# Patient Record
Sex: FEMALE | Race: AMERICAN INDIAN OR ALASKA NATIVE | HISPANIC OR LATINO
[De-identification: names, ages, dates, MRNs, and addresses within clinical notes are randomized per-mention and may not be internally consistent; named-entity substitution may affect disease eponyms.]

---

## 2017-01-26 ENCOUNTER — TRANSCRIPTION ENCOUNTER (OUTPATIENT)
Age: 37
End: 2017-01-26

## 2021-02-10 ENCOUNTER — EMERGENCY (EMERGENCY)
Facility: HOSPITAL | Age: 41
LOS: 1 days | Discharge: LEFT BEFORE DISCHARGE | End: 2021-02-10
Attending: EMERGENCY MEDICINE
Payer: MEDICAID

## 2021-02-10 VITALS
TEMPERATURE: 98 F | HEIGHT: 61 IN | DIASTOLIC BLOOD PRESSURE: 65 MMHG | RESPIRATION RATE: 20 BRPM | WEIGHT: 145.06 LBS | HEART RATE: 76 BPM | SYSTOLIC BLOOD PRESSURE: 111 MMHG

## 2021-02-10 PROCEDURE — 99285 EMERGENCY DEPT VISIT HI MDM: CPT

## 2021-02-10 PROCEDURE — 93010 ELECTROCARDIOGRAM REPORT: CPT

## 2021-02-10 RX ORDER — FAMOTIDINE 10 MG/ML
20 INJECTION INTRAVENOUS ONCE
Refills: 0 | Status: COMPLETED | OUTPATIENT
Start: 2021-02-10 | End: 2021-02-10

## 2021-02-10 RX ADMIN — FAMOTIDINE 20 MILLIGRAM(S): 10 INJECTION INTRAVENOUS at 23:14

## 2021-02-10 RX ADMIN — Medication 30 MILLILITER(S): at 23:13

## 2021-02-10 NOTE — ED PROVIDER NOTE - ATTENDING CONTRIBUTION TO CARE
Private Physician None  41y female PMH fx hip/pelvis [employer]/service connected. Ex-smoker dc 2m ago. No dm, Hypertension,hld, cancer,cva,cad,covid,meds. Pt comes to ed c/o sudden onset of sharp cp. Approx 2h ago, lasted 2min. took asa, No rad,sob,nvdc,palp,diaphoresis.abd pain. FH mother 70 HTN, CAD from age 30's Father 70 CKD/HD/Renal transplant/cirrhosis,dm, CAD from age 40. PE WDWN female NCAT neck supple chest clear anterior & posterior abd soft +bs no mass guarding cv no rubs, gallops or murmurs neuro no focal defects.  Truman Hallman MD, Facep

## 2021-02-10 NOTE — ED PROVIDER NOTE - PHYSICAL EXAMINATION
GENERAL: no acute distress, non-toxic appearing  HEENT: normal conjunctiva, oral mucosa moist  CARDIAC: regular rate and regular rhythm, normal S1 and S2, no appreciable murmurs  PULM: clear to ascultation bilaterally, no appreciable crackles, rales, rhonchi, or wheezing, sats 100% on RA, no increased work of breathing  GI: abdomen nondistended, soft, nontender  : no CVA tenderness, no suprapubic tenderness  NEURO: alert and oriented x 3, normal speech, moving all extremities without lateralization  MSK: no visible deformities, no peripheral edema, calf tenderness/redness/swelling  SKIN: no visible rashes  PSYCH: appropriate mood and affect Opt out

## 2021-02-10 NOTE — ED PROVIDER NOTE - PATIENT PORTAL LINK FT
You can access the FollowMyHealth Patient Portal offered by [facility] by registering at the following website: [email or web address]. By joining [employer]’s FollowMyHealth portal, you will also be able to view your health information using other applications (apps) compatible with our system.

## 2021-02-10 NOTE — ED PROVIDER NOTE - CLINICAL SUMMARY MEDICAL DECISION MAKING FREE TEXT BOX
Likely GERD, low suspicion for ACS, low suspicion for PE pt percs out. Will do labs, cxr, ekg, trop, pepcid/maalox. Reassess.

## 2021-02-10 NOTE — ED PROVIDER NOTE - NSFOLLOWUPCLINICS_GEN_ALL_ED_FT
[facility]  Cardiology  [address] [place] [address]  Phone: [phone]  Fax:   Follow Up Time: 1-3 Days

## 2021-02-10 NOTE — ED PROVIDER NOTE - NSFOLLOWUPINSTRUCTIONS_ED_ALL_ED_FT
- Please follow up with your Primary Care Doctor within 48 hours. Bring your results from today.    - Please follow up with a Cardiologist within 48 hours. A referral was made to Cardiology, you should get a call within 24 hours with appointment information.     - Be sure to return to the ED if you develop new or worsening symptoms. Specific signs and symptoms to be vigilant of: chest pain, lightheadedness, palpitations, shortness of breath, or any other distressing symptoms.

## 2021-02-10 NOTE — ED PROVIDER NOTE - OBJECTIVE STATEMENT
41F no PMH, presents after she felt sharp substernal chest pain for 3 minute duration 2 hours prior to arrival in the ED. Took 1 baby aspirin and then 2 tums and says the pain went away just has some discomfort and thinks it's likely gas but was concerned so she came in. Denies any radiation of the pain, denies any association with diaphoresis, nausea, sob, palpitations, lightheadedness. Denies any fevers, chills, uri sxs, cough, abd pain, v/d, urinary sxs, leg swelling. No hx blood clots, no recent surgeries/trauma, not on OCP's. Former smoker of 10 years, quit December 2020.

## 2021-02-10 NOTE — ED PROVIDER NOTE - PROGRESS NOTE DETAILS
Frantz, PGY2: pt with family hx of early CAD mom in 30s, we recommended that if everything is negative she stay in the CDU, states she's unable to due to her 1 year old daughter at home she needs to be around for. Will set pt up for Cards but she'd leave AMA. She understands.

## 2021-02-10 NOTE — ED PROVIDER NOTE - REFUSAL OF SERVICE, MDM
Patient understands that given her family history of early CAD we are concerned about her chest pain and would like her to stay for further testing. She understands that she is at risk of having a major cardiac event. She understands that she has to follow up with Cardiologist.

## 2021-02-11 VITALS
RESPIRATION RATE: 16 BRPM | DIASTOLIC BLOOD PRESSURE: 90 MMHG | TEMPERATURE: 98 F | HEART RATE: 56 BPM | SYSTOLIC BLOOD PRESSURE: 95 MMHG | OXYGEN SATURATION: 100 %

## 2021-02-11 LAB
ALBUMIN SERPL ELPH-MCNC: 4 G/DL — SIGNIFICANT CHANGE UP (ref 3.3–5)
ALP SERPL-CCNC: 64 U/L — SIGNIFICANT CHANGE UP (ref 40–120)
ALT FLD-CCNC: 12 U/L — SIGNIFICANT CHANGE UP (ref 10–45)
ANION GAP SERPL CALC-SCNC: 12 MMOL/L — SIGNIFICANT CHANGE UP (ref 5–17)
AST SERPL-CCNC: 18 U/L — SIGNIFICANT CHANGE UP (ref 10–40)
BASOPHILS # BLD AUTO: 0.04 K/UL — SIGNIFICANT CHANGE UP (ref 0–0.2)
BASOPHILS NFR BLD AUTO: 0.5 % — SIGNIFICANT CHANGE UP (ref 0–2)
BILIRUB SERPL-MCNC: 0.1 MG/DL — LOW (ref 0.2–1.2)
BUN SERPL-MCNC: 16 MG/DL — SIGNIFICANT CHANGE UP (ref 7–23)
CALCIUM SERPL-MCNC: 10 MG/DL — SIGNIFICANT CHANGE UP (ref 8.4–10.5)
CHLORIDE SERPL-SCNC: 104 MMOL/L — SIGNIFICANT CHANGE UP (ref 96–108)
CO2 SERPL-SCNC: 21 MMOL/L — LOW (ref 22–31)
CREAT SERPL-MCNC: 0.72 MG/DL — SIGNIFICANT CHANGE UP (ref 0.5–1.3)
EOSINOPHIL # BLD AUTO: 0.13 K/UL — SIGNIFICANT CHANGE UP (ref 0–0.5)
EOSINOPHIL NFR BLD AUTO: 1.5 % — SIGNIFICANT CHANGE UP (ref 0–6)
GLUCOSE SERPL-MCNC: 92 MG/DL — SIGNIFICANT CHANGE UP (ref 70–99)
HCG SERPL-ACNC: <2 MIU/ML — SIGNIFICANT CHANGE UP
HCT VFR BLD CALC: 37.1 % — SIGNIFICANT CHANGE UP (ref 34.5–45)
HGB BLD-MCNC: 11.9 G/DL — SIGNIFICANT CHANGE UP (ref 11.5–15.5)
IMM GRANULOCYTES NFR BLD AUTO: 0.3 % — SIGNIFICANT CHANGE UP (ref 0–1.5)
LYMPHOCYTES # BLD AUTO: 3.29 K/UL — SIGNIFICANT CHANGE UP (ref 1–3.3)
LYMPHOCYTES # BLD AUTO: 38 % — SIGNIFICANT CHANGE UP (ref 13–44)
MCHC RBC-ENTMCNC: 30.2 PG — SIGNIFICANT CHANGE UP (ref 27–34)
MCHC RBC-ENTMCNC: 32.1 GM/DL — SIGNIFICANT CHANGE UP (ref 32–36)
MCV RBC AUTO: 94.2 FL — SIGNIFICANT CHANGE UP (ref 80–100)
MONOCYTES # BLD AUTO: 0.66 K/UL — SIGNIFICANT CHANGE UP (ref 0–0.9)
MONOCYTES NFR BLD AUTO: 7.6 % — SIGNIFICANT CHANGE UP (ref 2–14)
NEUTROPHILS # BLD AUTO: 4.5 K/UL — SIGNIFICANT CHANGE UP (ref 1.8–7.4)
NEUTROPHILS NFR BLD AUTO: 52.1 % — SIGNIFICANT CHANGE UP (ref 43–77)
NRBC # BLD: 0 /100 WBCS — SIGNIFICANT CHANGE UP (ref 0–0)
PLATELET # BLD AUTO: 277 K/UL — SIGNIFICANT CHANGE UP (ref 150–400)
POTASSIUM SERPL-MCNC: 3.9 MMOL/L — SIGNIFICANT CHANGE UP (ref 3.5–5.3)
POTASSIUM SERPL-SCNC: 3.9 MMOL/L — SIGNIFICANT CHANGE UP (ref 3.5–5.3)
PROT SERPL-MCNC: 6.7 G/DL — SIGNIFICANT CHANGE UP (ref 6–8.3)
RBC # BLD: 3.94 M/UL — SIGNIFICANT CHANGE UP (ref 3.8–5.2)
RBC # FLD: 11.9 % — SIGNIFICANT CHANGE UP (ref 10.3–14.5)
SODIUM SERPL-SCNC: 137 MMOL/L — SIGNIFICANT CHANGE UP (ref 135–145)
TROPONIN T, HIGH SENSITIVITY RESULT: <6 NG/L — SIGNIFICANT CHANGE UP (ref 0–51)
TROPONIN T, HIGH SENSITIVITY RESULT: <6 NG/L — SIGNIFICANT CHANGE UP (ref 0–51)
WBC # BLD: 8.65 K/UL — SIGNIFICANT CHANGE UP (ref 3.8–10.5)
WBC # FLD AUTO: 8.65 K/UL — SIGNIFICANT CHANGE UP (ref 3.8–10.5)

## 2021-02-11 PROCEDURE — 99284 EMERGENCY DEPT VISIT MOD MDM: CPT | Mod: 25

## 2021-02-11 PROCEDURE — 71045 X-RAY EXAM CHEST 1 VIEW: CPT

## 2021-02-11 PROCEDURE — 71045 X-RAY EXAM CHEST 1 VIEW: CPT | Mod: 26

## 2021-02-11 PROCEDURE — 80053 COMPREHEN METABOLIC PANEL: CPT

## 2021-02-11 PROCEDURE — 84702 CHORIONIC GONADOTROPIN TEST: CPT

## 2021-02-11 PROCEDURE — 93005 ELECTROCARDIOGRAM TRACING: CPT

## 2021-02-11 PROCEDURE — 85025 COMPLETE CBC W/AUTO DIFF WBC: CPT

## 2021-02-11 PROCEDURE — 96374 THER/PROPH/DIAG INJ IV PUSH: CPT

## 2021-02-11 PROCEDURE — 84484 ASSAY OF TROPONIN QUANT: CPT

## 2021-02-11 NOTE — ED ADULT NURSE NOTE - OBJECTIVE STATEMENT
Pt is a 42 y/o female c/o episode of chest pain around 2100 2/10/21. States she had a 5 minute episode of midsternal squeezing chest pain, states she has never felt anything like this before. After episode states she had slight chest discomfort. Denies SOB, N/V/D, numbness, tingling, cough, fever, chills, dizziness, weakness, headache. A&Ox3. Breathing unlabored and spontaneous. Abdomen soft, nontender, nondistended. Full ROM and strength of extremities. Safety and comfort measures provided, call bell provided to pt and bed in lowest position.  Pt AMA r/t needing to care for her 2 y/o child, states she will follow up with PCP and cardiology. Denies any complaints at time of d/c, KARLOS paper signed and in chart.

## 2021-02-11 NOTE — ED ADULT NURSE REASSESSMENT NOTE - NS ED NURSE REASSESS COMMENT FT1
Pt A&Ox4, returned from xray. Pt on tele cardiac monitor. Tele tech over to team with print out of pt have episode of bradycardia. MD made aware. pt denies CP, SOB, dizziness, LOC. repeat VSS.

## 2021-08-08 ENCOUNTER — EMERGENCY (EMERGENCY)
Facility: HOSPITAL | Age: 41
LOS: 1 days | Discharge: HOME | End: 2021-08-08
Attending: EMERGENCY MEDICINE
Payer: MEDICAID

## 2021-08-08 VITALS
HEIGHT: 61 IN | WEIGHT: 147.05 LBS | RESPIRATION RATE: 16 BRPM | OXYGEN SATURATION: 96 % | TEMPERATURE: 98 F | HEART RATE: 86 BPM | SYSTOLIC BLOOD PRESSURE: 127 MMHG | DIASTOLIC BLOOD PRESSURE: 87 MMHG

## 2021-08-08 PROCEDURE — 99282 EMERGENCY DEPT VISIT SF MDM: CPT

## 2021-08-08 PROCEDURE — 99283 EMERGENCY DEPT VISIT LOW MDM: CPT

## 2021-08-08 NOTE — ED PROVIDER NOTE - NSFOLLOWUPINSTRUCTIONS_ED_ALL_ED_FT
- stay hydrated.   - take tylenol 975mg and ibuprofen 600mg every 6 hours as needed for pain-take with meals.  -use warm packs/warm soaks to eye lid 3-4 times a day  - follow up with your pcp in 1-2 days.  - return if symptoms worsen, fever, changes in vision, eye pain, blurred vision and all other concerns. - stay hydrated.   - take tylenol 975mg and ibuprofen 600mg every 6 hours as needed for pain-take with meals.  -use warm packs/warm soaks to eye lid 3-4 times a day  - follow up with your pcp in 1-2 days.  -follow up with ophthalmology this week, call number attached to make an appointment.   - return if symptoms worsen, fever, changes in vision, eye pain, blurred vision, redness of the eyelid/skin, and all other concerns.

## 2021-08-08 NOTE — ED PROVIDER NOTE - CLINICAL SUMMARY MEDICAL DECISION MAKING FREE TEXT BOX
41F no PMH here for evaluation of L upper lid stye, which has resolved w warm compresses and cleansing of area, now with chalazion to L upper lid, painless, no surrounding erythema, no drainage, no vision changes. Advised to continue current mgmt and FU with optho.

## 2021-08-08 NOTE — ED PROVIDER NOTE - OBJECTIVE STATEMENT
42 yo female with no pmh here for concern of recurrent styes to left eye. PT states last week she had a particularly large one which improved today however did feel a residual "lump" on her eyelid and wished to have it evaluated. Denies using hot packs or taking medications for the symptoms, does not wear makeup and washed her face daily. No fevers, chills, changes in vision. eye drainage, wears glasses occasionally, no contact lens use, no blurred vision or eye pain.

## 2021-08-08 NOTE — ED PROVIDER NOTE - PATIENT PORTAL LINK FT
You can access the FollowMyHealth Patient Portal offered by [facility] by registering at the following website: [email or web address]. By joining [employer]’s FollowMyHealth portal, you will also be able to view your health information using other applications (apps) compatible with our system.

## 2021-08-08 NOTE — ED PROVIDER NOTE - CARE PLAN
Principal Discharge DX:	Hordeolum externum of left upper eyelid   Principal Discharge DX:	Hordeolum externum of left upper eyelid  Secondary Diagnosis:	Chalazion left upper eyelid

## 2021-08-08 NOTE — ED PROVIDER NOTE - NSFOLLOWUPCLINICS_GEN_ALL_ED_FT
[facility] - Ophthalmology  Ophthalmology  [address], [address]  [place] [address]  Phone: [phone]  Fax:   Follow Up Time: 1-3 Days    [facility]  Ophthalmology  [address]  [place] [address]  Phone: [phone]  Fax:   Follow Up Time: 1-3 Days

## 2021-08-08 NOTE — ED PROVIDER NOTE - PHYSICAL EXAMINATION
A&Ox3, NAD. NCAT. PERRL, EOMI. No conjunctival injection, no tearing or eye discharge. + small nodule to left upper eyelid, nontender, no discharge or erythema. Neck supple, no LAD. Gait steady.

## 2021-08-08 NOTE — ED PROVIDER NOTE - NS ED ROS FT
Constitutional: No fever or chills  Eyes: see hpi  Skin: No rash  Neuro: No headache. No numbness or tingling. No weakness.

## 2021-08-09 NOTE — ED ADULT NURSE NOTE - OBJECTIVE STATEMENT
41 y female presents from home with "lump," on left eye. Reports to chronic "stye," resolved last week- reporting to residual lump. Denies pain, vision change, HA. A&xo3. skin warm and dry. breathing comfortably in bed- no distress. seen and discharged by MD chang.

## 2023-06-05 PROBLEM — Z00.00 ENCOUNTER FOR PREVENTIVE HEALTH EXAMINATION: Status: ACTIVE | Noted: 2023-06-05

## 2023-06-13 ENCOUNTER — NON-APPOINTMENT (OUTPATIENT)
Age: 43
End: 2023-06-13

## 2023-06-13 ENCOUNTER — APPOINTMENT (OUTPATIENT)
Dept: NEUROSURGERY | Facility: CLINIC | Age: 43
End: 2023-06-13
Payer: MEDICAID

## 2023-06-13 VITALS
SYSTOLIC BLOOD PRESSURE: 102 MMHG | BODY MASS INDEX: 29.27 KG/M2 | WEIGHT: 155 LBS | DIASTOLIC BLOOD PRESSURE: 68 MMHG | HEIGHT: 61 IN | HEART RATE: 80 BPM | RESPIRATION RATE: 18 BRPM | OXYGEN SATURATION: 98 %

## 2023-06-13 DIAGNOSIS — G93.0 CEREBRAL CYSTS: ICD-10-CM

## 2023-06-13 PROCEDURE — 99204 OFFICE O/P NEW MOD 45 MIN: CPT

## 2023-06-13 RX ORDER — LIDOCAINE 5% 700 MG/1
PATCH TOPICAL
Refills: 0 | Status: ACTIVE | COMMUNITY

## 2023-06-13 RX ORDER — ACETAMINOPHEN 325 MG/1
TABLET, FILM COATED ORAL
Refills: 0 | Status: ACTIVE | COMMUNITY

## 2023-06-13 NOTE — HISTORY OF PRESENT ILLNESS
[de-identified] : 44 y/o right- handed female, former smoker, with no significant PMHx who presents today for evaluation of brain lesion on MRI dx during workup for progressive right sided numbness. \par \par Pt endorses she started experiencing right fingertip numbness that would come and go a few times a month. \par She sought workup at [facility] and had MRI brain done 1/20/23 which demonstrated small area of signal abnormality in the left posterior frontal subcortical and seep white matter and 3.7 x 1.8 cm arachnoid cyst.\par Since then, numbness episodes have progressed to also include her whole right arm and down her right leg. Described as waves of numbness. Now occur a few times daily. \par She can feel episodes coming on with left ear aura sensation. Notes some triggers of hot and cold sensations. \par \par She repeated MRI brain 5/30/23 which demonstrated some progression for which she was referred for evaluation. \par \par Denies weakness of arms/ legs, difficulty with speech. \par \par She had CT C/A/P which was negative. \par

## 2023-06-13 NOTE — REVIEW OF SYSTEMS
[As Noted in HPI] : as noted in HPI [Fever] : no fever [Chills] : no chills [Confused or Disoriented] : no confusion [Facial Weakness] : no facial weakness [Arm Weakness] : no arm weakness [Hand Weakness] : no hand weakness [Leg Weakness] : no leg weakness [Dizziness] : no dizziness [Eyesight Problems] : no eyesight problems [Chest Pain] : no chest pain [Palpitations] : no palpitations [Shortness Of Breath] : no shortness of breath

## 2023-06-13 NOTE — ASSESSMENT
[FreeTextEntry1] : 43F with no significant pmhx who presents with 6 months of progressive right sided numbness found to have left frontal motor strip cystic lesion with surrounding enhancement and FLAIR signal and second non-enhancing lesion in the posterior limb of the left internal capsule who presents for evaluation. Numbness comes and goes with some triggers such as cold water and temperature changes. Also gets an aura with feeling in right ear prior. We discussed that the differential diagnosis remains broad and I have recommended an MRI brain with spectroscopy and connectomics to help better understand the lesion. We will also discuss at our upcoming multidisciplinary conference for concensus on best way to proceed. We discussed the potential need for a biopsy. I have also referred her to see Dr. Doty to rule out seizures as a potential etiology. \par \par Dr. D' Amico independently reviewed all available images with patient. \par \par PLAN: \par - Present pt at [facility] brain and spine tumor board \par - Repeat MRI brain w/wo with quicktome and spectroscopy \par - Referral to Dr. Doty neurologist/ epilepsy specialist for workup\par - RTC after MRI to review \par \par Patient verbalizes understanding of today’s discussion and next steps in treatment plan. \par \par  \par A total of 45 minutes was spent reviewing the labs, imaging and physical examination of the patient. We discussed the diagnosis, and the plan. The patient's questions were answered. The patient demonstrated an excellent understanding of the plan.

## 2023-06-13 NOTE — PHYSICAL EXAM
[Oriented To Time, Place, And Person] : oriented to person, place, and time [Impaired Insight] : insight and judgment were intact [Affect] : the affect was normal [Memory Recent] : recent memory was not impaired [Motor Tone] : muscle tone was normal in all four extremities [Motor Strength] : muscle strength was normal in all four extremities [Motor Handedness Right-Handed] : the patient is right hand dominant [Sclera] : the sclera and conjunctiva were normal [PERRL With Normal Accommodation] : pupils were equal in size, round, reactive to light, with normal accommodation [Extraocular Movements] : extraocular movements were intact [Hearing Threshold Finger Rub Not [place]] : hearing was normal [Neck Appearance] : the appearance of the neck was normal [] : no respiratory distress [Respiration, Rhythm And Depth] : normal respiratory rhythm and effort [Abnormal Walk] : normal gait [Skin Color & Pigmentation] : normal skin color and pigmentation [FreeTextEntry5] : CN II- XII grossly intact

## 2023-06-14 ENCOUNTER — LABORATORY RESULT (OUTPATIENT)
Age: 43
End: 2023-06-14

## 2023-06-14 ENCOUNTER — APPOINTMENT (OUTPATIENT)
Dept: NEUROLOGY | Facility: CLINIC | Age: 43
End: 2023-06-14
Payer: MEDICAID

## 2023-06-14 VITALS
HEART RATE: 87 BPM | WEIGHT: 156 LBS | BODY MASS INDEX: 29.45 KG/M2 | HEIGHT: 61 IN | DIASTOLIC BLOOD PRESSURE: 74 MMHG | TEMPERATURE: 98 F | SYSTOLIC BLOOD PRESSURE: 109 MMHG | OXYGEN SATURATION: 97 %

## 2023-06-14 DIAGNOSIS — Z78.9 OTHER SPECIFIED HEALTH STATUS: ICD-10-CM

## 2023-06-14 DIAGNOSIS — Z82.3 FAMILY HISTORY OF STROKE: ICD-10-CM

## 2023-06-14 DIAGNOSIS — Z80.0 FAMILY HISTORY OF MALIGNANT NEOPLASM OF DIGESTIVE ORGANS: ICD-10-CM

## 2023-06-14 LAB
ANION GAP SERPL CALC-SCNC: 10 MMOL/L
BUN SERPL-MCNC: 9 MG/DL
C3 SERPL-MCNC: 136 MG/DL
C4 SERPL-MCNC: 26 MG/DL
CALCIUM SERPL-MCNC: 9.7 MG/DL
CHLORIDE SERPL-SCNC: 103 MMOL/L
CO2 SERPL-SCNC: 25 MMOL/L
CREAT SERPL-MCNC: 0.7 MG/DL
EGFR: 109 ML/MIN/1.73M2
GLUCOSE SERPL-MCNC: 92 MG/DL
POTASSIUM SERPL-SCNC: 4.2 MMOL/L
SODIUM SERPL-SCNC: 138 MMOL/L

## 2023-06-14 PROCEDURE — 99204 OFFICE O/P NEW MOD 45 MIN: CPT

## 2023-06-14 NOTE — ASSESSMENT
[FreeTextEntry1] : 43-year-old woman with episodic right-sided numbness and multifocal FLAIR lesions (left posterior frontal with enhancement, left internal capsule without enhancement).  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  I do not think EEG would be likely to show correlate so will instead start Keppra 250 mg BID to prevent episodes and confirm the diagnosis.  Differential for the etiology of these lesions is broad and includes neoplastic, infectious, inflammatory, and vascular (ie stroke) disorders.  Will check [place], dsDNA, [place], ANCA, complement, MOG, AQP4, antiphospholipid antibodies, HIV, RPR, toxo.  Will likely need LP and/or biopsy for definitive diagnosis.

## 2023-06-14 NOTE — PHYSICAL EXAM
[FreeTextEntry1] : Physical Exam\par Constitutional: no apparent distress\par Psychiatric: normal affect, euthymic, alert and oriented x 3\par \par Neurologic Exam:\par Mental Status: awake and alert, speech fluent and prosodic with no paraphasic errors\par Cranial Nerves: I: deferred; II: pupils equal round and reactive, visual fields full to confrontation, III, IV, VI: extraocular movements full with no nystagmus; V: facial sensation intact and symmetric; VII: facial power symmetric; VIII: hearing intact to finger rub; IX/X: palate elevates symmetrically, no dysarthria; XI: shoulder shrug symmetric; XII: tongue protrudes midline\par Motor: normal bulk and tone, no orbiting or pronator drift, power 5/5 to confrontation throughout including shoulder abduction, elbow flexion and extension, wrist flexion and extension, hip flexion, knee flexion and extension, no abnormal movements\par Sensation: decreased to light touch in right face, arm, and leg\par Coordination: finger-nose-finger intact bilaterally\par Reflexes: 2+ biceps, triceps, brachioradialis, patella\par Gait: narrow base, normal stance and stride, normal arm swing, normal tandem, negative Romberg\par

## 2023-06-14 NOTE — HISTORY OF PRESENT ILLNESS
[FreeTextEntry1] : 43-year-old woman with brain lesions presenting for evaluation of possible focal seizures.\par \par She reports that in August 2022 she was playing with her 3-year-old daughter when she suddenly felt numbness and tingling in her right arm and leg which lasted for 15-20 seconds.  She called EMS, was fine when they arrived and did not end up going to the hospital.  Over the next 2 months she continued to have these episodes about once a week and they started to involve the face and ear and along with the arm and leg.  Sometimes feels heavy and weak, but no stiffness or involuntary movements.  No loss of consciousness.  Right hand feels weaker and more clumsy overall.  \par \par MRI brain at [facility] in January and then in May 2023 showed left posterior frontal cortical cystic lesion with surrounding T2/FLAIR hyperintensity as well as separate circular T2/FLAIR lesion in left internal capsule.  \par \par Cousin has MS.  No other personal or family history of autoimmune disorders.  Up to date with mammogram and pap smear.

## 2023-06-15 LAB
CARDIOLIPIN AB SER IA-ACNC: NEGATIVE
ENA SS-A AB SER IA-ACNC: <0.2 AL
ENA SS-B AB SER IA-ACNC: <0.2 AL

## 2023-06-16 LAB
B2 GLYCOPROT1 IGA SERPL IA-ACNC: <5 SAU
B2 GLYCOPROT1 IGG SER-ACNC: <5 SGU
B2 GLYCOPROT1 IGM SER-ACNC: <5 SMU
CARDIOLIPIN IGM SER-MCNC: 7 GPL
CARDIOLIPIN IGM SER-MCNC: <5 MPL
DSDNA AB SER-ACNC: <12 IU/ML

## 2023-06-26 ENCOUNTER — NON-APPOINTMENT (OUTPATIENT)
Age: 43
End: 2023-06-26

## 2023-06-28 ENCOUNTER — TRANSCRIPTION ENCOUNTER (OUTPATIENT)
Age: 43
End: 2023-06-28

## 2023-06-29 ENCOUNTER — OUTPATIENT (OUTPATIENT)
Dept: OUTPATIENT SERVICES | Facility: HOSPITAL | Age: 43
LOS: 1 days | End: 2023-06-29
Payer: COMMERCIAL

## 2023-06-29 ENCOUNTER — APPOINTMENT (OUTPATIENT)
Dept: MRI IMAGING | Facility: HOSPITAL | Age: 43
End: 2023-06-29

## 2023-06-29 DIAGNOSIS — G93.9 DISORDER OF BRAIN, UNSPECIFIED: ICD-10-CM

## 2023-06-29 PROCEDURE — A9585: CPT

## 2023-06-29 PROCEDURE — 70553 MRI BRAIN STEM W/O & W/DYE: CPT

## 2023-06-29 PROCEDURE — 70553 MRI BRAIN STEM W/O & W/DYE: CPT | Mod: 26

## 2023-07-03 NOTE — REASON FOR VISIT
[Home] : at home, [unfilled] , at the time of the visit. [Medical Office: ([facility])___] : at the medical office located in  [Patient] : the patient [Follow-Up: _____] : a [unfilled] follow-up visit

## 2023-07-05 ENCOUNTER — APPOINTMENT (OUTPATIENT)
Dept: NEUROSURGERY | Facility: CLINIC | Age: 43
End: 2023-07-05
Payer: MEDICAID

## 2023-07-05 PROCEDURE — 99212 OFFICE O/P EST SF 10 MIN: CPT | Mod: 95

## 2023-07-05 NOTE — ASSESSMENT
[FreeTextEntry1] : 43F with no significant pmhx who presents with 6 months of progressive right sided numbness found to have left frontal motor strip cystic lesion with surrounding enhancement and FLAIR signal and second non-enhancing lesion in the posterior limb of the left internal capsule who presents for evaluation. Numbness comes and goes with some triggers such as cold water and temperature changes. Also gets an aura with feeling in right ear prior. We discussed that the differential diagnosis remains broad. MRI brain without significant growth. Spect pending. Awaiting LP per neurology. We discussed the potential need for a biopsy. I have also referred her to see Dr. Doty to rule out seizures as a potential etiology. \par \par Dr. D' Amico independently reviewed all available images with patient. \par \par PLAN: \par - LP with Dr. Doty as planned\par - Repeat MRI brain w/wo 3 months\par - RTC after MRI to review, notify if new/worsening symptoms for sooner f/u\par \par Patient verbalizes understanding of today’s discussion and next steps in treatment plan. \par \par  \par A total of 15 minutes was spent reviewing the labs, imaging and physical examination of the patient. We discussed the diagnosis, and the plan. The patient's questions were answered. The patient demonstrated an excellent understanding of the plan.

## 2023-07-05 NOTE — PHYSICAL EXAM
[General Appearance - Alert] : alert [General Appearance - In No Acute Distress] : in no acute distress [General Appearance - Well-Appearing] : healthy appearing [Oriented To Time, Place, And Person] : oriented to person, place, and time [Impaired Insight] : insight and judgment were intact [Affect] : the affect was normal [Memory Recent] : recent memory was not impaired [Sclera] : the sclera and conjunctiva were normal [Neck Appearance] : the appearance of the neck was normal [] : no respiratory distress [Respiration, Rhythm And Depth] : normal respiratory rhythm and effort [Skin Color & Pigmentation] : normal skin color and pigmentation

## 2023-07-05 NOTE — HISTORY OF PRESENT ILLNESS
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 \par  \par \par - Pt started experiencing right fingertip numbness that would come and go a few times a month. \par She sought workup at [facility] and had MRI brain done 1/20/23 which demonstrated small area of signal abnormality in the left posterior frontal subcortical and seep white matter and 3.7 x 1.8 cm arachnoid cyst.\par Since then, numbness episodes progressed to also include her whole right arm and down her right leg. Described as waves of numbness and now occur a few times daily, sometimes triggered by hot and cold sensations \par She can feel episodes coming on with left ear aura sensation. \par \par - She repeated MRI brain 5/30/23 which demonstrated some progression for which she was referred for evaluation. She had CT C/A/P which was negative. \par \par - 6/13/23 pt presented for eval. \par Plan made to repeat MRI with spectroscopy and quicktome; referred to Dr. Perea to r/o seizures. \par \par Interim: pt was evaluated by Dr. Perea- started on Keppra 250 mg BID, undergoing infection/ inflammatory workup.\par Planning to have LP. \par \par She completed MRI 6/29.  \par \par TODAY pt returns for follow- up and imaging review. \par She is still having right sided symptoms. \par \par  Neurologist: Dr. Sobia Perea\par

## 2023-07-05 NOTE — REVIEW OF SYSTEMS
[As Noted in HPI] : as noted in HPI [Numbness] : numbness [Fever] : no fever [Chills] : no chills [Chest Pain] : no chest pain [Palpitations] : no palpitations [Shortness Of Breath] : no shortness of breath

## 2023-07-12 LAB
ANION GAP SERPL CALC-SCNC: 13 MMOL/L
BASOPHILS # BLD AUTO: 0.05 K/UL
BASOPHILS NFR BLD AUTO: 0.7 %
BUN SERPL-MCNC: 9 MG/DL
CALCIUM SERPL-MCNC: 9.4 MG/DL
CHLORIDE SERPL-SCNC: 104 MMOL/L
CO2 SERPL-SCNC: 23 MMOL/L
CREAT SERPL-MCNC: 0.71 MG/DL
EGFR: 107 ML/MIN/1.73M2
EOSINOPHIL # BLD AUTO: 0.06 K/UL
EOSINOPHIL NFR BLD AUTO: 0.9 %
GLUCOSE SERPL-MCNC: 88 MG/DL
HCT VFR BLD CALC: 38 %
HGB BLD-MCNC: 12.1 G/DL
IMM GRANULOCYTES NFR BLD AUTO: 0.4 %
INR PPP: 1 RATIO
LYMPHOCYTES # BLD AUTO: 2.44 K/UL
LYMPHOCYTES NFR BLD AUTO: 34.9 %
MAN DIFF?: NORMAL
MCHC RBC-ENTMCNC: 30.2 PG
MCHC RBC-ENTMCNC: 31.8 GM/DL
MCV RBC AUTO: 94.8 FL
MONOCYTES # BLD AUTO: 0.49 K/UL
MONOCYTES NFR BLD AUTO: 7 %
NEUTROPHILS # BLD AUTO: 3.92 K/UL
NEUTROPHILS NFR BLD AUTO: 56.1 %
PLATELET # BLD AUTO: 248 K/UL
POTASSIUM SERPL-SCNC: 4.3 MMOL/L
PT BLD: 11.6 SEC
RBC # BLD: 4.01 M/UL
RBC # FLD: 12 %
SODIUM SERPL-SCNC: 140 MMOL/L
WBC # FLD AUTO: 6.99 K/UL

## 2023-07-26 ENCOUNTER — RESULT REVIEW (OUTPATIENT)
Age: 43
End: 2023-07-26

## 2023-07-26 ENCOUNTER — APPOINTMENT (OUTPATIENT)
Dept: INTERVENTIONAL RADIOLOGY/VASCULAR | Facility: HOSPITAL | Age: 43
End: 2023-07-26
Payer: MEDICAID

## 2023-07-26 ENCOUNTER — OUTPATIENT (OUTPATIENT)
Dept: OUTPATIENT SERVICES | Facility: HOSPITAL | Age: 43
LOS: 1 days | End: 2023-07-26
Payer: COMMERCIAL

## 2023-07-26 DIAGNOSIS — G93.9 DISORDER OF BRAIN, UNSPECIFIED: ICD-10-CM

## 2023-07-26 LAB
APPEARANCE CSF: CLEAR — SIGNIFICANT CHANGE UP
APPEARANCE SPUN FLD: COLORLESS — SIGNIFICANT CHANGE UP
COLOR CSF: SIGNIFICANT CHANGE UP
CSF COMMENTS: SIGNIFICANT CHANGE UP
GLUCOSE CSF-MCNC: 66 MG/DL — SIGNIFICANT CHANGE UP (ref 40–70)
NEUTROPHILS # CSF: 0 % — SIGNIFICANT CHANGE UP (ref 0–6)
NRBC NFR CSF: 0 /UL — SIGNIFICANT CHANGE UP (ref 0–5)
PROT CSF-MCNC: 18 MG/DL — SIGNIFICANT CHANGE UP (ref 15–45)
RBC # CSF: 0 /UL — SIGNIFICANT CHANGE UP (ref 0–0)
TUBE TYPE: SIGNIFICANT CHANGE UP

## 2023-07-26 PROCEDURE — 88305 TISSUE EXAM BY PATHOLOGIST: CPT | Mod: 26

## 2023-07-26 PROCEDURE — 62328 DX LMBR SPI PNXR W/FLUOR/CT: CPT

## 2023-07-26 PROCEDURE — 82784 ASSAY IGA/IGD/IGG/IGM EACH: CPT

## 2023-07-26 PROCEDURE — 88108 CYTOPATH CONCENTRATE TECH: CPT | Mod: 26

## 2023-07-26 PROCEDURE — 82164 ANGIOTENSIN I ENZYME TEST: CPT

## 2023-07-26 PROCEDURE — 84157 ASSAY OF PROTEIN OTHER: CPT

## 2023-07-26 PROCEDURE — 82042 OTHER SOURCE ALBUMIN QUAN EA: CPT

## 2023-07-26 PROCEDURE — 82945 GLUCOSE OTHER FLUID: CPT

## 2023-07-26 PROCEDURE — 82040 ASSAY OF SERUM ALBUMIN: CPT

## 2023-07-26 PROCEDURE — 89051 BODY FLUID CELL COUNT: CPT

## 2023-07-26 PROCEDURE — 83916 OLIGOCLONAL BANDS: CPT

## 2023-07-26 PROCEDURE — 88305 TISSUE EXAM BY PATHOLOGIST: CPT

## 2023-07-26 PROCEDURE — 88108 CYTOPATH CONCENTRATE TECH: CPT

## 2023-07-28 LAB
ALBUMIN CSF-MCNC: 11.3 MG/DL — LOW (ref 14–25)
ALBUMIN SERPL ELPH-MCNC: 3628 MG/DL — SIGNIFICANT CHANGE UP (ref 3500–5200)
IGG CSF-MCNC: 1.9 MG/DL — SIGNIFICANT CHANGE UP
IGG FLD-MCNC: 1023 MG/DL — SIGNIFICANT CHANGE UP (ref 610–1660)
IGG SYNTH RATE SER+CSF CALC-MRATE: -1.6 MG/DAY — SIGNIFICANT CHANGE UP
IGG/ALB CLEAR SER+CSF-RTO: 0.6 — SIGNIFICANT CHANGE UP
IGG/ALB CSF: 0.17 RATIO — SIGNIFICANT CHANGE UP
IGG/ALB SER: 0.28 RATIO — SIGNIFICANT CHANGE UP

## 2023-07-31 LAB
INNER EAR 68KD AB FLD QL: <1.5 U/L — SIGNIFICANT CHANGE UP (ref 0–2.5)
NON-GYNECOLOGICAL CYTOLOGY STUDY: SIGNIFICANT CHANGE UP

## 2023-08-02 ENCOUNTER — NON-APPOINTMENT (OUTPATIENT)
Age: 43
End: 2023-08-02

## 2023-08-08 LAB — OLIGOCLONAL BANDS CSF ELPH-IMP: SIGNIFICANT CHANGE UP

## 2023-08-10 ENCOUNTER — NON-APPOINTMENT (OUTPATIENT)
Age: 43
End: 2023-08-10

## 2023-09-06 ENCOUNTER — APPOINTMENT (OUTPATIENT)
Dept: MRI IMAGING | Facility: HOSPITAL | Age: 43
End: 2023-09-06
Payer: MEDICAID

## 2023-09-06 ENCOUNTER — OUTPATIENT (OUTPATIENT)
Dept: OUTPATIENT SERVICES | Facility: HOSPITAL | Age: 43
LOS: 1 days | End: 2023-09-06
Payer: COMMERCIAL

## 2023-09-06 PROCEDURE — 70553 MRI BRAIN STEM W/O & W/DYE: CPT

## 2023-09-06 PROCEDURE — 70553 MRI BRAIN STEM W/O & W/DYE: CPT | Mod: 26

## 2023-09-06 PROCEDURE — A9585: CPT

## 2023-09-07 NOTE — HISTORY OF PRESENT ILLNESS
[FreeTextEntry1] : 42 y/o right- handed female, former smoker, with no significant PMHx, found to have left frontal motor strip cystic lesion on MRI done during workup for progressive right sided numbness x 6 months.  - Pt started experiencing right fingertip numbness that would come and go a few times a month. - She sought workup at [facility] and had MRI brain done 1/20/23 which demonstrated small area of signal abnormality in the left posterior frontal subcortical and seep white matter and 3.7 x 1.8 cm arachnoid cyst. - Since then, numbness episodes progressed to also include her whole right arm and down her right leg. Described as waves of numbness and now occur a few times daily, sometimes triggered by hot and cold sensations - She can feel episodes coming on with left ear aura sensation.  - She repeated MRI brain 5/30/23 which demonstrated some progression for which she was referred for evaluation.  She had CT C/A/P which was negative.  - 6/13/23 pt presented for eval. Plan made to repeat MRI with spectroscopy and quicktome; referred to Dr. Perea to r/o seizures who started her on keppra 250 mg BID.   - She completed MRI 6/29/23 which was stable.   - LP completed per Dr. Perea, MS workup negative. Recommended to repeat MRI @ 6 weeks, consider biopsy.   TODAY pt returns for f/u and recent MRI done 9/6/23 review.     Neurologist: Dr. Sobia Perea

## 2023-09-07 NOTE — REASON FOR VISIT
[Home] : at home, [unfilled] , at the time of the visit. [Medical Office: ([facility])___] : at the medical office located in  [Patient] : the patient [Follow-Up: _____] : a [unfilled] follow-up visit

## 2023-09-13 ENCOUNTER — APPOINTMENT (OUTPATIENT)
Dept: NEUROSURGERY | Facility: CLINIC | Age: 43
End: 2023-09-13
Payer: MEDICAID

## 2023-09-13 DIAGNOSIS — Z87.891 PERSONAL HISTORY OF NICOTINE DEPENDENCE: ICD-10-CM

## 2023-09-13 PROCEDURE — 99212 OFFICE O/P EST SF 10 MIN: CPT | Mod: 95

## 2023-12-20 ENCOUNTER — APPOINTMENT (OUTPATIENT)
Dept: MRI IMAGING | Facility: CLINIC | Age: 43
End: 2023-12-20
Payer: MEDICAID

## 2023-12-20 PROCEDURE — 70553 MRI BRAIN STEM W/O & W/DYE: CPT | Mod: 26

## 2023-12-27 ENCOUNTER — APPOINTMENT (OUTPATIENT)
Dept: NEUROSURGERY | Facility: CLINIC | Age: 43
End: 2023-12-27
Payer: MEDICAID

## 2023-12-27 PROCEDURE — 99215 OFFICE O/P EST HI 40 MIN: CPT | Mod: 95

## 2023-12-27 NOTE — REASON FOR VISIT
[Home] : at home, [unfilled] , at the time of the visit. [Medical Office: ([facility])___] : at the medical office located in  [Patient] : the patient [Follow-Up: _____] : a [unfilled] follow-up visit [FreeTextEntry1] : eft frontal motor strip cystic lesion, MRI review.

## 2023-12-27 NOTE — DATA REVIEWED
[de-identified] : EXAM: [id number] - MR BRAIN WAW IC  - ORDERED BY: DARREN THEODORE   PROCEDURE DATE:  12/20/2023    INTERPRETATION:  PROCEDURE: MRI Brain with and without contrast  INDICATION: Left frontal lesion follow-up  TECHNIQUE: Multiplanar multi sequential MR images the brain were obtained before and following the administration of 7 mL IV Gadavist  COMPARISON: MRI brain 9/6/2023 6/29/2023  FINDINGS:  Since prior exam, again demonstrated is a heterogeneous lesion in the left frontal operculum. Previously noted cystic component along the inferior aspect of the lesion currently demonstrates intrinsic T1 hyperintense signal with susceptibility related signal loss. There is central T2 hypointense signal and peripheral hyperintense signal (series 17 image 18). There is questionable peripheral surrounding enhancement although difficult to assess due to intrinsic T1 hyperintense signal. Overall the lesion is stable in size measuring 2.2 cm AP by 1.6 cm transverse by 1.6 cm craniocaudal (series 6 image 19 and series 12 image 19).  There is a stable right middle cranial fossa arachnoid cyst.  There is no hydrocephalus. There is no acute intracranial hemorrhage or midline shift. There is no evidence of recent infarction diffusion-weighted imaging.  The paranasal sinuses and mastoid air cells are well-aerated. The vascular flow voids are preserved.  IMPRESSION:  Since 9/6/2023, again demonstrated is left frontal opercular heterogeneous lesion which is stable in size. However, the previously noted cystic component along the inferior aspect of the lesion, currently demonstrates T1 hyperintense signal and susceptibility related signal loss which may represent subacute blood products. There is questionable peripheral surrounding enhancement although difficult to assess due to intrinsic T1 hyperintense signal. Consider short-term follow-up MRI brain.  --- End of Report ---

## 2023-12-27 NOTE — HISTORY OF PRESENT ILLNESS
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  - Pt started experiencing right fingertip numbness that would come and go a few times a month. - She sought workup at [facility] and had MRI brain done 1/20/23 which demonstrated small area of signal abnormality in the left posterior frontal subcortical and seep white matter and 3.7 x 1.8 cm arachnoid cyst. - Since then, numbness episodes progressed to also include her whole right arm and down her right leg. Described as waves of numbness and now occur a few times daily, sometimes triggered by hot and cold sensations - She can feel episodes coming on with left ear aura sensation.  - She repeated MRI brain 5/30/23 which demonstrated some progression for which she was referred for evaluation. She had CT C/A/P which was negative.  - 6/13/23 pt presented for initial eval. Plan made to repeat MRI with spectroscopy and quicktome; referred to Dr. Perea to r/o seizures who started her on keppra 250 mg BID.  - She completed MRI 6/29/23 which was stable.  - LP completed per Dr. Perea, MS workup negative. Recommended to repeat MRI @ 6 weeks, consider biopsy.  - 9/6/23 MRI stable compared to June study @ 3 month interval.  Continues on anti-seizure medications per Dr. Perea which have dulled her symptoms, also less frequent. No new symptoms.  Discussed potential need for biopsy; however no growth over 3 months (hx of growth in 6 months prior). Plan made to continue to monitor and repeat MRI in 3 months.   Returns TODAY for 3 month follow- up and MRI review.  Since last visit, feels seizure like activities progressing, described as now also feeling "throat closing" during events that she will consciously think about breathing to catch breath.  Symptoms gradually progressing.     Neurologist: Dr. Sobia Perea

## 2023-12-27 NOTE — ASSESSMENT
[FreeTextEntry1] : The medical imaging scans, specifically the MRI, has shown significant changes over the past year. The tumor in the left side of the brain has gotten larger despite not growing rapidly. Patient also indicates worsening symptoms.  The plan is to confirm the nature of tumor through biopsy. If confirmed, special imaging is required to plan surgical removal to affect minimum possible surrounding brain function. Hospitalization for 3 to 5 days is expected post surgery with no requirement of rehab. Patient required to come in ER in case of worsening symptoms.  After detailed imaging review and patient examination, Dr. D'Amico discussed surgical intervention with patient of biopsy.   PLAN: - MRI with quicktome imaging for surgical planning - RTC for in person visit following imaging for review. further surgical discussion  Patient verbalizes understanding of today's discussion and next steps in treatment plan.    A total of 45 minutes was spent reviewing the labs, imaging and physical examination of the patient. We discussed the diagnosis, and the plan. The patient's questions were answered. The patient demonstrated an excellent understanding of the plan.

## 2023-12-27 NOTE — REVIEW OF SYSTEMS
[Seizures] : convulsions [As Noted in HPI] : as noted in HPI [Fever] : no fever [Chills] : no chills [Chest Pain] : no chest pain [Palpitations] : no palpitations

## 2024-01-04 ENCOUNTER — OUTPATIENT (OUTPATIENT)
Dept: OUTPATIENT SERVICES | Facility: HOSPITAL | Age: 44
LOS: 1 days | End: 2024-01-04
Payer: COMMERCIAL

## 2024-01-04 ENCOUNTER — APPOINTMENT (OUTPATIENT)
Dept: INTERNAL MEDICINE | Facility: CLINIC | Age: 44
End: 2024-01-04
Payer: MEDICAID

## 2024-01-04 ENCOUNTER — NON-APPOINTMENT (OUTPATIENT)
Age: 44
End: 2024-01-04

## 2024-01-04 VITALS
DIASTOLIC BLOOD PRESSURE: 66 MMHG | BODY MASS INDEX: 29.27 KG/M2 | OXYGEN SATURATION: 98 % | RESPIRATION RATE: 17 BRPM | WEIGHT: 155 LBS | TEMPERATURE: 98.4 F | HEART RATE: 73 BPM | HEIGHT: 61 IN | SYSTOLIC BLOOD PRESSURE: 103 MMHG

## 2024-01-04 DIAGNOSIS — Z01.818 ENCOUNTER FOR OTHER PREPROCEDURAL EXAMINATION: ICD-10-CM

## 2024-01-04 PROCEDURE — 71046 X-RAY EXAM CHEST 2 VIEWS: CPT

## 2024-01-04 PROCEDURE — G0403: CPT

## 2024-01-04 PROCEDURE — 99214 OFFICE O/P EST MOD 30 MIN: CPT

## 2024-01-04 PROCEDURE — 71046 X-RAY EXAM CHEST 2 VIEWS: CPT | Mod: 26

## 2024-01-04 RX ORDER — CYCLOBENZAPRINE HYDROCHLORIDE 5 MG/1
5 TABLET, FILM COATED ORAL
Refills: 0 | Status: DISCONTINUED | COMMUNITY
End: 2024-01-04

## 2024-01-05 LAB
ALBUMIN SERPL ELPH-MCNC: 4.2 G/DL
ALP BLD-CCNC: 69 U/L
ALT SERPL-CCNC: 12 U/L
ANION GAP SERPL CALC-SCNC: 10 MMOL/L
APTT BLD: 29.7 SEC
AST SERPL-CCNC: 15 U/L
BILIRUB SERPL-MCNC: 0.2 MG/DL
BUN SERPL-MCNC: 9 MG/DL
CALCIUM SERPL-MCNC: 9.4 MG/DL
CHLORIDE SERPL-SCNC: 103 MMOL/L
CHOLEST SERPL-MCNC: 189 MG/DL
CO2 SERPL-SCNC: 24 MMOL/L
CREAT SERPL-MCNC: 0.77 MG/DL
EGFR: 97 ML/MIN/1.73M2
ESTIMATED AVERAGE GLUCOSE: 111 MG/DL
GLUCOSE SERPL-MCNC: 102 MG/DL
HBA1C MFR BLD HPLC: 5.5 %
HCG SERPL-MCNC: <1 MIU/ML
HCT VFR BLD CALC: 38.1 %
HDLC SERPL-MCNC: 40 MG/DL
HGB BLD-MCNC: 12.2 G/DL
INR PPP: 0.9 RATIO
LDLC SERPL CALC-MCNC: 126 MG/DL
MCHC RBC-ENTMCNC: 30.4 PG
MCHC RBC-ENTMCNC: 32 GM/DL
MCV RBC AUTO: 95 FL
NONHDLC SERPL-MCNC: 149 MG/DL
PLATELET # BLD AUTO: 312 K/UL
POTASSIUM SERPL-SCNC: 4.4 MMOL/L
PROT SERPL-MCNC: 7.2 G/DL
PT BLD: 10.2 SEC
RBC # BLD: 4.01 M/UL
RBC # FLD: 11.9 %
SODIUM SERPL-SCNC: 136 MMOL/L
TRIGL SERPL-MCNC: 130 MG/DL
WBC # FLD AUTO: 7.1 K/UL

## 2024-01-05 NOTE — HISTORY OF PRESENT ILLNESS
[No Pertinent Cardiac History] : no history of aortic stenosis, atrial fibrillation, coronary artery disease, recent myocardial infarction, or implantable device/pacemaker [(Patient denies any chest pain, claudication, dyspnea on exertion, orthopnea, palpitations or syncope)] : Patient denies any chest pain, claudication, dyspnea on exertion, orthopnea, palpitations or syncope [Moderate (4-6 METs)] : Moderate (4-6 METs) [FreeTextEntry1] : left frontal craniotomy for tumor resection.  [FreeTextEntry2] : 1/26/2024 [FreeTextEntry4] : 43 yrs old F with pmx of Migraine headaches comes in for pre-op eval for left frontal craniotomy for tumor resection.  Pt has on and off numbness of R side of the body. No other new complains.  Denies any chest pain, cough, fevers, abd pain, vomiting, diarrhea, rash, joint pains, sob. Moderate functional capacity: can walk 2 flights of stairs without any problems.  No problems with anesthesia in the past. Uses aspirin 81 mg daily on and off, no other herbal meds use.

## 2024-01-05 NOTE — ASSESSMENT
[Patient Optimized for Surgery] : Patient optimized for surgery [No Further Testing Recommended] : no further testing recommended [FreeTextEntry4] : 43 yrs old F with pmx of Migraine headaches comes in for pre-op eval for left frontal craniotomy for tumor resection.  RCRI 0 points class I risk. Moderate functional status Pt is low risk for intermediate/high risk procedure. Advised to stop aspirin 81 mg daily 5 days before procedure.  No interventions needed from medicine at this point of time.

## 2024-01-17 ENCOUNTER — APPOINTMENT (OUTPATIENT)
Dept: MRI IMAGING | Facility: HOSPITAL | Age: 44
End: 2024-01-17

## 2024-01-17 ENCOUNTER — OUTPATIENT (OUTPATIENT)
Dept: OUTPATIENT SERVICES | Facility: HOSPITAL | Age: 44
LOS: 1 days | End: 2024-01-17
Payer: COMMERCIAL

## 2024-01-17 PROCEDURE — 70553 MRI BRAIN STEM W/O & W/DYE: CPT | Mod: 26

## 2024-01-17 PROCEDURE — 70553 MRI BRAIN STEM W/O & W/DYE: CPT

## 2024-01-17 PROCEDURE — A9585: CPT

## 2024-01-22 ENCOUNTER — NON-APPOINTMENT (OUTPATIENT)
Age: 44
End: 2024-01-22

## 2024-01-22 ENCOUNTER — APPOINTMENT (OUTPATIENT)
Dept: NEUROLOGY | Facility: CLINIC | Age: 44
End: 2024-01-22
Payer: MEDICAID

## 2024-01-22 PROCEDURE — 96133 NRPSYC TST EVAL PHYS/QHP EA: CPT

## 2024-01-22 PROCEDURE — 96139 PSYCL/NRPSYC TST TECH EA: CPT

## 2024-01-22 PROCEDURE — 96116 NUBHVL XM PHYS/QHP 1ST HR: CPT

## 2024-01-22 PROCEDURE — 96132 NRPSYC TST EVAL PHYS/QHP 1ST: CPT

## 2024-01-22 PROCEDURE — 96138 PSYCL/NRPSYC TECH 1ST: CPT

## 2024-01-24 ENCOUNTER — APPOINTMENT (OUTPATIENT)
Dept: NEUROSURGERY | Facility: CLINIC | Age: 44
End: 2024-01-24
Payer: MEDICAID

## 2024-01-24 VITALS
HEART RATE: 68 BPM | HEIGHT: 61 IN | TEMPERATURE: 97.3 F | RESPIRATION RATE: 18 BRPM | BODY MASS INDEX: 29.27 KG/M2 | SYSTOLIC BLOOD PRESSURE: 107 MMHG | DIASTOLIC BLOOD PRESSURE: 68 MMHG | WEIGHT: 155 LBS | OXYGEN SATURATION: 98 %

## 2024-01-24 PROCEDURE — 99215 OFFICE O/P EST HI 40 MIN: CPT

## 2024-01-24 NOTE — ASSESSMENT
[FreeTextEntry1] : 44F with left frontal lesion demonstrating progressive size increases and continued epileptiform activity scheduled for awake brain biopsy on Friday.   Discussed risks/benefits and connectomic imaging.   After detailed imaging review and patient examination, Dr. D'Amico discussed surgical intervention with patient of biopsy.    Dr. D'Amico independently reviewed all available images with patient.   PLAN: - Surgery as planned 1/26/24  Pre-opt RAPT score 11/12, home dc predication CHG wipes instructions provided to patient by MA  Patient verbalizes understanding of today's discussion and next steps in treatment plan.     A total of 45 minutes was spent reviewing the labs, imaging and physical examination of the patient. We discussed the diagnosis, and the plan. The patient's questions were answered. The patient demonstrated an excellent understanding of the plan.

## 2024-01-24 NOTE — HISTORY OF PRESENT ILLNESS
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  - Pt started experiencing right fingertip numbness that would come and go a few times a month. - She sought workup at [facility] and had MRI brain done 1/20/23 which demonstrated small area of signal abnormality in the left posterior frontal subcortical and seep white matter and 3.7 x 1.8 cm arachnoid cyst. - Since then, numbness episodes progressed to also include her whole right arm and down her right leg. Described as waves of numbness and now occur a few times daily, sometimes triggered by hot and cold sensations - She can feel episodes coming on with left ear aura sensation.  - She repeated MRI brain 5/30/23 which demonstrated some progression for which she was referred for evaluation. She had CT C/A/P which was negative.  - 6/13/23 pt presented for initial eval. Plan made to repeat MRI with spectroscopy and quicktome; referred to Dr. Doty to r/o seizures who started her on keppra 250 mg BID.  - She completed MRI 6/29/23 which was stable.  - LP completed per Dr. Doty, MS workup negative. Recommended to repeat MRI @ 6 weeks, consider biopsy.  - 9/6/23 MRI stable compared to June study @ 3 month interval. Continues on anti-seizure medications per Dr. Doty which have dulled her symptoms, also less frequent. No new symptoms. Discussed potential need for biopsy; however no growth over 3 months (hx of growth in 6 months prior). Plan made to continue to monitor and repeat MRI in 3 months.  12/27/23 pt returned for 3 month f/u and MRI review. Since last visit, feels seizure like activities progressing, described as now also feeling "throat closing" during events that she will consciously think about breathing to catch breath. Symptoms gradually progressing. 12/20/23 MRI reviewed which showed has increased in size despite not growing rapidly. Plan made for her to complete MRI with quicktome; discussed surgical intervention.   Returns TODAY for f/u and review. She is scheduled for surgery this coming 1/26/24.  She continues to have right numbness/seizure like activities, on AEDs, about 5x a day.  Notes right arm slightly weaker than left, right leg also at times.  Completed MRI with quicktome 1/17/24.  Neuropsych cognitive baseline testing with Dr. Ramos 1/22.   Neurologist: Dr. Sobia Doty

## 2024-01-24 NOTE — REASON FOR VISIT
[Follow-Up: _____] : a [unfilled] follow-up visit [FreeTextEntry1] : left frontal motor strip cystic lesion, MRI review.

## 2024-01-24 NOTE — DATA REVIEWED
[de-identified] : ACC: [id number]     EXAM:  MR BRAIN WAW IC   ORDERED BY: DARREN THEODORE  PROCEDURE DATE:  01/17/2024    INTERPRETATION:  PROCEDURE: MRI Brain with and without contrast  INDICATION: Left frontal opercular lesion, follow-up.  TECHNIQUE: Multiplanar multi sequential MR images the brain were obtained before and after the ministration of 7.5 mL IV Gadavist. Multi-scan ep2D dif MDDW images of the brain were obtained utilizing a Quicktome imaging guidance protocol which were used to process color maps of white matter tracts by outside software. Multi-scan ep2D fid basic bold images of the right were obtained using resting state F MRI which was processed by outside software.  COMPARISON: MRI brain 12/20/2023  FINDINGS:  Since prior exam, there is a stable left frontal opercular lesion which measures 2.2 cm transverse by 1.7 cm AP by 1.6 cm craniocaudal (series 24 image 98 and series 16 image 18). Again demonstrated is intrinsic T1 hyperintense signal and hypointense signal on SWI within the lesion consistent with blood products which is stable in appearance from 12/20/2023 although again noted to have change in appearance compared to 9/6/2023 as described on prior exam. Again there is questionable peripheral enhancement which is difficult to evaluate given presence of T1 hyperintensity. There is no new lesion. There is no significant mass effect.  Stable right middle cranial fossa arachnoid cyst.  There is no hydrocephalus. There is no acute intracranial hemorrhage or midline shift. There is no evidence of recent infarction diffusion-weighted imaging. The paranasal sinuses and mastoid air cells are well-aerated. The vascular flow voids are maintained..  IMPRESSION:  Since 12/20/2023, stable left frontal opercular lesion as described above.  --- End of Report ---

## 2024-01-24 NOTE — PHYSICAL EXAM
[General Appearance - Alert] : alert [General Appearance - In No Acute Distress] : in no acute distress [Oriented To Time, Place, And Person] : oriented to person, place, and time [Impaired Insight] : insight and judgment were intact [Affect] : the affect was normal [Memory Recent] : recent memory was not impaired [Motor Handedness Right-Handed] : the patient is right hand dominant [Sclera] : the sclera and conjunctiva were normal [PERRL With Normal Accommodation] : pupils were equal in size, round, reactive to light, with normal accommodation [Extraocular Movements] : extraocular movements were intact [] : no respiratory distress [Neck Appearance] : the appearance of the neck was normal [Respiration, Rhythm And Depth] : normal respiratory rhythm and effort [Skin Color & Pigmentation] : normal skin color and pigmentation [Abnormal Walk] : normal gait [FreeTextEntry5] : Slight right tongue deviation noted on exam; otherwise CN II-XII grossly intact  [FreeTextEntry6] : right upper extremity and hand  strength 4+/5; otherwise 5/5 strength on exam

## 2024-01-25 ENCOUNTER — TRANSCRIPTION ENCOUNTER (OUTPATIENT)
Age: 44
End: 2024-01-25

## 2024-01-25 VITALS
DIASTOLIC BLOOD PRESSURE: 68 MMHG | SYSTOLIC BLOOD PRESSURE: 102 MMHG | TEMPERATURE: 98 F | RESPIRATION RATE: 16 BRPM | WEIGHT: 152.78 LBS | HEIGHT: 61 IN | HEART RATE: 69 BPM | OXYGEN SATURATION: 98 %

## 2024-01-25 RX ORDER — POVIDONE-IODINE 5 %
1 AEROSOL (ML) TOPICAL ONCE
Refills: 0 | Status: COMPLETED | OUTPATIENT
Start: 2024-01-26 | End: 2024-01-26

## 2024-01-25 RX ORDER — LEVETIRACETAM 250 MG/1
1 TABLET, FILM COATED ORAL
Refills: 0 | DISCHARGE

## 2024-01-25 RX ORDER — CYCLOBENZAPRINE HYDROCHLORIDE 10 MG/1
1 TABLET, FILM COATED ORAL
Refills: 0 | DISCHARGE

## 2024-01-25 RX ORDER — ACETAMINOPHEN 500 MG
1 TABLET ORAL
Refills: 0 | DISCHARGE

## 2024-01-26 ENCOUNTER — INPATIENT (INPATIENT)
Facility: HOSPITAL | Age: 44
LOS: 1 days | Discharge: HOME | DRG: 25 | End: 2024-01-28
Attending: NEUROLOGICAL SURGERY | Admitting: NEUROLOGICAL SURGERY
Payer: COMMERCIAL

## 2024-01-26 ENCOUNTER — TRANSCRIPTION ENCOUNTER (OUTPATIENT)
Age: 44
End: 2024-01-26

## 2024-01-26 ENCOUNTER — RESULT REVIEW (OUTPATIENT)
Age: 44
End: 2024-01-26

## 2024-01-26 ENCOUNTER — APPOINTMENT (OUTPATIENT)
Dept: NEUROSURGERY | Facility: HOSPITAL | Age: 44
End: 2024-01-26

## 2024-01-26 DIAGNOSIS — Z98.891 HISTORY OF UTERINE SCAR FROM PREVIOUS SURGERY: Chronic | ICD-10-CM

## 2024-01-26 DIAGNOSIS — Z87.898 PERSONAL HISTORY OF OTHER SPECIFIED CONDITIONS: ICD-10-CM

## 2024-01-26 DIAGNOSIS — G93.9 DISORDER OF BRAIN, UNSPECIFIED: ICD-10-CM

## 2024-01-26 LAB
ANION GAP SERPL CALC-SCNC: 7 MMOL/L — SIGNIFICANT CHANGE UP (ref 5–17)
ANION GAP SERPL CALC-SCNC: 9 MMOL/L — SIGNIFICANT CHANGE UP (ref 5–17)
APTT BLD: 28.7 SEC — SIGNIFICANT CHANGE UP (ref 24.5–35.6)
BLD GP AB SCN SERPL QL: NEGATIVE — SIGNIFICANT CHANGE UP
BUN SERPL-MCNC: 10 MG/DL — SIGNIFICANT CHANGE UP (ref 7–23)
BUN SERPL-MCNC: 9 MG/DL — SIGNIFICANT CHANGE UP (ref 7–23)
CALCIUM SERPL-MCNC: 8.5 MG/DL — SIGNIFICANT CHANGE UP (ref 8.4–10.5)
CALCIUM SERPL-MCNC: 8.5 MG/DL — SIGNIFICANT CHANGE UP (ref 8.4–10.5)
CHLORIDE SERPL-SCNC: 108 MMOL/L — SIGNIFICANT CHANGE UP (ref 96–108)
CHLORIDE SERPL-SCNC: 108 MMOL/L — SIGNIFICANT CHANGE UP (ref 96–108)
CO2 SERPL-SCNC: 20 MMOL/L — LOW (ref 22–31)
CO2 SERPL-SCNC: 22 MMOL/L — SIGNIFICANT CHANGE UP (ref 22–31)
CREAT SERPL-MCNC: 0.7 MG/DL — SIGNIFICANT CHANGE UP (ref 0.5–1.3)
CREAT SERPL-MCNC: 0.71 MG/DL — SIGNIFICANT CHANGE UP (ref 0.5–1.3)
EGFR: 106 ML/MIN/1.73M2 — SIGNIFICANT CHANGE UP
EGFR: 108 ML/MIN/1.73M2 — SIGNIFICANT CHANGE UP
GLUCOSE BLDC GLUCOMTR-MCNC: 173 MG/DL — HIGH (ref 70–99)
GLUCOSE BLDC GLUCOMTR-MCNC: 214 MG/DL — HIGH (ref 70–99)
GLUCOSE SERPL-MCNC: 152 MG/DL — HIGH (ref 70–99)
GLUCOSE SERPL-MCNC: 197 MG/DL — HIGH (ref 70–99)
HCT VFR BLD CALC: 33.6 % — LOW (ref 34.5–45)
HGB BLD-MCNC: 11.4 G/DL — LOW (ref 11.5–15.5)
INR BLD: 0.91 — SIGNIFICANT CHANGE UP (ref 0.85–1.18)
MAGNESIUM SERPL-MCNC: 1.7 MG/DL — SIGNIFICANT CHANGE UP (ref 1.6–2.6)
MAGNESIUM SERPL-MCNC: 2 MG/DL — SIGNIFICANT CHANGE UP (ref 1.6–2.6)
MCHC RBC-ENTMCNC: 31 PG — SIGNIFICANT CHANGE UP (ref 27–34)
MCHC RBC-ENTMCNC: 33.9 GM/DL — SIGNIFICANT CHANGE UP (ref 32–36)
MCV RBC AUTO: 91.3 FL — SIGNIFICANT CHANGE UP (ref 80–100)
NRBC # BLD: 0 /100 WBCS — SIGNIFICANT CHANGE UP (ref 0–0)
PHOSPHATE SERPL-MCNC: 1.7 MG/DL — LOW (ref 2.5–4.5)
PHOSPHATE SERPL-MCNC: 1.8 MG/DL — LOW (ref 2.5–4.5)
PLATELET # BLD AUTO: 246 K/UL — SIGNIFICANT CHANGE UP (ref 150–400)
POTASSIUM SERPL-MCNC: 4.2 MMOL/L — SIGNIFICANT CHANGE UP (ref 3.5–5.3)
POTASSIUM SERPL-MCNC: 4.6 MMOL/L — SIGNIFICANT CHANGE UP (ref 3.5–5.3)
POTASSIUM SERPL-SCNC: 4.2 MMOL/L — SIGNIFICANT CHANGE UP (ref 3.5–5.3)
POTASSIUM SERPL-SCNC: 4.6 MMOL/L — SIGNIFICANT CHANGE UP (ref 3.5–5.3)
PROTHROM AB SERPL-ACNC: 10.4 SEC — SIGNIFICANT CHANGE UP (ref 9.5–13)
RBC # BLD: 3.68 M/UL — LOW (ref 3.8–5.2)
RBC # FLD: 11.7 % — SIGNIFICANT CHANGE UP (ref 10.3–14.5)
RH IG SCN BLD-IMP: POSITIVE — SIGNIFICANT CHANGE UP
SODIUM SERPL-SCNC: 137 MMOL/L — SIGNIFICANT CHANGE UP (ref 135–145)
SODIUM SERPL-SCNC: 137 MMOL/L — SIGNIFICANT CHANGE UP (ref 135–145)
WBC # BLD: 12.04 K/UL — HIGH (ref 3.8–10.5)
WBC # FLD AUTO: 12.04 K/UL — HIGH (ref 3.8–10.5)

## 2024-01-26 PROCEDURE — 88341 IMHCHEM/IMCYTCHM EA ADD ANTB: CPT | Mod: 26

## 2024-01-26 PROCEDURE — 96132 NRPSYC TST EVAL PHYS/QHP 1ST: CPT

## 2024-01-26 PROCEDURE — 69990 MICROSURGERY ADD-ON: CPT | Mod: 59

## 2024-01-26 PROCEDURE — 96137 PSYCL/NRPSYC TST PHY/QHP EA: CPT

## 2024-01-26 PROCEDURE — 95961 ELECTRODE STIMULATION BRAIN: CPT | Mod: 26

## 2024-01-26 PROCEDURE — 99291 CRITICAL CARE FIRST HOUR: CPT

## 2024-01-26 PROCEDURE — 88360 TUMOR IMMUNOHISTOCHEM/MANUAL: CPT | Mod: 26,1L

## 2024-01-26 PROCEDURE — 88307 TISSUE EXAM BY PATHOLOGIST: CPT | Mod: 26

## 2024-01-26 PROCEDURE — 88342 IMHCHEM/IMCYTCHM 1ST ANTB: CPT | Mod: 26

## 2024-01-26 PROCEDURE — 88334 PATH CONSLTJ SURG CYTO XM EA: CPT | Mod: 26,59

## 2024-01-26 PROCEDURE — 88331 PATH CONSLTJ SURG 1 BLK 1SPC: CPT | Mod: 26

## 2024-01-26 PROCEDURE — 96136 PSYCL/NRPSYC TST PHY/QHP 1ST: CPT

## 2024-01-26 PROCEDURE — 61781 SCAN PROC CRANIAL INTRA: CPT

## 2024-01-26 PROCEDURE — 61510 CRNEC TREPH EXC BRN TUM STTL: CPT

## 2024-01-26 PROCEDURE — 96133 NRPSYC TST EVAL PHYS/QHP EA: CPT

## 2024-01-26 DEVICE — SCREW UN3 AXS SELF DRILL 1.5X4MM: Type: IMPLANTABLE DEVICE | Status: FUNCTIONAL

## 2024-01-26 DEVICE — SURGIFOAM PAD 8CM X 12.5CM X 10MM (100): Type: IMPLANTABLE DEVICE | Status: FUNCTIONAL

## 2024-01-26 DEVICE — PLATE LO PROF 8 HOLE: Type: IMPLANTABLE DEVICE | Status: FUNCTIONAL

## 2024-01-26 DEVICE — TACHOSIL 4.8 X 4.8CM: Type: IMPLANTABLE DEVICE | Status: FUNCTIONAL

## 2024-01-26 DEVICE — PLATE COVER BURRHOLE UN3 W/TAB 14MM: Type: IMPLANTABLE DEVICE | Status: FUNCTIONAL

## 2024-01-26 DEVICE — SURGIFLO HEMOSTATIC MATRIX KIT: Type: IMPLANTABLE DEVICE | Status: FUNCTIONAL

## 2024-01-26 DEVICE — SURGCEL 4 X 8": Type: IMPLANTABLE DEVICE | Status: FUNCTIONAL

## 2024-01-26 DEVICE — COLLAGEN DURAMATRIX ONLAY 3X3IN: Type: IMPLANTABLE DEVICE | Status: FUNCTIONAL

## 2024-01-26 RX ORDER — SENNA PLUS 8.6 MG/1
2 TABLET ORAL AT BEDTIME
Refills: 0 | Status: DISCONTINUED | OUTPATIENT
Start: 2024-01-26 | End: 2024-01-28

## 2024-01-26 RX ORDER — DEXAMETHASONE 0.5 MG/5ML
2 ELIXIR ORAL EVERY 8 HOURS
Refills: 0 | Status: CANCELLED | OUTPATIENT
Start: 2024-01-31 | End: 2024-01-28

## 2024-01-26 RX ORDER — DEXAMETHASONE 0.5 MG/5ML
4 ELIXIR ORAL EVERY 8 HOURS
Refills: 0 | Status: DISCONTINUED | OUTPATIENT
Start: 2024-01-29 | End: 2024-01-28

## 2024-01-26 RX ORDER — APREPITANT 80 MG/1
40 CAPSULE ORAL ONCE
Refills: 0 | Status: COMPLETED | OUTPATIENT
Start: 2024-01-26 | End: 2024-01-26

## 2024-01-26 RX ORDER — PANTOPRAZOLE SODIUM 20 MG/1
40 TABLET, DELAYED RELEASE ORAL
Refills: 0 | Status: DISCONTINUED | OUTPATIENT
Start: 2024-01-26 | End: 2024-01-28

## 2024-01-26 RX ORDER — CHLORHEXIDINE GLUCONATE 213 G/1000ML
1 SOLUTION TOPICAL EVERY 12 HOURS
Refills: 0 | Status: DISCONTINUED | OUTPATIENT
Start: 2024-01-26 | End: 2024-01-26

## 2024-01-26 RX ORDER — CELECOXIB 200 MG/1
200 CAPSULE ORAL ONCE
Refills: 0 | Status: COMPLETED | OUTPATIENT
Start: 2024-01-26 | End: 2024-01-26

## 2024-01-26 RX ORDER — TRAMADOL HYDROCHLORIDE 50 MG/1
50 TABLET ORAL EVERY 4 HOURS
Refills: 0 | Status: DISCONTINUED | OUTPATIENT
Start: 2024-01-26 | End: 2024-01-28

## 2024-01-26 RX ORDER — DEXAMETHASONE 0.5 MG/5ML
4 ELIXIR ORAL EVERY 6 HOURS
Refills: 0 | Status: DISCONTINUED | OUTPATIENT
Start: 2024-01-26 | End: 2024-01-28

## 2024-01-26 RX ORDER — TRAMADOL HYDROCHLORIDE 50 MG/1
25 TABLET ORAL EVERY 4 HOURS
Refills: 0 | Status: DISCONTINUED | OUTPATIENT
Start: 2024-01-26 | End: 2024-01-28

## 2024-01-26 RX ORDER — SODIUM CHLORIDE 9 MG/ML
1000 INJECTION INTRAMUSCULAR; INTRAVENOUS; SUBCUTANEOUS
Refills: 0 | Status: DISCONTINUED | OUTPATIENT
Start: 2024-01-26 | End: 2024-01-27

## 2024-01-26 RX ORDER — DEXAMETHASONE 0.5 MG/5ML
4 ELIXIR ORAL EVERY 6 HOURS
Refills: 0 | Status: DISCONTINUED | OUTPATIENT
Start: 2024-01-26 | End: 2024-01-26

## 2024-01-26 RX ORDER — LEVETIRACETAM 250 MG/1
500 TABLET, FILM COATED ORAL
Refills: 0 | Status: DISCONTINUED | OUTPATIENT
Start: 2024-01-26 | End: 2024-01-28

## 2024-01-26 RX ORDER — DEXTROSE 50 % IN WATER 50 %
15 SYRINGE (ML) INTRAVENOUS ONCE
Refills: 0 | Status: DISCONTINUED | OUTPATIENT
Start: 2024-01-26 | End: 2024-01-26

## 2024-01-26 RX ORDER — AMINOLEVULINIC ACID HYDROCHLORIDE 1500 MG/1
1405 POWDER, FOR SOLUTION ORAL ONCE
Refills: 0 | Status: DISCONTINUED | OUTPATIENT
Start: 2024-01-26 | End: 2024-01-26

## 2024-01-26 RX ORDER — DEXTROSE 50 % IN WATER 50 %
25 SYRINGE (ML) INTRAVENOUS ONCE
Refills: 0 | Status: DISCONTINUED | OUTPATIENT
Start: 2024-01-26 | End: 2024-01-26

## 2024-01-26 RX ORDER — CEFAZOLIN SODIUM 1 G
2000 VIAL (EA) INJECTION EVERY 8 HOURS
Refills: 0 | Status: COMPLETED | OUTPATIENT
Start: 2024-01-26 | End: 2024-01-26

## 2024-01-26 RX ORDER — ACETAMINOPHEN 500 MG
1000 TABLET ORAL ONCE
Refills: 0 | Status: COMPLETED | OUTPATIENT
Start: 2024-01-26 | End: 2024-01-26

## 2024-01-26 RX ORDER — POLYETHYLENE GLYCOL 3350 17 G/17G
17 POWDER, FOR SOLUTION ORAL DAILY
Refills: 0 | Status: DISCONTINUED | OUTPATIENT
Start: 2024-01-26 | End: 2024-01-28

## 2024-01-26 RX ORDER — MAGNESIUM SULFATE 500 MG/ML
2 VIAL (ML) INJECTION ONCE
Refills: 0 | Status: COMPLETED | OUTPATIENT
Start: 2024-01-26 | End: 2024-01-26

## 2024-01-26 RX ORDER — PANTOPRAZOLE SODIUM 20 MG/1
40 TABLET, DELAYED RELEASE ORAL DAILY
Refills: 0 | Status: DISCONTINUED | OUTPATIENT
Start: 2024-01-26 | End: 2024-01-26

## 2024-01-26 RX ORDER — SODIUM CHLORIDE 9 MG/ML
1000 INJECTION INTRAMUSCULAR; INTRAVENOUS; SUBCUTANEOUS ONCE
Refills: 0 | Status: COMPLETED | OUTPATIENT
Start: 2024-01-26 | End: 2024-01-26

## 2024-01-26 RX ORDER — GLUCAGON INJECTION, SOLUTION 0.5 MG/.1ML
1 INJECTION, SOLUTION SUBCUTANEOUS ONCE
Refills: 0 | Status: DISCONTINUED | OUTPATIENT
Start: 2024-01-26 | End: 2024-01-26

## 2024-01-26 RX ORDER — SODIUM CHLORIDE 9 MG/ML
1000 INJECTION, SOLUTION INTRAVENOUS
Refills: 0 | Status: DISCONTINUED | OUTPATIENT
Start: 2024-01-26 | End: 2024-01-26

## 2024-01-26 RX ORDER — INSULIN LISPRO 100/ML
VIAL (ML) SUBCUTANEOUS
Refills: 0 | Status: DISCONTINUED | OUTPATIENT
Start: 2024-01-26 | End: 2024-01-28

## 2024-01-26 RX ORDER — DEXAMETHASONE 0.5 MG/5ML
2 ELIXIR ORAL EVERY 12 HOURS
Refills: 0 | Status: CANCELLED | OUTPATIENT
Start: 2024-02-02 | End: 2024-01-28

## 2024-01-26 RX ORDER — CHLORHEXIDINE GLUCONATE 213 G/1000ML
1 SOLUTION TOPICAL
Refills: 0 | Status: DISCONTINUED | OUTPATIENT
Start: 2024-01-26 | End: 2024-01-27

## 2024-01-26 RX ORDER — DEXAMETHASONE 0.5 MG/5ML
ELIXIR ORAL
Refills: 0 | Status: DISCONTINUED | OUTPATIENT
Start: 2024-01-26 | End: 2024-01-28

## 2024-01-26 RX ORDER — LEVETIRACETAM 250 MG/1
500 TABLET, FILM COATED ORAL
Refills: 0 | Status: DISCONTINUED | OUTPATIENT
Start: 2024-01-26 | End: 2024-01-26

## 2024-01-26 RX ORDER — ACETAMINOPHEN 500 MG
1000 TABLET ORAL EVERY 8 HOURS
Refills: 0 | Status: DISCONTINUED | OUTPATIENT
Start: 2024-01-26 | End: 2024-01-28

## 2024-01-26 RX ADMIN — Medication 85 MILLIMOLE(S): at 14:54

## 2024-01-26 RX ADMIN — SODIUM CHLORIDE 70 MILLILITER(S): 9 INJECTION INTRAMUSCULAR; INTRAVENOUS; SUBCUTANEOUS at 12:10

## 2024-01-26 RX ADMIN — Medication 1000 MILLIGRAM(S): at 13:30

## 2024-01-26 RX ADMIN — Medication 4 MILLIGRAM(S): at 18:16

## 2024-01-26 RX ADMIN — TRAMADOL HYDROCHLORIDE 50 MILLIGRAM(S): 50 TABLET ORAL at 23:18

## 2024-01-26 RX ADMIN — SENNA PLUS 2 TABLET(S): 8.6 TABLET ORAL at 21:15

## 2024-01-26 RX ADMIN — Medication 1000 MILLIGRAM(S): at 21:14

## 2024-01-26 RX ADMIN — Medication 25 GRAM(S): at 13:02

## 2024-01-26 RX ADMIN — CHLORHEXIDINE GLUCONATE 1 APPLICATION(S): 213 SOLUTION TOPICAL at 06:25

## 2024-01-26 RX ADMIN — Medication 4 MILLIGRAM(S): at 23:19

## 2024-01-26 RX ADMIN — Medication 1000 MILLIGRAM(S): at 21:47

## 2024-01-26 RX ADMIN — Medication 2: at 21:53

## 2024-01-26 RX ADMIN — SODIUM CHLORIDE 1000 MILLILITER(S): 9 INJECTION INTRAMUSCULAR; INTRAVENOUS; SUBCUTANEOUS at 13:02

## 2024-01-26 RX ADMIN — Medication 1000 MILLIGRAM(S): at 06:52

## 2024-01-26 RX ADMIN — LEVETIRACETAM 500 MILLIGRAM(S): 250 TABLET, FILM COATED ORAL at 18:16

## 2024-01-26 RX ADMIN — PANTOPRAZOLE SODIUM 40 MILLIGRAM(S): 20 TABLET, DELAYED RELEASE ORAL at 13:02

## 2024-01-26 RX ADMIN — Medication 100 MILLIGRAM(S): at 16:44

## 2024-01-26 RX ADMIN — Medication 4: at 16:45

## 2024-01-26 RX ADMIN — CELECOXIB 200 MILLIGRAM(S): 200 CAPSULE ORAL at 06:52

## 2024-01-26 RX ADMIN — Medication 1 APPLICATION(S): at 07:03

## 2024-01-26 RX ADMIN — Medication 1000 MILLIGRAM(S): at 13:00

## 2024-01-26 RX ADMIN — Medication 100 MILLIGRAM(S): at 23:19

## 2024-01-26 RX ADMIN — TRAMADOL HYDROCHLORIDE 25 MILLIGRAM(S): 50 TABLET ORAL at 21:15

## 2024-01-26 RX ADMIN — APREPITANT 40 MILLIGRAM(S): 80 CAPSULE ORAL at 07:02

## 2024-01-26 RX ADMIN — Medication 4 MILLIGRAM(S): at 13:53

## 2024-01-26 RX ADMIN — TRAMADOL HYDROCHLORIDE 25 MILLIGRAM(S): 50 TABLET ORAL at 21:47

## 2024-01-26 NOTE — H&P ADULT - ASSESSMENT
44 year old female, no pertinent PMH, presenting for 6 months of progressive R sided weakness. MRI on 1/20/23 showed small area of signal abnormality in L posterior frontal subcortical and seep white matter, and 3.7x1.8 cm arachnoid cyst. Pt continued to experience worsening R sided weakness and seizure like activity, repeat MRIs showing increase in lesion size. Plan for L frontal awake crani.    44 year old female, no pertinent PMH, presenting for 6 months of progressive R sided weakness. MRI on 1/20/23 showed small area of signal abnormality in L posterior frontal subcortical and deep white matter, and 3.7x1.8 cm arachnoid cyst. Pt continued to experience worsening R sided weakness and seizure like activity, repeat MRIs showing increase in lesion size. Plan for L frontal awake crani.

## 2024-01-26 NOTE — H&P ADULT - NSHPREVIEWOFSYSTEMS_GEN_ALL_CORE
Current Discharge Medication List        START taking these medications    Details   albuterol sulfate 90 mcg/actuation aebs Take 2 Puffs by inhalation every four (4) hours as needed for Shortness of Breath. Qty: 1 Each, Refills: 0  Start date: 12/14/2022      fluticasone propionate (Flonase Allergy Relief) 50 mcg/actuation nasal spray 2 Sprays by Both Nostrils route daily as needed for Rhinitis. Qty: 1 Each, Refills: 0  Start date: 12/14/2022      levoFLOXacin (Levaquin) 750 mg tablet Take 1 Tablet by mouth daily. Qty: 5 Tablet, Refills: 0  Start date: 12/14/2022           CONTINUE these medications which have NOT CHANGED    Details   ibuprofen (MOTRIN) 200 mg tablet Take 200 mg by mouth as needed for Pain. QUEtiapine (SEROquel) 25 mg tablet Take 0.5 Tablets by mouth nightly. Qty: 45 Tablet, Refills: 0    Associated Diagnoses: Memory change      donepeziL (ARICEPT) 10 mg tablet TAKE 1 TABLET BY MOUTH EVERY NIGHT  Qty: 90 Tablet, Refills: 1    Associated Diagnoses: Memory change      escitalopram oxalate (LEXAPRO) 10 mg tablet Take 1 Tablet by mouth daily (with dinner). Qty: 90 Tablet, Refills: 1    Associated Diagnoses: Memory change      atorvastatin (LIPITOR) 40 mg tablet TAKE 1 TABLET BY MOUTH ONCE DAILY  Qty: 90 Tablet, Refills: 2    Associated Diagnoses: High cholesterol           STOP taking these medications       ergocalciferol (ERGOCALCIFEROL) 1,250 mcg (50,000 unit) capsule Comments:   Reason for Stopping:         aspirin delayed-release 81 mg tablet Comments:   Reason for Stopping:              Allergies reviewed. Allergic to penicillin. We will do Levaquin. Discontinue Augmentin. Does not need azithromycin any longer     Hospitalist Discharge Summary     Patient ID:  Keron Gomez  [id number]  67 y.o.  1944    Admit date: 12/13/2022    Discharge date and time: 12/14/2022    Admission Diagnoses: COPD with acute exacerbation [facility]) [J44.1]    Discharge Diagnoses:     Active General: no recent illnesses, no recent wt gain/loss, no chills  Skin/Breast: no rash, lumps, new moles, erythema, tenderness  Ophthalmologic: no change in vision, diplopia, pain, redness, tearing, dry eyes	  ENMT: no hearing loss, tinnitus, ear pain, vertigo, nasal congestion, epistaxis, sore throat  Respiratory and Thorax: no coughing, wheezing, recent URI, shortness of breath	  Cardiovascular: no chest pain, RIVERO, leg swelling, irregular rhythm   Gastrointestinal: no nausea, vomiting, diarrhea, abd pain, bloody stool, heartburn  Genitourinary: no frequency, dysuria, hematuria  Musculoskeletal: no joint pain, no joint swelling, no tenderness  Neurological: see HPI  Psychiatric: no confusion, no anxiousness, no depression   Hematology/Lymphatics: no brusing, easy bleeding, LAD  Endocrine: no excess urination/thirst, heat/cold intolerance  Allergic/Immunologic: no urticaria, sneezing, recurrent infections Problems:    COPD with acute exacerbation ([facility] [address] (12/13/2022)       Acute hypoxic respiratory failure  Pneumonia, CAP  Recent acute influenza  Rhinitis  Sepsis, early, resolved. Hospital Course:     Patient is a very pleasant 78-year-old lady with history of dyslipidemia and a mild cognitive impairment who presented with cute onset of dyspnea. The patient had recently been diagnosed with acute influenza and was on Tamiflu. She was due for her fifth dose of medication at the time of admission. Her chest x-ray showed no acute infiltrates. However CT was conducted which did show right-sided infiltrates consistent with post influenza pneumonia. She was admitted to the hospitalist service and started on Rocephin and azithromycin. She was given her final dose of Tamiflu. She was provided with supplemental oxygen for O2 saturation of 88%. Subsequently she was able to be weaned from oxygen to room air. Her condition continued to improve. She had nasal congestion. I recommended Flonase. She may have some underlying allergic rhinitis. The following day we reviewed her rapid improvement in her condition. She was feeling ready for discharge. I will prescribe Augmentin for 7 days and azithromycin for 4 more days to complete a 5-day course. I will also prescribe the Flonase and an albuterol inhaler for use as needed. She was discharged home in stable improved condition. PCP: Lynn Rodriguez MD     Consults: None    Condition of patient at discharge: improved    Discharge Exam:    Visit Vitals  /70 (BP 1 Location: Left upper arm, BP Patient Position: At rest)   Pulse 62   Temp 97.6 °F (36.4 °C)   Resp 14   Ht 5' 2.99\" (1.6 m)   Wt 56.7 kg (125 lb)   SpO2 95%   Breastfeeding No   BMI 22.15 kg/m²        General:   No acute distress, resting comfortably in bed. Heart:  RRR, No MRG  Lungs:  CTAB. Non-labored breathing. Abdomen:  Soft . Nondistended. NTTP. BS present.   Extremities:  No edema. Neuro:  Alert and interactive. No focal weakness. Disposition: home    Patient Instructions:   Current Discharge Medication List        START taking these medications    Details   fluticasone propionate (Flonase Allergy Relief) 50 mcg/actuation nasal spray 2 Sprays by Both Nostrils route daily as needed for Rhinitis. Qty: 1 Each, Refills: 0      amoxicillin-clavulanate (Augmentin) 875-125 mg per tablet Take 1 Tablet by mouth two (2) times a day for 7 days. Qty: 14 Tablet, Refills: 0      azithromycin (ZITHROMAX) 500 mg tab Take 1 Tablet by mouth daily for 4 days. Qty: 4 Tablet, Refills: 0      albuterol sulfate 90 mcg/actuation aebs Take 2 Puffs by inhalation every four (4) hours as needed for Shortness of Breath. Qty: 1 Each, Refills: 0           CONTINUE these medications which have NOT CHANGED    Details   ibuprofen (MOTRIN) 200 mg tablet Take 200 mg by mouth as needed for Pain. QUEtiapine (SEROquel) 25 mg tablet Take 0.5 Tablets by mouth nightly. Qty: 45 Tablet, Refills: 0    Associated Diagnoses: Memory change      donepeziL (ARICEPT) 10 mg tablet TAKE 1 TABLET BY MOUTH EVERY NIGHT  Qty: 90 Tablet, Refills: 1    Associated Diagnoses: Memory change      escitalopram oxalate (LEXAPRO) 10 mg tablet Take 1 Tablet by mouth daily (with dinner). Qty: 90 Tablet, Refills: 1    Associated Diagnoses: Memory change      atorvastatin (LIPITOR) 40 mg tablet TAKE 1 TABLET BY MOUTH ONCE DAILY  Qty: 90 Tablet, Refills: 2    Associated Diagnoses: High cholesterol           STOP taking these medications       ergocalciferol (ERGOCALCIFEROL) 1,250 mcg (50,000 unit) capsule Comments:   Reason for Stopping:         aspirin delayed-release 81 mg tablet Comments:   Reason for Stopping:             Activity: Activity as tolerated  Diet: Regular Diet    Follow-up with Travis Loera MD in 1 week.     Approximate time spent in patient care on day of discharge: 35 minutes    Signed:  Kory Farris MD  12/14/2022  4:01 PM

## 2024-01-26 NOTE — PROGRESS NOTE ADULT - ASSESSMENT
ASSESSMENT/PLAN:     s/p Lt frontal awke crani for tmor resection    NEURO:  - Neurochecks q1h  - MRI post-op  - h/o seizures: c/w home keppra 500mg BID  - dex 4q6 taper over 1w to off  - Pain control  - Activity: bed rest for now    PULM:  - Incentive spirometry   - mobilize as tolerated  - Aspiration Precautions    CV:  - -160mmHg  - d/c a-line in AM    RENAL:  - Fluids: IVF until good PO intake  - trend renal function  - d/c rosales in AM    GI:  - Diet: Dysphagia screen and then advance diet as tolerated  - GI prophylaxis: ppi while on ccs  - Bowel regimen standing    ENDO:   - Goal euglycemia (-180)    HEME/ONC:  - monitor H/H    VTE prophylaxis   - SCDs   - hold chemoprophylaxis due to: fresh post op      ID:  - Dee-op antibiotics        
44F hx brain lesion now s/p L frontal crani for tumor resection frozen LGG    -ncq1, vsq1  -dexamethasone taper per nsgy; HISS w/ PPI  -pain management w/ Tylenol & tramadol   -seizure hx - c/w AED keppra 500mg bid  -mri brain prior to discharge   -post operative state   -rosales removal tonight -initiate TOV   -post operative abx   -NS @70cc/hr d/c 9am 1/27

## 2024-01-26 NOTE — H&P ADULT - HISTORY OF PRESENT ILLNESS
44 year old female, no pertinent PMH, presenting for 6 months of progressive R sided weakness. Her symptoms began as intermittent R fingertip numbness for which she went to [place]. MRI on 1/20/23 showed small area of signal abnormality in L posterior frontal subcortical and seep white matter, and 3.7x1.8 cm arachnoid cyst. Numbness continued to progress to entire R arm and R leg occurring several times daily and an aura sensation in her L ear. MRI on 5/30/23 showed progression leading to referral to [facility] Neurosurgery and neurology. Started on keppra 250 BID by Dr. Doty to r/o seizure. Patient continued to endorse worsening seizure like activity, including a "throat closing" sensation up to 5 times daily, MRI 12/20/23 showed increase in size of lesion. MRI with quicktome completed 1/17/24, plan for L frontal awake crani for resection on 1/26/24.  44 year old female, no pertinent PMH, presenting for 6 months of progressive R sided weakness. Her symptoms began as intermittent R fingertip numbness for which she went to [place]. MRI on 1/20/23 showed small area of signal abnormality in L posterior frontal subcortical and deep white matter, and 3.7x1.8 cm arachnoid cyst. Numbness and weakness continued to progress to entire R arm and R leg occurring several times daily and an aura sensation in her L ear. MRI on 5/30/23 showed progression leading to referral to [facility] Neurosurgery and neurology. Started on keppra 250 BID by Dr. Doty to r/o seizure, states no improvement of symptoms. Patient continued to endorse worsening seizure like activity, including a "throat closing" sensation up to 5 times daily, MRI 12/20/23 showed increase in size of lesion. MRI with quicktome completed 1/17/24, plan for L frontal awake crani for resection on 1/26/24.

## 2024-01-26 NOTE — PROGRESS NOTE ADULT - SUBJECTIVE AND OBJECTIVE BOX
INTERVAL HISTORY: HPI:  44 year old female, no pertinent PMH, presenting for 6 months of progressive R sided weakness. Her symptoms began as intermittent R fingertip numbness for which she went to [place]. MRI on 23 showed small area of signal abnormality in L posterior frontal subcortical and deep white matter, and 3.7x1.8 cm arachnoid cyst. Numbness and weakness continued to progress to entire R arm and R leg occurring several times daily and an aura sensation in her L ear. MRI on 23 showed progression leading to referral to [facility] Neurosurgery and neurology. Started on keppra 250 BID by Dr. Doty to r/o seizure, states no improvement of symptoms. Patient continued to endorse worsening seizure like activity, including a "throat closing" sensation up to 5 times daily, MRI 23 showed increase in size of lesion. MRI with quicktome completed 24, plan for L frontal awake crani for resection on 24.  (2024 06:36)      MEDICATIONS  (STANDING):  acetaminophen     Tablet .. 1000 milliGRAM(s) Oral every 8 hours  ceFAZolin   IVPB 2000 milliGRAM(s) IV Intermittent every 8 hours  chlorhexidine 2% Cloths 1 Application(s) Topical <User Schedule>  dexAMETHasone     Tablet   Oral   insulin lispro (ADMELOG) corrective regimen sliding scale   SubCutaneous Before meals and at bedtime  levETIRAcetam 500 milliGRAM(s) Oral two times a day  pantoprazole  Injectable 40 milliGRAM(s) IV Push daily  polyethylene glycol 3350 17 Gram(s) Oral daily  senna 2 Tablet(s) Oral at bedtime  sodium chloride 0.9%. 1000 milliLiter(s) (70 mL/Hr) IV Continuous <Continuous>    MEDICATIONS  (PRN):  traMADol 50 milliGRAM(s) Oral every 4 hours PRN Severe Pain (7 - 10)  traMADol 25 milliGRAM(s) Oral every 4 hours PRN Moderate Pain (4 - 6)      Drug Dosing Weight  Height (cm): 154.9 (2024 07:38)  Weight (kg): 69.3 (2024 07:38)  BMI (kg/m2): 28.9 (2024 07:38)  BSA (m2): 1.68 (2024 07:38)    PAST MEDICAL & SURGICAL HISTORY:  History of seizure  Brain lesion  H/O  section      REVIEW OF SYSTEMS: [ ] Unable to Assess due to neurologic exam   [ ] All ROS addressed below are non-contributory, except:  Neuro: [ ] Headache [ ] Back pain [ ] Numbness [ ] Weakness [ ] Ataxia [ ] Dizziness [ ] Aphasia [ ] Dysarthria [ ] Visual disturbance  Resp: [ ] Shortness of breath/dyspnea, [ ] Orthopnea [ ] Cough  CV: [ ] Chest pain [ ] Palpitation [ ] Lightheadedness [ ] Syncope  Renal: [ ] Thirst [ ] Edema  GI: [ ] Nausea [ ] Emesis [ ] Abdominal pain [ ] Constipation [ ] Diarrhea  Hem: [ ] Hematemesis [ ] bright red blood per rectum  ID: [ ] Fever [ ] Chills [ ] Dysuria  ENT: [ ] Rhinorrhea    PHYSICAL EXAM:    General: No Acute Distress     Neurological: Awake, alert oriented to person, place and time, Following Commands, PERRL, EOMI, Face Symmetrical, Speech Fluent, Moving all extremities, Muscle Strength normal in all four extremities, No Drift, Sensation to Light Touch Intact    Pulmonary: Clear to Auscultation, No Rales, No Rhonchi, No Wheezes     Cardiovascular: S1, S2, Regular Rate and Rhythm     Gastrointestinal: Soft, Nontender, Nondistended     Extremities: No calf tenderness         ICU Vital Signs Last 24 Hrs  T(C): 36.6 (2024 14:10), Max: 36.6 (2024 11:48)  T(F): 97.9 (2024 14:10), Max: 97.9 (2024 14:10)  HR: 71 (2024 20:00) (55 - 92)  BP: 74/49 (2024 12:30) (74/49 - 118/56)  BP(mean): 54 (2024 12:30) (54 - 80)  ABP: 90/55 (2024 20:00) (90/55 - 136/58)  ABP(mean): 70 (2024 20:00) (64 - 84)  RR: 24 (2024 20:00) (13 - 24)  SpO2: 97% (2024 20:00) (95% - 100%)    O2 Parameters below as of 2024 20:00  Patient On (Oxygen Delivery Method): room air            I&O's Detail    2024 07:01  -  2024 20:14  --------------------------------------------------------  IN:    IV PiggyBack: 600 mL    Oral Fluid: 960 mL    sodium chloride 0.9%: 630 mL    Sodium Chloride 0.9% Bolus: 1000 mL  Total IN: 3190 mL    OUT:    Voided (mL): 1100 mL  Total OUT: 1100 mL    Total NET: 2090 mL              LABS:  CBC Full  -  ( 2024 12:00 )  WBC Count : 12.04 K/uL  RBC Count : 3.68 M/uL  Hemoglobin : 11.4 g/dL  Hematocrit : 33.6 %  Platelet Count - Automated : 246 K/uL  Mean Cell Volume : 91.3 fl  Mean Cell Hemoglobin : 31.0 pg  Mean Cell Hemoglobin Concentration : 33.9 gm/dL  Auto Neutrophil # : x  Auto Lymphocyte # : x  Auto Monocyte # : x  Auto Eosinophil # : x  Auto Basophil # : x  Auto Neutrophil % : x  Auto Lymphocyte % : x  Auto Monocyte % : x  Auto Eosinophil % : x  Auto Basophil % : x        137  |  108  |  10  ----------------------------<  152<H>  4.6   |  22  |  0.70    Ca    8.5      2024 12:00  Phos  1.7       Mg     1.7           PT/INR - ( 2024 12:00 )   PT: 10.4 sec;   INR: 0.91          PTT - ( 2024 12:00 )  PTT:28.7 sec  Urinalysis Basic - ( 2024 12:00 )    Color: x / Appearance: x / SG: x / pH: x  Gluc: 152 mg/dL / Ketone: x  / Bili: x / Urobili: x   Blood: x / Protein: x / Nitrite: x   Leuk Esterase: x / RBC: x / WBC x   Sq Epi: x / Non Sq Epi: x / Bacteria: x    RADIOLOGY & ADDITIONAL STUDIES:   INTERVAL HISTORY: HPI:  44 year old female, no pertinent PMH, presenting for 6 months of progressive R sided weakness. Her symptoms began as intermittent R fingertip numbness for which she went to [place]. MRI on 23 showed small area of signal abnormality in L posterior frontal subcortical and deep white matter, and 3.7x1.8 cm arachnoid cyst. Numbness and weakness continued to progress to entire R arm and R leg occurring several times daily and an aura sensation in her L ear. MRI on 23 showed progression leading to referral to [facility] Neurosurgery and neurology. Started on keppra 250 BID by Dr. Doty to r/o seizure, states no improvement of symptoms. Patient continued to endorse worsening seizure like activity, including a "throat closing" sensation up to 5 times daily, MRI 23 showed increase in size of lesion. MRI with quicktome completed 24, plan for L frontal awake crani for resection on 24.  (2024 06:36)      MEDICATIONS  (STANDING):  acetaminophen     Tablet .. 1000 milliGRAM(s) Oral every 8 hours  ceFAZolin   IVPB 2000 milliGRAM(s) IV Intermittent every 8 hours  chlorhexidine 2% Cloths 1 Application(s) Topical <User Schedule>  dexAMETHasone     Tablet   Oral   insulin lispro (ADMELOG) corrective regimen sliding scale   SubCutaneous Before meals and at bedtime  levETIRAcetam 500 milliGRAM(s) Oral two times a day  pantoprazole  Injectable 40 milliGRAM(s) IV Push daily  polyethylene glycol 3350 17 Gram(s) Oral daily  senna 2 Tablet(s) Oral at bedtime  sodium chloride 0.9%. 1000 milliLiter(s) (70 mL/Hr) IV Continuous <Continuous>    MEDICATIONS  (PRN):  traMADol 50 milliGRAM(s) Oral every 4 hours PRN Severe Pain (7 - 10)  traMADol 25 milliGRAM(s) Oral every 4 hours PRN Moderate Pain (4 - 6)      Drug Dosing Weight  Height (cm): 154.9 (2024 07:38)  Weight (kg): 69.3 (2024 07:38)  BMI (kg/m2): 28.9 (2024 07:38)  BSA (m2): 1.68 (2024 07:38)    PAST MEDICAL & SURGICAL HISTORY:  History of seizure  Brain lesion  H/O  section      REVIEW OF SYSTEMS: [ ] Unable to Assess due to neurologic exam   [ ] All ROS addressed below are non-contributory, except:  Neuro: [ ] Headache [ ] Back pain [x] Numbness [ ] Weakness [ ] Ataxia [ ] Dizziness [ ] Aphasia [ ] Dysarthria [ ] Visual disturbance  Resp: [ ] Shortness of breath/dyspnea, [ ] Orthopnea [ ] Cough  CV: [ ] Chest pain [ ] Palpitation [ ] Lightheadedness [ ] Syncope  Renal: [ ] Thirst [ ] Edema  GI: [ ] Nausea [ ] Emesis [ ] Abdominal pain [ ] Constipation [ ] Diarrhea  Hem: [ ] Hematemesis [ ] bright red blood per rectum  ID: [ ] Fever [ ] Chills [ ] Dysuria  ENT: [ ] Rhinorrhea    PHYSICAL EXAM:    General: No Acute Distress     Neurological: Awake, alert oriented to person, place and time, Following Commands, PERRL, EOMI, Face Symmetrical, Speech Fluent, Moving all extremities, Muscle Strength normal in all four extremities, No Drift, Left side decreased sensation to light touch     Pulmonary: Clear to Auscultation, No Rales, No Rhonchi, No Wheezes     Cardiovascular: S1, S2, Regular Rate and Rhythm     Gastrointestinal: Soft, Nontender, Nondistended     Extremities: No calf tenderness         ICU Vital Signs Last 24 Hrs  T(C): 36.6 (2024 14:10), Max: 36.6 (2024 11:48)  T(F): 97.9 (2024 14:10), Max: 97.9 (2024 14:10)  HR: 71 (2024 20:00) (55 - 92)  BP: 74/49 (2024 12:30) (74/49 - 118/56)  BP(mean): 54 (2024 12:30) (54 - 80)  ABP: 90/55 (2024 20:00) (90/55 - 136/58)  ABP(mean): 70 (2024 20:00) (64 - 84)  RR: 24 (2024 20:00) (13 - 24)  SpO2: 97% (2024 20:00) (95% - 100%)    O2 Parameters below as of 2024 20:00  Patient On (Oxygen Delivery Method): room air            I&O's Detail    2024 07:01  -  2024 20:14  --------------------------------------------------------  IN:    IV PiggyBack: 600 mL    Oral Fluid: 960 mL    sodium chloride 0.9%: 630 mL    Sodium Chloride 0.9% Bolus: 1000 mL  Total IN: 3190 mL    OUT:    Voided (mL): 1100 mL  Total OUT: 1100 mL    Total NET: 2090 mL              LABS:  CBC Full  -  ( 2024 12:00 )  WBC Count : 12.04 K/uL  RBC Count : 3.68 M/uL  Hemoglobin : 11.4 g/dL  Hematocrit : 33.6 %  Platelet Count - Automated : 246 K/uL  Mean Cell Volume : 91.3 fl  Mean Cell Hemoglobin : 31.0 pg  Mean Cell Hemoglobin Concentration : 33.9 gm/dL  Auto Neutrophil # : x  Auto Lymphocyte # : x  Auto Monocyte # : x  Auto Eosinophil # : x  Auto Basophil # : x  Auto Neutrophil % : x  Auto Lymphocyte % : x  Auto Monocyte % : x  Auto Eosinophil % : x  Auto Basophil % : x        137  |  108  |  10  ----------------------------<  152<H>  4.6   |  22  |  0.70    Ca    8.5      2024 12:00  Phos  1.7       Mg     1.7           PT/INR - ( 2024 12:00 )   PT: 10.4 sec;   INR: 0.91          PTT - ( 2024 12:00 )  PTT:28.7 sec  Urinalysis Basic - ( 2024 12:00 )    Color: x / Appearance: x / SG: x / pH: x  Gluc: 152 mg/dL / Ketone: x  / Bili: x / Urobili: x   Blood: x / Protein: x / Nitrite: x   Leuk Esterase: x / RBC: x / WBC x   Sq Epi: x / Non Sq Epi: x / Bacteria: x    RADIOLOGY & ADDITIONAL STUDIES:

## 2024-01-26 NOTE — PROGRESS NOTE ADULT - SUBJECTIVE AND OBJECTIVE BOX
NEUROSURGERY POST OP NOTE:    POD# 0 S/P L frontal awake crani for tumor resection.     S: Patient seen and examined at bedside, offers no complaints at this time.      T(C): 36.6 (01-26-24 @ 11:48), Max: 36.6 (01-26-24 @ 11:48)  HR: 77 (01-26-24 @ 12:30) (55 - 77)  BP: 74/49 (01-26-24 @ 12:30) (74/49 - 118/56)  RR: 13 (01-26-24 @ 12:30) (13 - 18)  SpO2: 95% (01-26-24 @ 12:30) (95% - 100%)      01-26-24 @ 07:01  -  01-26-24 @ 12:40  --------------------------------------------------------  IN: 140 mL / OUT: 0 mL / NET: 140 mL        acetaminophen     Tablet .. 1000 milliGRAM(s) Oral every 8 hours  ceFAZolin   IVPB 2000 milliGRAM(s) IV Intermittent every 8 hours  chlorhexidine 2% Cloths 1 Application(s) Topical <User Schedule>  dexAMETHasone  Injectable 4 milliGRAM(s) IV Push every 6 hours  insulin lispro (ADMELOG) corrective regimen sliding scale   SubCutaneous Before meals and at bedtime  levETIRAcetam 500 milliGRAM(s) Oral two times a day  magnesium sulfate  IVPB 2 Gram(s) IV Intermittent once  pantoprazole  Injectable 40 milliGRAM(s) IV Push daily  polyethylene glycol 3350 17 Gram(s) Oral daily  senna 2 Tablet(s) Oral at bedtime  sodium chloride 0.9% Bolus 1000 milliLiter(s) IV Bolus once  sodium chloride 0.9%. 1000 milliLiter(s) IV Continuous <Continuous>  sodium phosphate 30 milliMole(s)/500 mL IVPB 30 milliMole(s) IV Intermittent once      RADIOLOGY:     Exam:  Constitutional: Patient is resting comfortably in stretcher, in NAD  Respiratory: breathing non-labored, symmetrical chest wall movement  Cardiovascuar: RRR, no murmurs  Gastrointestinal: abdomen soft, non tender  Genitourinary: exam deffered  Neurological:  AAOX3. Verbal function intact  Cranial Nerves: II-XII grossly intact  Motor: 5/5 power in b/l UE and LE  Sensation: intact to light touch in all extremities  Pronator Drift: none  WOUND: L frontal crani incision with headwrap in place, C/D/I    Assessment: 44 year old female, no pertinent PMH, presenting for 6 months of progressive R sided weakness. MRI on 1/20/23 showed small area of signal abnormality in L posterior frontal subcortical and deep white matter, and 3.7x1.8 cm arachnoid cyst. Pt continued to experience worsening R sided weakness and seizure like activity, repeat MRIs showing increase in lesion size. Plan for L frontal awake crani.     Neuro:  - neuro/vitals q1h  - pain control with cranial ERAS  - h/o seizures: c/w home keppra 500mg BID  - dex 4q6 taper over 1w to off  - pending postop MR    CV:  - SBP <140    Resp:  - RA  - IS    GI:  - ADAT  - bowel regimen  - protonix while on steroids    Renal:  - IVF while NPO  - TOV    Endo:  - ISS, f/u A1c    Heme:  - SCDs for DVT ppx    ID:  - postop ancef    Dispo: NSICU, full code, pending PT/OT  Discussed with Dr. D'Amico, Dr Singh

## 2024-01-26 NOTE — H&P ADULT - NSHPLABSRESULTS_GEN_ALL_CORE
< from: MR Head w/wo IV Cont (01.17.24 @ 10:46) >    FINDINGS:    Since prior exam, there is a stable left frontal opercular lesion which   measures 2.2 cm transverse by 1.7 cm AP by 1.6 cm craniocaudal (series 24   image 98 and series 16 image 18). Again demonstrated is intrinsic T1   hyperintense signal and hypointense signal on SWI within the lesion   consistent with blood products which is stable in appearance from   12/20/2023 although again noted to have change in appearance compared to   9/6/2023 as described on prior exam. Again there is questionable   peripheral enhancement which is difficult to evaluate given presence of   T1 hyperintensity. There is no new lesion. There is no significant mass   effect.    Stable right middle cranial fossa arachnoid cyst.    There is no hydrocephalus. There is no acute intracranial hemorrhage or   midline shift. There is no evidence of recent infarction   diffusion-weighted imaging. The paranasal sinuses and mastoid air cells   are well-aerated. The vascular flow voids are maintained..    IMPRESSION:    Since 12/20/2023, stable left frontal opercular lesion as described above.    --- End of Report ---    < end of copied text >

## 2024-01-26 NOTE — H&P ADULT - PROBLEM SELECTOR PLAN 1
- L frontal awake craniotomy - L frontal awake craniotomy for resection - L frontal awake craniotomy for resection.

## 2024-01-26 NOTE — H&P ADULT - NSICDXFAMILYHX_GEN_ALL_CORE_FT
FAMILY HISTORY:  Mother  Still living? Unknown  Family history of CVA, Age at diagnosis: Age Unknown  FH: aneurysm, Age at diagnosis: Age Unknown

## 2024-01-26 NOTE — H&P ADULT - NSHPPHYSICALEXAM_GEN_ALL_CORE
Constitutional: NAD, resting comfortably in bed.   HEENT: Pupils equal, round, reactive to light.   Respiratory: No respiratory distress, lungs clear to auscultation bilaterally.   Cardiovascuar: RRR, S1, S2.   Gastrointestinal: Abdomen soft, non tender, nondistended.  Genitourinary: exam deffered  Neurological:  AAOX3. Opens eyes. Follows commands. Speech clear.  Cranial Nerves: II-XII intact  Motor: 5/5 power in b/l UE and LE  Sensation: intact to light touch in all extremities  Pronator Drift: ***  Dysmetria: ***  Extremities: Warm, well perfused.  Wounds: Constitutional: NAD, sitting comfortably in chair.   HEENT: Pupils equal, round, reactive to light. EOMI.  Respiratory: No respiratory distress, lungs clear to auscultation bilaterally.   Cardiovascular: RRR, S1, S2.   Gastrointestinal: Abdomen soft, non tender, nondistended.  Neurological:  AAOX3. Opens eyes. Follows commands. Speech clear.  Cranial Nerves: II-XII intact.   Motor: 4+ distal RUE. 5/5 in prox RUE, RLE, LUE, LLE.   Sensation: +decreased sensation in RUE. Left side sensation intact.   Pronator Drift: none  Dysmetria: none  Extremities: Warm, well perfused.

## 2024-01-26 NOTE — PROGRESS NOTE ADULT - SUBJECTIVE AND OBJECTIVE BOX
NSCU Progress Note    Assessment/Hospital Course:    44 year old female, no pertinent PMH, presenting for 6 months of progressive R sided weakness. MRI on 1/20/23 showed small area of signal abnormality in L posterior frontal subcortical and deep white matter, and 3.7x1.8 cm arachnoid cyst. Pt continued to experience worsening R sided weakness and seizure like activity, repeat MRIs showing increase in lesion size. Plan for L frontal awake crani.     24 Hour Events/Subjective:  - POD0 s/p L awake crani for tumor      REVIEW OF SYSTEMS:  - negative except as above    VITALS:   - T(C): 36.6 (01-26-24 @ 11:48), Max: 36.6 (01-26-24 @ 11:48)  T(F): 97.8 (01-26-24 @ 11:48), Max: 97.8 (01-26-24 @ 11:48)  HR: 77 (01-26-24 @ 12:30) (55 - 77)  BP: 74/49 (01-26-24 @ 12:30) (74/49 - 118/56)  ABP: 113/53 (01-26-24 @ 12:30) (112/54 - 136/58)  ABP(mean): 70 (01-26-24 @ 12:30) (64 - 84)  RR: 13 (01-26-24 @ 12:30) (13 - 18)  SpO2: 95% (01-26-24 @ 12:30) (95% - 100%)      IMAGING/DATA:   - Reviewed          PHYSICAL EXAM:    General: calm  CVS: RRR  Pulm: CTAB  GI: Soft, NTND  Extremities: No LE Edema  Neuro: AOx3, PERRL, EOMI, facial symmetrical, fluent speech, motor 5/5 throughout, no PND, sensation in tact

## 2024-01-27 LAB
A1C WITH ESTIMATED AVERAGE GLUCOSE RESULT: 5.3 % — SIGNIFICANT CHANGE UP (ref 4–5.6)
ANION GAP SERPL CALC-SCNC: 10 MMOL/L — SIGNIFICANT CHANGE UP (ref 5–17)
BASOPHILS # BLD AUTO: 0.03 K/UL — SIGNIFICANT CHANGE UP (ref 0–0.2)
BASOPHILS NFR BLD AUTO: 0.1 % — SIGNIFICANT CHANGE UP (ref 0–2)
BUN SERPL-MCNC: 10 MG/DL — SIGNIFICANT CHANGE UP (ref 7–23)
CALCIUM SERPL-MCNC: 8.3 MG/DL — LOW (ref 8.4–10.5)
CHLORIDE SERPL-SCNC: 110 MMOL/L — HIGH (ref 96–108)
CO2 SERPL-SCNC: 21 MMOL/L — LOW (ref 22–31)
CREAT SERPL-MCNC: 0.67 MG/DL — SIGNIFICANT CHANGE UP (ref 0.5–1.3)
EGFR: 109 ML/MIN/1.73M2 — SIGNIFICANT CHANGE UP
EOSINOPHIL # BLD AUTO: 0 K/UL — SIGNIFICANT CHANGE UP (ref 0–0.5)
EOSINOPHIL NFR BLD AUTO: 0 % — SIGNIFICANT CHANGE UP (ref 0–6)
ESTIMATED AVERAGE GLUCOSE: 105 MG/DL — SIGNIFICANT CHANGE UP (ref 68–114)
GLUCOSE BLDC GLUCOMTR-MCNC: 148 MG/DL — HIGH (ref 70–99)
GLUCOSE BLDC GLUCOMTR-MCNC: 153 MG/DL — HIGH (ref 70–99)
GLUCOSE BLDC GLUCOMTR-MCNC: 164 MG/DL — HIGH (ref 70–99)
GLUCOSE BLDC GLUCOMTR-MCNC: 188 MG/DL — HIGH (ref 70–99)
GLUCOSE SERPL-MCNC: 155 MG/DL — HIGH (ref 70–99)
HCT VFR BLD CALC: 31.5 % — LOW (ref 34.5–45)
HCT VFR BLD CALC: 31.5 % — LOW (ref 34.5–45)
HGB BLD-MCNC: 10.3 G/DL — LOW (ref 11.5–15.5)
HGB BLD-MCNC: 10.4 G/DL — LOW (ref 11.5–15.5)
IMM GRANULOCYTES NFR BLD AUTO: 0.8 % — SIGNIFICANT CHANGE UP (ref 0–0.9)
LYMPHOCYTES # BLD AUTO: 0.97 K/UL — LOW (ref 1–3.3)
LYMPHOCYTES # BLD AUTO: 4.8 % — LOW (ref 13–44)
MAGNESIUM SERPL-MCNC: 2 MG/DL — SIGNIFICANT CHANGE UP (ref 1.6–2.6)
MCHC RBC-ENTMCNC: 30.6 PG — SIGNIFICANT CHANGE UP (ref 27–34)
MCHC RBC-ENTMCNC: 30.7 PG — SIGNIFICANT CHANGE UP (ref 27–34)
MCHC RBC-ENTMCNC: 32.7 GM/DL — SIGNIFICANT CHANGE UP (ref 32–36)
MCHC RBC-ENTMCNC: 33 GM/DL — SIGNIFICANT CHANGE UP (ref 32–36)
MCV RBC AUTO: 92.9 FL — SIGNIFICANT CHANGE UP (ref 80–100)
MCV RBC AUTO: 93.5 FL — SIGNIFICANT CHANGE UP (ref 80–100)
MONOCYTES # BLD AUTO: 0.97 K/UL — HIGH (ref 0–0.9)
MONOCYTES NFR BLD AUTO: 4.8 % — SIGNIFICANT CHANGE UP (ref 2–14)
NEUTROPHILS # BLD AUTO: 17.97 K/UL — HIGH (ref 1.8–7.4)
NEUTROPHILS NFR BLD AUTO: 89.5 % — HIGH (ref 43–77)
NRBC # BLD: 0 /100 WBCS — SIGNIFICANT CHANGE UP (ref 0–0)
NRBC # BLD: 0 /100 WBCS — SIGNIFICANT CHANGE UP (ref 0–0)
PHOSPHATE SERPL-MCNC: 2.7 MG/DL — SIGNIFICANT CHANGE UP (ref 2.5–4.5)
PLATELET # BLD AUTO: 216 K/UL — SIGNIFICANT CHANGE UP (ref 150–400)
PLATELET # BLD AUTO: 224 K/UL — SIGNIFICANT CHANGE UP (ref 150–400)
POTASSIUM SERPL-MCNC: 4.1 MMOL/L — SIGNIFICANT CHANGE UP (ref 3.5–5.3)
POTASSIUM SERPL-SCNC: 4.1 MMOL/L — SIGNIFICANT CHANGE UP (ref 3.5–5.3)
RBC # BLD: 3.37 M/UL — LOW (ref 3.8–5.2)
RBC # BLD: 3.39 M/UL — LOW (ref 3.8–5.2)
RBC # FLD: 11.9 % — SIGNIFICANT CHANGE UP (ref 10.3–14.5)
RBC # FLD: 12 % — SIGNIFICANT CHANGE UP (ref 10.3–14.5)
SODIUM SERPL-SCNC: 141 MMOL/L — SIGNIFICANT CHANGE UP (ref 135–145)
WBC # BLD: 20.11 K/UL — HIGH (ref 3.8–10.5)
WBC # BLD: 20.7 K/UL — HIGH (ref 3.8–10.5)
WBC # FLD AUTO: 20.11 K/UL — HIGH (ref 3.8–10.5)
WBC # FLD AUTO: 20.7 K/UL — HIGH (ref 3.8–10.5)

## 2024-01-27 RX ORDER — ACETAMINOPHEN 500 MG
1000 TABLET ORAL ONCE
Refills: 0 | Status: COMPLETED | OUTPATIENT
Start: 2024-01-27 | End: 2024-01-27

## 2024-01-27 RX ORDER — SODIUM,POTASSIUM PHOSPHATES 278-250MG
1 POWDER IN PACKET (EA) ORAL ONCE
Refills: 0 | Status: COMPLETED | OUTPATIENT
Start: 2024-01-27 | End: 2024-01-27

## 2024-01-27 RX ORDER — ENOXAPARIN SODIUM 100 MG/ML
40 INJECTION SUBCUTANEOUS
Refills: 0 | Status: DISCONTINUED | OUTPATIENT
Start: 2024-01-27 | End: 2024-01-28

## 2024-01-27 RX ORDER — SODIUM CHLORIDE 9 MG/ML
1000 INJECTION, SOLUTION INTRAVENOUS
Refills: 0 | Status: DISCONTINUED | OUTPATIENT
Start: 2024-01-27 | End: 2024-01-27

## 2024-01-27 RX ORDER — SODIUM CHLORIDE 9 MG/ML
500 INJECTION, SOLUTION INTRAVENOUS ONCE
Refills: 0 | Status: COMPLETED | OUTPATIENT
Start: 2024-01-27 | End: 2024-01-27

## 2024-01-27 RX ORDER — SODIUM CHLORIDE 9 MG/ML
500 INJECTION INTRAMUSCULAR; INTRAVENOUS; SUBCUTANEOUS ONCE
Refills: 0 | Status: DISCONTINUED | OUTPATIENT
Start: 2024-01-27 | End: 2024-01-27

## 2024-01-27 RX ORDER — HYDROMORPHONE HYDROCHLORIDE 2 MG/ML
0.25 INJECTION INTRAMUSCULAR; INTRAVENOUS; SUBCUTANEOUS ONCE
Refills: 0 | Status: DISCONTINUED | OUTPATIENT
Start: 2024-01-27 | End: 2024-01-27

## 2024-01-27 RX ADMIN — PANTOPRAZOLE SODIUM 40 MILLIGRAM(S): 20 TABLET, DELAYED RELEASE ORAL at 06:13

## 2024-01-27 RX ADMIN — Medication 4 MILLIGRAM(S): at 17:46

## 2024-01-27 RX ADMIN — TRAMADOL HYDROCHLORIDE 50 MILLIGRAM(S): 50 TABLET ORAL at 08:50

## 2024-01-27 RX ADMIN — LEVETIRACETAM 500 MILLIGRAM(S): 250 TABLET, FILM COATED ORAL at 17:46

## 2024-01-27 RX ADMIN — Medication 1000 MILLIGRAM(S): at 15:03

## 2024-01-27 RX ADMIN — TRAMADOL HYDROCHLORIDE 50 MILLIGRAM(S): 50 TABLET ORAL at 09:30

## 2024-01-27 RX ADMIN — Medication 2: at 11:35

## 2024-01-27 RX ADMIN — HYDROMORPHONE HYDROCHLORIDE 0.25 MILLIGRAM(S): 2 INJECTION INTRAMUSCULAR; INTRAVENOUS; SUBCUTANEOUS at 00:00

## 2024-01-27 RX ADMIN — TRAMADOL HYDROCHLORIDE 50 MILLIGRAM(S): 50 TABLET ORAL at 17:14

## 2024-01-27 RX ADMIN — Medication 2: at 17:46

## 2024-01-27 RX ADMIN — SENNA PLUS 2 TABLET(S): 8.6 TABLET ORAL at 21:36

## 2024-01-27 RX ADMIN — Medication 2: at 21:42

## 2024-01-27 RX ADMIN — Medication 62.5 MILLIMOLE(S): at 00:13

## 2024-01-27 RX ADMIN — TRAMADOL HYDROCHLORIDE 50 MILLIGRAM(S): 50 TABLET ORAL at 18:14

## 2024-01-27 RX ADMIN — Medication 1000 MILLIGRAM(S): at 06:13

## 2024-01-27 RX ADMIN — ENOXAPARIN SODIUM 40 MILLIGRAM(S): 100 INJECTION SUBCUTANEOUS at 21:35

## 2024-01-27 RX ADMIN — HYDROMORPHONE HYDROCHLORIDE 0.25 MILLIGRAM(S): 2 INJECTION INTRAMUSCULAR; INTRAVENOUS; SUBCUTANEOUS at 00:17

## 2024-01-27 RX ADMIN — CHLORHEXIDINE GLUCONATE 1 APPLICATION(S): 213 SOLUTION TOPICAL at 06:14

## 2024-01-27 RX ADMIN — Medication 1 PACKET(S): at 07:20

## 2024-01-27 RX ADMIN — Medication 4 MILLIGRAM(S): at 11:35

## 2024-01-27 RX ADMIN — Medication 1000 MILLIGRAM(S): at 13:55

## 2024-01-27 RX ADMIN — TRAMADOL HYDROCHLORIDE 50 MILLIGRAM(S): 50 TABLET ORAL at 00:00

## 2024-01-27 RX ADMIN — LEVETIRACETAM 500 MILLIGRAM(S): 250 TABLET, FILM COATED ORAL at 06:13

## 2024-01-27 RX ADMIN — TRAMADOL HYDROCHLORIDE 25 MILLIGRAM(S): 50 TABLET ORAL at 08:00

## 2024-01-27 RX ADMIN — Medication 4 MILLIGRAM(S): at 06:13

## 2024-01-27 RX ADMIN — SODIUM CHLORIDE 500 MILLILITER(S): 9 INJECTION, SOLUTION INTRAVENOUS at 09:18

## 2024-01-27 RX ADMIN — Medication 1000 MILLIGRAM(S): at 07:00

## 2024-01-27 RX ADMIN — POLYETHYLENE GLYCOL 3350 17 GRAM(S): 17 POWDER, FOR SOLUTION ORAL at 11:35

## 2024-01-27 RX ADMIN — Medication 400 MILLIGRAM(S): at 21:34

## 2024-01-27 RX ADMIN — TRAMADOL HYDROCHLORIDE 25 MILLIGRAM(S): 50 TABLET ORAL at 07:24

## 2024-01-27 NOTE — OCCUPATIONAL THERAPY INITIAL EVALUATION ADULT - PERTINENT HX OF CURRENT PROBLEM, REHAB EVAL
44 year old female, no pertinent PMH, presenting for 6 months of progressive R sided weakness. MRI on 1/20/23 showed small area of signal abnormality in L posterior frontal subcortical and deep white matter, and 3.7x1.8 cm arachnoid cyst. Pt continued to experience worsening R sided weakness and seizure like activity, repeat MRIs showing increase in lesion size, now s/p L frontal awake craniotomy for tumor (1/26).

## 2024-01-27 NOTE — OCCUPATIONAL THERAPY INITIAL EVALUATION ADULT - SENSORY TESTS
Visual fields are full to confrontation, H and Quad test intact, Eye movements are intact without nystagmus, Pupils equally round and reactive to light, facial sensation V1-V3 equal and intact, face is symmetric with normal eye closure and smile, tongue protrudes midlines, shoulder shrugs intact, puffing cheeks intact, b/l eyebrow shrugs intact,  hearing bilaterally with rubs intact

## 2024-01-27 NOTE — PHYSICAL THERAPY INITIAL EVALUATION ADULT - NSPTDISCHREC_GEN_A_CORE
home w/ assist from wife and no additional PT needs, offered HPT but pt refusing home w/ no additional PT needs

## 2024-01-27 NOTE — OCCUPATIONAL THERAPY INITIAL EVALUATION ADULT - REHAB POTENTIAL, OT EVAL
Patient demonstrates safe and independent performance of ADL's, and functional mobility with no A and is d/c from skilled OT at this time.

## 2024-01-27 NOTE — OCCUPATIONAL THERAPY INITIAL EVALUATION ADULT - SHORT TERM MEMORY, REHAB EVAL
07/26/2023  Foot and Ankle Surgery - Established Patient/Follow-up  Provider: Dr. Rodolfo Yañez DPM  Location: [facility]    Subjective:  Bharathi Darnell is a 60 y.o. male.     Chief Complaint   Patient presents with    Right Foot - Follow-up, Pain     Mri results today    Follow-up     JAMEE Caldera md 5/26/2023       HPI: The patient obtained an MRI of his right foot at the hospital. He reports intermittent pain in his right foot. He desires to monitor the mass at this time. He inquires about a temporary handicap parking placard. The patient requests a new postoperative shoe as it cracks on the lateral aspect of his foot.    The patient was diagnosed with CLL approximately 1 year ago. He will begin treatment next month.    Allergies   Allergen Reactions    Bactrim [Sulfamethoxazole-Trimethoprim] Rash       Current Outpatient Medications on File Prior to Visit   Medication Sig Dispense Refill    allopurinol (ZYLOPRIM) 300 MG tablet       amLODIPine (NORVASC) 10 MG tablet TAKE 1 TABLET BY MOUTH EVERY DAY 90 tablet 1    Blood Glucose Monitoring Suppl (Accu-Chek Guide) w/Device kit 1 Device Daily. 1 kit 0    cefdinir (OMNICEF) 300 MG capsule Take 1 capsule by mouth 2 (Two) Times a Day. 20 capsule 0    glucose blood (Accu-Chek Guide) test strip 1 each by Other route Daily. 100 each 0    hydrocortisone 2.5 % cream Apply 1 application topically to the appropriate area as directed 2 (Two) Times a Day. 28 g 0    Lancets (accu-chek multiclix) lancets 1 each by Other route Daily. 100 each 0    lisinopril (PRINIVIL,ZESTRIL) 40 MG tablet TAKE 1 TABLET BY MOUTH EVERY DAY FOR BLOOD PRESSURE 90 tablet 3    metFORMIN (GLUCOPHAGE) 500 MG tablet Take 2 tablets by mouth 2 (Two) Times a Day for 90 days. 360 tablet 0    mupirocin (BACTROBAN) 2 % cream Apply 1 application topically to the appropriate area as directed 3 (Three) Times a Day. 30 g 0    sildenafil (VIAGRA) 100 MG tablet TAKE 1 TABLET BY MOUTH PRE INTERCOURSE 10  intact "tablet 11    triamcinolone (KENALOG) 0.1 % cream Apply 1 application topically to the appropriate area as directed 2 (Two) Times a Day. 28.4 g 0    vitamin D (ERGOCALCIFEROL) 1.25 MG (07498 UT) capsule capsule Take 1 capsule by mouth 1 (One) Time Per Week. 12 capsule 1     No current facility-administered medications on file prior to visit.       Objective   Resp 20   Ht 188 cm (74\")   Wt 105 kg (231 lb)   BMI 29.66 kg/m²     Foot/Ankle Exam    GENERAL  Appearance:  appears stated age  Orientation:  AAOx3  Affect:  appropriate  Gait:  unimpaired  Assistance:  independent  Right shoe gear: casual shoe and sock  Left shoe gear: casual shoe and sock    VASCULAR     Right Foot Vascularity   Normal vascular exam    Dorsalis pedis:  2+  Posterior tibial:  2+  Skin temperature:  warm  Edema grading:  None  CFT:  < 3 seconds  Pedal hair growth:  Present  Varicosities:  none     NEUROLOGIC     Right Foot Neurologic   Light touch sensation: normal  Protective Sensation using [place] Monofilament:   Sites intact: 2  Sites tested: 10  Achilles reflex:  2+    MUSCULOSKELETAL     Right Foot Musculoskeletal   Ecchymosis:  none  Tenderness:  none    Arch:  Normal    MUSCLE STRENGTH     Right Foot Muscle Strength   Normal strength    Foot dorsiflexion:  5  Foot plantar flexion:  5  Foot inversion:  5  Foot eversion:  5    DERMATOLOGIC      Right Foot Dermatologic   Skin  Positive for skin changes.     TESTS     Right Foot Tests   Anterior drawer: negative  Varus tilt: negative     Right foot additional comments: Swelling/nonpitting edema to right 3rd toe. Color changes and scarring noted to the plantar arch. No drainage, no signs of infection.    06/01/2023:  No significant pain with palpation. Decreased sensation with monofilament testing and light touch to the right foot. Continued swelling involving the third digit.    07/26/2023: No significant changes involving the right third toe.    Assessment & Plan   Diagnoses " and all orders for this visit:    1. Right foot pain (Primary)    2. Tenosynovial giant cell tumor of foot    3. Swelling of toe of right foot    4. Type 2 diabetes mellitus with hyperglycemia, without long-term current use of insulin    5. CLL (chronic lymphocytic leukemia)        The patient is a 60-year-old male who presents to the office today for a follow-up regarding his right foot.  MRI of the right foot was independently reviewed and discussed with patient.  Findings are consistent with giant cell appearing tumor involving the plantar aspect of the second toe.  I discussed the diagnosis and treatment options with the patient.  Patient is relatively symptomatic.  I did discuss options with him given that he is symptomatic including attempted resection and biopsy.  I did explain that this does have risk given the size of the soft tissue lesion which may complicate the overall viability of the toe.  We also discussed the possibility of second toe amputation for definitive management.  Patient is unsure that he would like to proceed with surgery at this time.  He would like to proceed with observation.  He does understand that formal diagnosis will only come from a biopsy and that symptoms could progress and create further concerns.  Patient understands and agrees.  I have asked that he follow-up with me in 2 months for reevaluation and further discussion.  Greater than 20 minutes spent before, during, and after evaluation for patient care.    No orders of the defined types were placed in this encounter.         Note is dictated utilizing voice recognition software. Unfortunately this leads to occasional typographical errors. I apologize in advance if the situation occurs. If questions occur please do not hesitate to call our office.    Transcribed from ambient dictation for ANTHONY Yañez DPM by Lolis Schaffer.  07/26/23   13:50 EDT    Patient or patient representative verbalized consent to the visit  recording.  I have personally performed the services described in this document as transcribed by the above individual, and it is both accurate and complete.

## 2024-01-27 NOTE — PHYSICAL THERAPY INITIAL EVALUATION ADULT - MODALITIES TREATMENT COMMENTS
smile, cheek puff, eyebrow raise: symmetrical. tongue protrusion: midline. visual tracking (H test): smooth pursuit. visual field test (quadrant test): WNL B/L.

## 2024-01-27 NOTE — PHYSICAL THERAPY INITIAL EVALUATION ADULT - PRECAUTIONS/LIMITATIONS, REHAB EVAL
Concentration Of Solution Injected (Mg/Ml): 5.0 Total Volume Injected (Ccs- Only Use Numbers And Decimals): 2cc Include Z78.9 (Other Specified Conditions Influencing Health Status) As An Associated Diagnosis?: No Consent: The risks of atrophy were reviewed with the patient. Detail Level: Detailed X Size Of Lesion In Cm (Optional): 0 Kenalog Preparation: Kenalog Administered By (Optional): Dr Davis Medical Necessity Clause: This procedure was medically necessary because the lesions that were treated were: to prevent progression of disease and symptoms fall precautions

## 2024-01-27 NOTE — PROGRESS NOTE ADULT - SUBJECTIVE AND OBJECTIVE BOX
S/Overnight events:  Pt examined at bedside. C/o mild incisional pain, but otherwise no new complaints. denies h/a, dizziness, vision changes, n/v, numbness/tingling, sob, chest pain.     Hospital Course:   24: POD 0 from L frontal awake crani for tumor resection. Given 1L bolus for soft pressures    Vital Signs Last 24 Hrs  T(C): 36.9 (2024 00:45), Max: 36.9 (2024 18:16)  T(F): 98.5 (2024 00:45), Max: 98.5 (2024 00:45)  HR: 68 (2024 01:00) (55 - 92)  BP: 119/61 (2024 21:00) (74/49 - 119/61)  BP(mean): 80 (2024 21:00) (54 - 80)  RR: 19 (:) (13 - 25)  SpO2: 95% (2024 01:00) (94% - 100%)    Parameters below as of 2024 01:00  Patient On (Oxygen Delivery Method): room air    I&O's Detail    2024 07:  -  2024 01:57  --------------------------------------------------------  IN:    IV PiggyBack: 650 mL    IV PiggyBack: 250 mL    Oral Fluid: 1360 mL    sodium chloride 0.9%: 1050 mL    Sodium Chloride 0.9% Bolus: 1000 mL  Total IN: 4310 mL    OUT:    Voided (mL): 1550 mL  Total OUT: 1550 mL    Total NET: 2760 mL    I&O's Summary    2024 07:  -  2024 01:57  --------------------------------------------------------  IN: 4310 mL / OUT: 1550 mL / NET: 2760 mL    PHYSICAL EXAM:  General: Sitting up in bed, in no acute distress.   HEENT: head wrap in place, c/d/i. Sclera nonicteric.   Pulm: Breathing on RA. Chest rise symmetric.   Cardio: RRR.   GI: nontender, nondistended, soft.   Extremities: nontender. no pitting edema.   Neuro: A&O x3. Conversing appropriately speech with occasional word-finding difficulty, but otherwise no aphasia or confusion, following commands. PERRL, EOMI. Strength: RT UE HG 4+/5, otherwise strength 5/5 throughout. Diminished sensation to light touch throughout RT upper and lower extremities. LT sided sensation intact.     DEVICE/DRAIN DRESSING: head wrap in place, c/d/i    LABS:  PT/INR - ( 2024 12:00 )   PT: 10.4 sec;   INR: 0.91     PTT - ( 2024 12:00 )  PTT:28.7 sec  Urinalysis Basic - ( 2024 22:26 )    Color: x / Appearance: x / SG: x / pH: x  Gluc: 197 mg/dL / Ketone: x  / Bili: x / Urobili: x   Blood: x / Protein: x / Nitrite: x   Leuk Esterase: x / RBC: x / WBC x   Sq Epi: x / Non Sq Epi: x / Bacteria: x          CAPILLARY BLOOD GLUCOSE      POCT Blood Glucose.: 173 mg/dL (2024 21:30)  POCT Blood Glucose.: 214 mg/dL (2024 16:36)      Drug Levels: [] N/A    CSF Analysis: [] N/A      Allergies    Tylox (Other)    Intolerances      MEDICATIONS:  Antibiotics:    Neuro:  acetaminophen     Tablet .. 1000 milliGRAM(s) Oral every 8 hours  levETIRAcetam 500 milliGRAM(s) Oral two times a day  traMADol 50 milliGRAM(s) Oral every 4 hours PRN  traMADol 25 milliGRAM(s) Oral every 4 hours PRN    Anticoagulation:    OTHER:  chlorhexidine 2% Cloths 1 Application(s) Topical <User Schedule>  dexAMETHasone     Tablet 4 milliGRAM(s) Oral every 6 hours  dexAMETHasone     Tablet   Oral   insulin lispro (ADMELOG) corrective regimen sliding scale   SubCutaneous Before meals and at bedtime  pantoprazole    Tablet 40 milliGRAM(s) Oral before breakfast  polyethylene glycol 3350 17 Gram(s) Oral daily  senna 2 Tablet(s) Oral at bedtime    IVF:  sodium chloride 0.9%. 1000 milliLiter(s) IV Continuous <Continuous>    CULTURES:    RADIOLOGY & ADDITIONAL TESTS:    < from: MR Head w/wo IV Cont (24 @ 10:46) >  Since 2023, stable left frontal opercular lesion as described above.    < end of copied text >        ASSESSMENT:  44 year old female, no pertinent PMH, presenting for 6 months of progressive R sided weakness. MRI on 23 showed small area of signal abnormality in L posterior frontal subcortical and deep white matter, and 3.7x1.8 cm arachnoid cyst. Pt continued to experience worsening R sided weakness and seizure like activity, repeat MRIs showing increase in lesion size, now s/p L frontal awake craniotomy for tumor.       C71.9    No pertinent family history in first degree relatives    FH: aneurysm (Mother)    Family history of CVA (Mother)    Handoff    Malignant neoplasm of brain    History of seizure    Brain lesion    Brain tumor    Brain tumor    Brain lesion    History of seizure    Craniotomy with brain mapping    H/O  section    SysAdmin_VstLnk    PLAN:  Neuro:  - neuro/vitals q1h  - pain control with cranial ERAS  - tramadol 25mg for moderate pain, 50mg for severe pain  - h/o seizures: c/w home keppra 500mg BID  - dex 4q6 taper over 1w to off  - pending postop MR  - f/u pathology     CV:  - -140    Resp:  - RA  - IS    GI:  - regular diet  - bowel regimen  - protonix while on steroids    Renal:  - IVF until adequate PO intake  - voiding    Endo:  - ISS, f/u A1c    Heme:  - SCDs for DVT ppx    ID:  - postop ancef    Dispo: NSICU, full code, pending PT/OT  Discussed with Dr. D'Amico, Dr Singh S/Overnight events:  Pt examined at bedside. C/o mild incisional pain, but otherwise no new complaints. denies h/a, dizziness, vision changes, n/v, numbness/tingling, sob, chest pain.     Hospital Course:   24: POD 0 from L frontal awake crani for tumor resection. Given 1L bolus for soft pressures    Vital Signs Last 24 Hrs  T(C): 36.9 (2024 00:45), Max: 36.9 (2024 18:16)  T(F): 98.5 (2024 00:45), Max: 98.5 (2024 00:45)  HR: 68 (2024 01:00) (55 - 92)  BP: 119/61 (2024 21:00) (74/49 - 119/61)  BP(mean): 80 (2024 21:00) (54 - 80)  RR: 19 (:) (13 - 25)  SpO2: 95% (2024 01:00) (94% - 100%)    Parameters below as of 2024 01:00  Patient On (Oxygen Delivery Method): room air    I&O's Detail    2024 07:  -  2024 01:57  --------------------------------------------------------  IN:    IV PiggyBack: 650 mL    IV PiggyBack: 250 mL    Oral Fluid: 1360 mL    sodium chloride 0.9%: 1050 mL    Sodium Chloride 0.9% Bolus: 1000 mL  Total IN: 4310 mL    OUT:    Voided (mL): 1550 mL  Total OUT: 1550 mL    Total NET: 2760 mL    I&O's Summary    2024 07:  -  2024 01:57  --------------------------------------------------------  IN: 4310 mL / OUT: 1550 mL / NET: 2760 mL    PHYSICAL EXAM:  General: Sitting up in bed, in no acute distress.   HEENT: head wrap in place, c/d/i. Sclera nonicteric.   Pulm: Breathing on RA. Chest rise symmetric.   Cardio: RRR.   GI: nontender, nondistended, soft.   Extremities: nontender. no pitting edema.   Neuro: A&O x3. Conversing appropriately speech with occasional word-finding difficulty, but otherwise no aphasia or confusion, following commands. PERRL, EOMI. Strength: RT UE HG 4+/5, otherwise strength 5/5 throughout. Diminished sensation to light touch throughout RT upper and lower extremities. LT sided sensation intact.     DEVICE/DRAIN DRESSING: head wrap in place, c/d/i, SCDs in place b/l.    LABS:  PT/INR - ( 2024 12:00 )   PT: 10.4 sec;   INR: 0.91     PTT - ( 2024 12:00 )  PTT:28.7 sec  Urinalysis Basic - ( 2024 22:26 )    Color: x / Appearance: x / SG: x / pH: x  Gluc: 197 mg/dL / Ketone: x  / Bili: x / Urobili: x   Blood: x / Protein: x / Nitrite: x   Leuk Esterase: x / RBC: x / WBC x   Sq Epi: x / Non Sq Epi: x / Bacteria: x          CAPILLARY BLOOD GLUCOSE      POCT Blood Glucose.: 173 mg/dL (2024 21:30)  POCT Blood Glucose.: 214 mg/dL (2024 16:36)      Drug Levels: [] N/A    CSF Analysis: [] N/A      Allergies    Tylox (Other)    Intolerances      MEDICATIONS:  Antibiotics:    Neuro:  acetaminophen     Tablet .. 1000 milliGRAM(s) Oral every 8 hours  levETIRAcetam 500 milliGRAM(s) Oral two times a day  traMADol 50 milliGRAM(s) Oral every 4 hours PRN  traMADol 25 milliGRAM(s) Oral every 4 hours PRN    Anticoagulation:    OTHER:  chlorhexidine 2% Cloths 1 Application(s) Topical <User Schedule>  dexAMETHasone     Tablet 4 milliGRAM(s) Oral every 6 hours  dexAMETHasone     Tablet   Oral   insulin lispro (ADMELOG) corrective regimen sliding scale   SubCutaneous Before meals and at bedtime  pantoprazole    Tablet 40 milliGRAM(s) Oral before breakfast  polyethylene glycol 3350 17 Gram(s) Oral daily  senna 2 Tablet(s) Oral at bedtime    IVF:  sodium chloride 0.9%. 1000 milliLiter(s) IV Continuous <Continuous>    CULTURES:    RADIOLOGY & ADDITIONAL TESTS:    < from: MR Head w/wo IV Cont (24 @ 10:46) >  Since 2023, stable left frontal opercular lesion as described above.    < end of copied text >        ASSESSMENT:  44 year old female, no pertinent PMH, presenting for 6 months of progressive R sided weakness. MRI on 23 showed small area of signal abnormality in L posterior frontal subcortical and deep white matter, and 3.7x1.8 cm arachnoid cyst. Pt continued to experience worsening R sided weakness and seizure like activity, repeat MRIs showing increase in lesion size, now s/p L frontal awake craniotomy for tumor.       C71.9    No pertinent family history in first degree relatives    FH: aneurysm (Mother)    Family history of CVA (Mother)    Handoff    Malignant neoplasm of brain    History of seizure    Brain lesion    Brain tumor    Brain tumor    Brain lesion    History of seizure    Craniotomy with brain mapping    H/O  section    SysAdmin_VstLnk    PLAN:  Neuro:  - neuro/vitals q1h  - pain control with cranial ERAS  - tramadol 25mg for moderate pain, 50mg for severe pain  - h/o seizures: c/w home keppra 500mg BID  - dex 4q6 taper over 1w to off  - pending postop MR  - f/u pathology     CV:  - -140    Resp:  - RA  - IS    GI:  - regular diet  - bowel regimen  - protonix while on steroids    Renal:  - IVF until adequate PO intake  - voiding    Endo:  - ISS, f/u A1c    Heme:  - SCDs for DVT ppx    ID:  - postop ancef    Dispo: NSICU, full code, pending PT/OT  Discussed with Dr. D'Amico, Dr Singh S/Overnight events:  Pt examined at bedside. C/o mild incisional pain, but otherwise no new complaints. denies h/a, dizziness, vision changes, n/v, numbness/tingling, sob, chest pain.     Hospital Course:   24: POD 0 from L frontal awake crani for tumor resection. Given 1L bolus for soft pressures    Vital Signs Last 24 Hrs  T(C): 36.9 (2024 00:45), Max: 36.9 (2024 18:16)  T(F): 98.5 (2024 00:45), Max: 98.5 (2024 00:45)  HR: 68 (2024 01:00) (55 - 92)  BP: 119/61 (2024 21:00) (74/49 - 119/61)  BP(mean): 80 (2024 21:00) (54 - 80)  RR: 19 (:) (13 - 25)  SpO2: 95% (2024 01:00) (94% - 100%)    Parameters below as of 2024 01:00  Patient On (Oxygen Delivery Method): room air    I&O's Detail    2024 07:  -  2024 01:57  --------------------------------------------------------  IN:    IV PiggyBack: 650 mL    IV PiggyBack: 250 mL    Oral Fluid: 1360 mL    sodium chloride 0.9%: 1050 mL    Sodium Chloride 0.9% Bolus: 1000 mL  Total IN: 4310 mL    OUT:    Voided (mL): 1550 mL  Total OUT: 1550 mL    Total NET: 2760 mL    I&O's Summary    2024 07:  -  2024 01:57  --------------------------------------------------------  IN: 4310 mL / OUT: 1550 mL / NET: 2760 mL    PHYSICAL EXAM:  General: Sitting up in bed, in no acute distress.   HEENT: head wrap in place, c/d/i. Sclera nonicteric.   Pulm: Breathing on RA. Chest rise symmetric.   Cardio: RRR.   GI: nontender, nondistended, soft.   Extremities: nontender. no pitting edema.   Neuro: A&O x3. Conversing appropriately speech with occasional word-finding difficulty, but otherwise no aphasia or confusion, following commands. PERRL, EOMI. CN II-XII grossly intact. Strength: RT UE HG 4+/5, otherwise strength 5/5 throughout. Diminished sensation to light touch throughout RT upper and lower extremities. LT sided sensation intact.     DEVICE/DRAIN DRESSING: head wrap in place, c/d/i, SCDs in place b/l.    LABS:  PT/INR - ( 2024 12:00 )   PT: 10.4 sec;   INR: 0.91     PTT - ( 2024 12:00 )  PTT:28.7 sec  Urinalysis Basic - ( 2024 22:26 )    Color: x / Appearance: x / SG: x / pH: x  Gluc: 197 mg/dL / Ketone: x  / Bili: x / Urobili: x   Blood: x / Protein: x / Nitrite: x   Leuk Esterase: x / RBC: x / WBC x   Sq Epi: x / Non Sq Epi: x / Bacteria: x          CAPILLARY BLOOD GLUCOSE      POCT Blood Glucose.: 173 mg/dL (2024 21:30)  POCT Blood Glucose.: 214 mg/dL (2024 16:36)      Drug Levels: [] N/A    CSF Analysis: [] N/A      Allergies    Tylox (Other)    Intolerances      MEDICATIONS:  Antibiotics:    Neuro:  acetaminophen     Tablet .. 1000 milliGRAM(s) Oral every 8 hours  levETIRAcetam 500 milliGRAM(s) Oral two times a day  traMADol 50 milliGRAM(s) Oral every 4 hours PRN  traMADol 25 milliGRAM(s) Oral every 4 hours PRN    Anticoagulation:    OTHER:  chlorhexidine 2% Cloths 1 Application(s) Topical <User Schedule>  dexAMETHasone     Tablet 4 milliGRAM(s) Oral every 6 hours  dexAMETHasone     Tablet   Oral   insulin lispro (ADMELOG) corrective regimen sliding scale   SubCutaneous Before meals and at bedtime  pantoprazole    Tablet 40 milliGRAM(s) Oral before breakfast  polyethylene glycol 3350 17 Gram(s) Oral daily  senna 2 Tablet(s) Oral at bedtime    IVF:  sodium chloride 0.9%. 1000 milliLiter(s) IV Continuous <Continuous>    CULTURES:    RADIOLOGY & ADDITIONAL TESTS:    < from: MR Head w/wo IV Cont (24 @ 10:46) >  Since 2023, stable left frontal opercular lesion as described above.    < end of copied text >        ASSESSMENT:  44 year old female, no pertinent PMH, presenting for 6 months of progressive R sided weakness. MRI on 23 showed small area of signal abnormality in L posterior frontal subcortical and deep white matter, and 3.7x1.8 cm arachnoid cyst. Pt continued to experience worsening R sided weakness and seizure like activity, repeat MRIs showing increase in lesion size, now s/p L frontal awake craniotomy for tumor.       C71.9    No pertinent family history in first degree relatives    FH: aneurysm (Mother)    Family history of CVA (Mother)    Handoff    Malignant neoplasm of brain    History of seizure    Brain lesion    Brain tumor    Brain tumor    Brain lesion    History of seizure    Craniotomy with brain mapping    H/O  section    SysAdmin_VstLnk    PLAN:  Neuro:  - neuro/vitals q1h  - pain control with cranial ERAS  - tramadol 25mg for moderate pain, 50mg for severe pain  - h/o seizures: c/w home keppra 500mg BID  - dex 4q6 taper over 1w to off  - pending postop MR  - f/u pathology     CV:  - -140    Resp:  - RA  - IS    GI:  - regular diet  - bowel regimen  - protonix while on steroids    Renal:  - IVF until adequate PO intake  - voiding    Endo:  - ISS, f/u A1c    Heme:  - SCDs for DVT ppx    ID:  - postop ancef    Dispo: NSICU, full code, pending PT/OT  Discussed with Dr. D'Amico, Dr Singh S/Overnight events:  Pt examined at bedside. C/o mild incisional pain, but otherwise no new complaints. denies h/a, dizziness, vision changes, n/v, numbness/tingling, sob, chest pain.     Hospital Course:   24: POD 0 from L frontal awake crani for tumor resection. Given 1L bolus for soft pressures    Vital Signs Last 24 Hrs  T(C): 36.9 (2024 00:45), Max: 36.9 (2024 18:16)  T(F): 98.5 (2024 00:45), Max: 98.5 (2024 00:45)  HR: 68 (2024 01:00) (55 - 92)  BP: 119/61 (2024 21:00) (74/49 - 119/61)  BP(mean): 80 (2024 21:00) (54 - 80)  RR: 19 (:) (13 - 25)  SpO2: 95% (2024 01:00) (94% - 100%)    Parameters below as of 2024 01:00  Patient On (Oxygen Delivery Method): room air    I&O's Detail    2024 07:  -  2024 01:57  --------------------------------------------------------  IN:    IV PiggyBack: 650 mL    IV PiggyBack: 250 mL    Oral Fluid: 1360 mL    sodium chloride 0.9%: 1050 mL    Sodium Chloride 0.9% Bolus: 1000 mL  Total IN: 4310 mL    OUT:    Voided (mL): 1550 mL  Total OUT: 1550 mL    Total NET: 2760 mL    I&O's Summary    2024 07:  -  2024 01:57  --------------------------------------------------------  IN: 4310 mL / OUT: 1550 mL / NET: 2760 mL    PHYSICAL EXAM:  General: Sitting up in bed, in no acute distress.   HEENT: head wrap in place, c/d/i. Sclera nonicteric.   Pulm: Breathing on RA. Chest rise symmetric.   Cardio: RRR.   GI: nontender, nondistended, soft.   Extremities: nontender. no pitting edema.   Neuro: A&O x3. Conversing appropriately speech with occasional word-finding difficulty, but otherwise no aphasia or confusion, following commands. PERRL, EOMI. CN II-XII intact. Strength: RT UE HG 4+/5, otherwise strength 5/5 throughout b/l. Diminished sensation to light touch throughout RT upper and lower extremities. LT sided sensation intact.     DEVICE/DRAIN DRESSING: head wrap in place, c/d/i, SCDs in place b/l.    LABS:  PT/INR - ( 2024 12:00 )   PT: 10.4 sec;   INR: 0.91     PTT - ( 2024 12:00 )  PTT:28.7 sec  Urinalysis Basic - ( 2024 22:26 )    Color: x / Appearance: x / SG: x / pH: x  Gluc: 197 mg/dL / Ketone: x  / Bili: x / Urobili: x   Blood: x / Protein: x / Nitrite: x   Leuk Esterase: x / RBC: x / WBC x   Sq Epi: x / Non Sq Epi: x / Bacteria: x          CAPILLARY BLOOD GLUCOSE      POCT Blood Glucose.: 173 mg/dL (2024 21:30)  POCT Blood Glucose.: 214 mg/dL (2024 16:36)      Drug Levels: [] N/A    CSF Analysis: [] N/A      Allergies    Tylox (Other)    Intolerances      MEDICATIONS:  Antibiotics:    Neuro:  acetaminophen     Tablet .. 1000 milliGRAM(s) Oral every 8 hours  levETIRAcetam 500 milliGRAM(s) Oral two times a day  traMADol 50 milliGRAM(s) Oral every 4 hours PRN  traMADol 25 milliGRAM(s) Oral every 4 hours PRN    Anticoagulation:    OTHER:  chlorhexidine 2% Cloths 1 Application(s) Topical <User Schedule>  dexAMETHasone     Tablet 4 milliGRAM(s) Oral every 6 hours  dexAMETHasone     Tablet   Oral   insulin lispro (ADMELOG) corrective regimen sliding scale   SubCutaneous Before meals and at bedtime  pantoprazole    Tablet 40 milliGRAM(s) Oral before breakfast  polyethylene glycol 3350 17 Gram(s) Oral daily  senna 2 Tablet(s) Oral at bedtime    IVF:  sodium chloride 0.9%. 1000 milliLiter(s) IV Continuous <Continuous>    CULTURES:    RADIOLOGY & ADDITIONAL TESTS:    < from: MR Head w/wo IV Cont (24 @ 10:46) >  Since 2023, stable left frontal opercular lesion as described above.    < end of copied text >        ASSESSMENT:  44 year old female, no pertinent PMH, presenting for 6 months of progressive R sided weakness. MRI on 23 showed small area of signal abnormality in L posterior frontal subcortical and deep white matter, and 3.7x1.8 cm arachnoid cyst. Pt continued to experience worsening R sided weakness and seizure like activity, repeat MRIs showing increase in lesion size, now s/p L frontal awake craniotomy for tumor ().       C71.9    No pertinent family history in first degree relatives    FH: aneurysm (Mother)    Family history of CVA (Mother)    Handoff    Malignant neoplasm of brain    History of seizure    Brain lesion    Brain tumor    Brain tumor    Brain lesion    History of seizure    Craniotomy with brain mapping    H/O  section    SysAdmin_VstLnk    PLAN:  Neuro:  - neuro/vitals q1h  - pain control with cranial ERAS  - tramadol 25mg for moderate pain, 50mg for severe pain  - h/o seizures: c/w home keppra 500mg BID  - dex 4q6 taper over 1w to off  - pending postop MR  - f/u pathology     CV:  - -140    Resp:  - RA  - IS    GI:  - regular diet  - bowel regimen  - protonix while on steroids    Renal:  - IVF until adequate PO intake  - voiding    Endo:  - ISS, f/u A1c    Heme:  - SCDs for DVT ppx    ID:  - postop ancef    Dispo: NSICU, full code, pending PT/OT  Discussed with Dr. D'Amico, Dr Gutierrez

## 2024-01-27 NOTE — OCCUPATIONAL THERAPY INITIAL EVALUATION ADULT - DIAGNOSIS, OT EVAL
MRS 1. Patient POD #1 s/p L frontal awake crani for tumor resection, presents with slight dysarthria, however no other neurological or physical deficits impacting independence with functional activities or mobility. Patient demonstrates safe and independent performance of ADL's, and functional mobility with no A and is d/c from skilled OT at this time.

## 2024-01-27 NOTE — PHYSICAL THERAPY INITIAL EVALUATION ADULT - ADDITIONAL COMMENTS
pt states that she lives in a private home w/ her 3 daughters. was independent in ADLs prior to this admission, denies hx of recent falls. Denies use of DME for amb.

## 2024-01-27 NOTE — OCCUPATIONAL THERAPY INITIAL EVALUATION ADULT - GENERAL OBSERVATIONS, REHAB EVAL
PT Olimpia present. Patient received semisupine in bed +tele, +crani dressing C/D/I, +IV, +b/l SCD's, +primafit, room air, c/o of 7/10 incisional pain throughout.

## 2024-01-27 NOTE — OCCUPATIONAL THERAPY INITIAL EVALUATION ADULT - ADDITIONAL COMMENTS
Patient reports living with her 3 daughters in a private home with 8 DANIEL and 14 steps to the bedroom/ bathroom. Patient was independent with all ADL's, IADL's and functional mobility with no AD prior to admission. Patient is R hand dominant and wears glasses for reading and distance. Patient has a bathtub shower at home.

## 2024-01-27 NOTE — PHYSICAL THERAPY INITIAL EVALUATION ADULT - GENERAL OBSERVATIONS, REHAB EVAL
pt received/returned semi-supine in bed +heplock, +tele, +cranial dressing C/D/I, c/o incisional pain

## 2024-01-28 ENCOUNTER — TRANSCRIPTION ENCOUNTER (OUTPATIENT)
Age: 44
End: 2024-01-28

## 2024-01-28 VITALS
TEMPERATURE: 98 F | OXYGEN SATURATION: 94 % | DIASTOLIC BLOOD PRESSURE: 62 MMHG | HEART RATE: 57 BPM | RESPIRATION RATE: 18 BRPM | SYSTOLIC BLOOD PRESSURE: 101 MMHG

## 2024-01-28 LAB
ANION GAP SERPL CALC-SCNC: 9 MMOL/L — SIGNIFICANT CHANGE UP (ref 5–17)
BUN SERPL-MCNC: 13 MG/DL — SIGNIFICANT CHANGE UP (ref 7–23)
CALCIUM SERPL-MCNC: 9 MG/DL — SIGNIFICANT CHANGE UP (ref 8.4–10.5)
CHLORIDE SERPL-SCNC: 108 MMOL/L — SIGNIFICANT CHANGE UP (ref 96–108)
CO2 SERPL-SCNC: 21 MMOL/L — LOW (ref 22–31)
CREAT SERPL-MCNC: 0.73 MG/DL — SIGNIFICANT CHANGE UP (ref 0.5–1.3)
EGFR: 103 ML/MIN/1.73M2 — SIGNIFICANT CHANGE UP
GLUCOSE BLDC GLUCOMTR-MCNC: 151 MG/DL — HIGH (ref 70–99)
GLUCOSE BLDC GLUCOMTR-MCNC: 168 MG/DL — HIGH (ref 70–99)
GLUCOSE SERPL-MCNC: 212 MG/DL — HIGH (ref 70–99)
HCT VFR BLD CALC: 29.7 % — LOW (ref 34.5–45)
HGB BLD-MCNC: 9.6 G/DL — LOW (ref 11.5–15.5)
MAGNESIUM SERPL-MCNC: 2.1 MG/DL — SIGNIFICANT CHANGE UP (ref 1.6–2.6)
MCHC RBC-ENTMCNC: 30.6 PG — SIGNIFICANT CHANGE UP (ref 27–34)
MCHC RBC-ENTMCNC: 32.3 GM/DL — SIGNIFICANT CHANGE UP (ref 32–36)
MCV RBC AUTO: 94.6 FL — SIGNIFICANT CHANGE UP (ref 80–100)
NRBC # BLD: 0 /100 WBCS — SIGNIFICANT CHANGE UP (ref 0–0)
PHOSPHATE SERPL-MCNC: 1.8 MG/DL — LOW (ref 2.5–4.5)
PLATELET # BLD AUTO: 221 K/UL — SIGNIFICANT CHANGE UP (ref 150–400)
POTASSIUM SERPL-MCNC: 4.2 MMOL/L — SIGNIFICANT CHANGE UP (ref 3.5–5.3)
POTASSIUM SERPL-SCNC: 4.2 MMOL/L — SIGNIFICANT CHANGE UP (ref 3.5–5.3)
RBC # BLD: 3.14 M/UL — LOW (ref 3.8–5.2)
RBC # FLD: 12.3 % — SIGNIFICANT CHANGE UP (ref 10.3–14.5)
SODIUM SERPL-SCNC: 138 MMOL/L — SIGNIFICANT CHANGE UP (ref 135–145)
WBC # BLD: 17.13 K/UL — HIGH (ref 3.8–10.5)
WBC # FLD AUTO: 17.13 K/UL — HIGH (ref 3.8–10.5)

## 2024-01-28 PROCEDURE — 97165 OT EVAL LOW COMPLEX 30 MIN: CPT

## 2024-01-28 PROCEDURE — 36415 COLL VENOUS BLD VENIPUNCTURE: CPT

## 2024-01-28 PROCEDURE — 85610 PROTHROMBIN TIME: CPT

## 2024-01-28 PROCEDURE — 99024 POSTOP FOLLOW-UP VISIT: CPT

## 2024-01-28 PROCEDURE — 97162 PT EVAL MOD COMPLEX 30 MIN: CPT

## 2024-01-28 PROCEDURE — 88331 PATH CONSLTJ SURG 1 BLK 1SPC: CPT

## 2024-01-28 PROCEDURE — 82962 GLUCOSE BLOOD TEST: CPT

## 2024-01-28 PROCEDURE — 85027 COMPLETE CBC AUTOMATED: CPT

## 2024-01-28 PROCEDURE — 88307 TISSUE EXAM BY PATHOLOGIST: CPT

## 2024-01-28 PROCEDURE — 88341 IMHCHEM/IMCYTCHM EA ADD ANTB: CPT

## 2024-01-28 PROCEDURE — 70553 MRI BRAIN STEM W/O & W/DYE: CPT

## 2024-01-28 PROCEDURE — 88333 PATH CONSLTJ SURG CYTO XM 1: CPT

## 2024-01-28 PROCEDURE — 85730 THROMBOPLASTIN TIME PARTIAL: CPT

## 2024-01-28 PROCEDURE — 83036 HEMOGLOBIN GLYCOSYLATED A1C: CPT

## 2024-01-28 PROCEDURE — 80048 BASIC METABOLIC PNL TOTAL CA: CPT

## 2024-01-28 PROCEDURE — 85025 COMPLETE CBC W/AUTO DIFF WBC: CPT

## 2024-01-28 PROCEDURE — 84100 ASSAY OF PHOSPHORUS: CPT

## 2024-01-28 PROCEDURE — 86850 RBC ANTIBODY SCREEN: CPT

## 2024-01-28 PROCEDURE — C1713: CPT

## 2024-01-28 PROCEDURE — C1889: CPT

## 2024-01-28 PROCEDURE — 99222 1ST HOSP IP/OBS MODERATE 55: CPT

## 2024-01-28 PROCEDURE — 86901 BLOOD TYPING SEROLOGIC RH(D): CPT

## 2024-01-28 PROCEDURE — 83735 ASSAY OF MAGNESIUM: CPT

## 2024-01-28 PROCEDURE — 88360 TUMOR IMMUNOHISTOCHEM/MANUAL: CPT

## 2024-01-28 PROCEDURE — 86900 BLOOD TYPING SEROLOGIC ABO: CPT

## 2024-01-28 PROCEDURE — 70553 MRI BRAIN STEM W/O & W/DYE: CPT | Mod: 26

## 2024-01-28 RX ORDER — POLYETHYLENE GLYCOL 3350 17 G/17G
17 POWDER, FOR SOLUTION ORAL
Qty: 0 | Refills: 0 | DISCHARGE
Start: 2024-01-28

## 2024-01-28 RX ORDER — PANTOPRAZOLE SODIUM 20 MG/1
1 TABLET, DELAYED RELEASE ORAL
Qty: 7 | Refills: 0
Start: 2024-01-28 | End: 2024-02-03

## 2024-01-28 RX ORDER — HYDROMORPHONE HYDROCHLORIDE 2 MG/ML
4 INJECTION INTRAMUSCULAR; INTRAVENOUS; SUBCUTANEOUS EVERY 4 HOURS
Refills: 0 | Status: DISCONTINUED | OUTPATIENT
Start: 2024-01-28 | End: 2024-01-28

## 2024-01-28 RX ORDER — HYDROMORPHONE HYDROCHLORIDE 2 MG/ML
1 INJECTION INTRAMUSCULAR; INTRAVENOUS; SUBCUTANEOUS
Qty: 12 | Refills: 0
Start: 2024-01-28 | End: 2024-01-30

## 2024-01-28 RX ORDER — OXYCODONE HYDROCHLORIDE 5 MG/1
5 TABLET ORAL EVERY 4 HOURS
Refills: 0 | Status: DISCONTINUED | OUTPATIENT
Start: 2024-01-28 | End: 2024-01-28

## 2024-01-28 RX ORDER — SODIUM,POTASSIUM PHOSPHATES 278-250MG
2 POWDER IN PACKET (EA) ORAL ONCE
Refills: 0 | Status: COMPLETED | OUTPATIENT
Start: 2024-01-28 | End: 2024-01-28

## 2024-01-28 RX ORDER — HYDROMORPHONE HYDROCHLORIDE 2 MG/ML
2 INJECTION INTRAMUSCULAR; INTRAVENOUS; SUBCUTANEOUS EVERY 4 HOURS
Refills: 0 | Status: DISCONTINUED | OUTPATIENT
Start: 2024-01-28 | End: 2024-01-28

## 2024-01-28 RX ORDER — OXYCODONE HYDROCHLORIDE 5 MG/1
1 TABLET ORAL
Qty: 12 | Refills: 0
Start: 2024-01-28 | End: 2024-01-30

## 2024-01-28 RX ORDER — OXYCODONE AND ACETAMINOPHEN 5; 325 MG/1; MG/1
1 TABLET ORAL
Qty: 12 | Refills: 0
Start: 2024-01-28 | End: 2024-01-30

## 2024-01-28 RX ORDER — DEXAMETHASONE 0.5 MG/5ML
2 ELIXIR ORAL
Qty: 20 | Refills: 0
Start: 2024-01-28 | End: 2024-02-01

## 2024-01-28 RX ADMIN — HYDROMORPHONE HYDROCHLORIDE 4 MILLIGRAM(S): 2 INJECTION INTRAMUSCULAR; INTRAVENOUS; SUBCUTANEOUS at 10:17

## 2024-01-28 RX ADMIN — PANTOPRAZOLE SODIUM 40 MILLIGRAM(S): 20 TABLET, DELAYED RELEASE ORAL at 06:10

## 2024-01-28 RX ADMIN — Medication 2 PACKET(S): at 10:12

## 2024-01-28 RX ADMIN — Medication 4 MILLIGRAM(S): at 02:17

## 2024-01-28 RX ADMIN — Medication 1000 MILLIGRAM(S): at 02:18

## 2024-01-28 RX ADMIN — LEVETIRACETAM 500 MILLIGRAM(S): 250 TABLET, FILM COATED ORAL at 06:10

## 2024-01-28 RX ADMIN — Medication 1000 MILLIGRAM(S): at 06:09

## 2024-01-28 RX ADMIN — Medication 1000 MILLIGRAM(S): at 07:23

## 2024-01-28 RX ADMIN — HYDROMORPHONE HYDROCHLORIDE 2 MILLIGRAM(S): 2 INJECTION INTRAMUSCULAR; INTRAVENOUS; SUBCUTANEOUS at 04:59

## 2024-01-28 RX ADMIN — Medication 2: at 07:27

## 2024-01-28 RX ADMIN — Medication 4 MILLIGRAM(S): at 15:15

## 2024-01-28 RX ADMIN — HYDROMORPHONE HYDROCHLORIDE 4 MILLIGRAM(S): 2 INJECTION INTRAMUSCULAR; INTRAVENOUS; SUBCUTANEOUS at 09:36

## 2024-01-28 RX ADMIN — HYDROMORPHONE HYDROCHLORIDE 2 MILLIGRAM(S): 2 INJECTION INTRAMUSCULAR; INTRAVENOUS; SUBCUTANEOUS at 03:59

## 2024-01-28 RX ADMIN — Medication 1000 MILLIGRAM(S): at 14:23

## 2024-01-28 RX ADMIN — Medication 4 MILLIGRAM(S): at 09:36

## 2024-01-28 NOTE — DISCHARGE NOTE PROVIDER - NSDCMRMEDTOKEN_GEN_ALL_CORE_FT
dexAMETHasone 2 mg oral tablet: 2 tab(s) orally 3 times a day Please take 2 tablets 3x a day (1/28- 1/29)   Please take 1 tablet 3x a day (1/30- 1/31)   Please take 1 tablet 2x a day (2/1)  Dilaudid 2 mg oral tablet: 1 tab(s) orally every 6 hours as needed for  severe pain MDD: 4 tablets  levETIRAcetam 500 mg oral tablet: 1 tab(s) orally 2 times a day  pantoprazole 40 mg oral delayed release tablet: 1 tab(s) orally once a day (before a meal)  polyethylene glycol 3350 oral powder for reconstitution: 17 gram(s) orally once a day  Tylenol 325 mg oral capsule: 1 cap(s) orally prn   dexAMETHasone 2 mg oral tablet: 2 tab(s) orally 3 times a day Please take 2 tablets 3x a day (1/28- 1/29)   Please take 1 tablet 3x a day (1/30- 1/31)   Please take 1 tablet 2x a day (2/1)  Dilaudid 2 mg oral tablet: 1 tab(s) orally every 6 hours as needed for  severe pain MDD: 4 tablets  levETIRAcetam 500 mg oral tablet: 1 tab(s) orally 2 times a day  oxyCODONE 5 mg oral tablet: 1 tab(s) orally every 6 hours as needed for  severe pain MDD: 4 tablets per day  pantoprazole 40 mg oral delayed release tablet: 1 tab(s) orally once a day (before a meal)  polyethylene glycol 3350 oral powder for reconstitution: 17 gram(s) orally once a day  Tylenol 325 mg oral capsule: 1 cap(s) orally prn

## 2024-01-28 NOTE — CONSULT NOTE ADULT - SUBJECTIVE AND OBJECTIVE BOX
Pt seen and evaluated at bedside. In summary the pt is a 43 yo f w/ pmhx of seizure  p/w  6 months of progressive R sided weakness. The pt had an MRI on 23 that  showed small area of signal abnormality in L posterior frontal subcortical and deep white matter, and 3.7x1.8 cm arachnoid cyst. Pt continued to experience worsening R sided weakness and seizure like activity. The pt had repeat MRIs showing increase in lesion size. Pt is now  s/p L frontal awake crani for tumor resection.     PAST MEDICAL & SURGICAL HISTORY:  History of seizure  Brain lesion  Hx of   section      Home Medications:  levETIRAcetam 500 mg oral tablet: 1 tab(s) orally 2 times a day (2024 08:54)  polyethylene glycol 3350 oral powder for reconstitution: 17 gram(s) orally once a day (2024 09:43)  Tylenol 325 mg oral capsule: 1 cap(s) orally prn (2024 09:24)      Allergies;Tylox (Other)      FAMILY HISTORY:  FH: aneurysm (Mother)    Family history of CVA (Mother)    Social History:  Current daily smoker (vaping). No ETOH, recreational drug use. (2024 06:36)      VITAL SIGNS, INS/OUTS (last 24 hours):  Vital Signs Last 24 Hrs  T(C): 36.8 (2024 08:42), Max: 37.3 (2024 15:43)  T(F): 98.2 (2024 08:42), Max: 99.1 (2024 15:43)  HR: 57 (2024 08:42) (52 - 75)  BP: 101/62 (2024 08:42) (92/51 - 109/67)  BP(mean): 75 (2024 14:00) (75 - 75)  RR: 18 (2024 08:42) (15 - 22)  SpO2: 94% (2024 08:42) (94% - 98%)    Parameters below as of 2024 08:42  Patient On (Oxygen Delivery Method): room air    PHYSICAL EXAM:  NAD laying in bed  CTA b/l no wheezing or crackles  NL S1,S2 no mumurs   soft NT/ND + BS no rebound or guarding   Neuro : AAOx3; sensation intact; strength 5/5     BASIC LABS:                        9.6    17.13 )-----------( 221      ( 2024 05:30 )             29.7         138  |  108  |  13  ----------------------------<  212<H>  4.2   |  21<L>  |  0.73    Ca    9.0      2024 05:30  Phos  1.8       Mg     2.1           CAPILLARY BLOOD GLUCOSE  POCT Blood Glucose.: 168 mg/dL (2024 11:36)  POCT Blood Glucose.: 151 mg/dL (2024 07:17)  POCT Blood Glucose.: 164 mg/dL (2024 21:26)  POCT Blood Glucose.: 153 mg/dL (2024 17:03)    CURRENT MEDICATIONS:   acetaminophen     Tablet .. 1000 milliGRAM(s) Oral every 8 hours  dexAMETHasone     Tablet 4 milliGRAM(s) Oral every 6 hours  dexAMETHasone     Tablet   Oral   enoxaparin Injectable 40 milliGRAM(s) SubCutaneous <User Schedule>  HYDROmorphone   Tablet 2 milliGRAM(s) Oral every 4 hours PRN  HYDROmorphone   Tablet 4 milliGRAM(s) Oral every 4 hours PRN  levETIRAcetam 500 milliGRAM(s) Oral two times a day  pantoprazole    Tablet 40 milliGRAM(s) Oral before breakfast  polyethylene glycol 3350 17 Gram(s) Oral daily  senna 2 Tablet(s) Oral at bedtime

## 2024-01-28 NOTE — PROGRESS NOTE ADULT - REASON FOR ADMISSION
L frontal awake crani for tumor resection

## 2024-01-28 NOTE — CONSULT NOTE ADULT - ASSESSMENT
45 yo f w/ pmhx of seizure  p/w  6 months of progressive R sided weakness. The pt had an MRI on 1/20/23 that  showed small area of signal abnormality in L posterior frontal subcortical and deep white matter, and 3.7x1.8 cm arachnoid cyst. Pt continued to experience worsening R sided weakness and seizure like activity. The pt had repeat MRIs showing increase in lesion size. Pt is now  s/p L frontal awake crani for tumor resection.     #s/p L frontal crani for tumor resection  #Postoperative state   -Pain and bowel regime management as per Neurosurgery team   - Continue decadron and PPI while on steroids  -Will f/u postop MRI  -Pt was seen by PT and deemed not to have any PT needs     #Seizure  -Seizure precautions   - Continue Levetiracetam 500mg PO BID    #DVT prop  -Continue Lovenox 40mg SQ daily    #DISPO  -As per primary team

## 2024-01-28 NOTE — DISCHARGE NOTE PROVIDER - HOSPITAL COURSE
HPI: 44 year old female, no pertinent PMH, presenting for 6 months of progressive R sided weakness. Her symptoms began as intermittent R fingertip numbness for which she went to [facility]. MRI on 1/20/23 showed small area of signal abnormality in L posterior frontal subcortical and deep white matter, and 3.7x1.8 cm arachnoid cyst. Numbness and weakness continued to progress to entire R arm and R leg occurring several times daily and an aura sensation in her L ear. MRI on 5/30/23 showed progression leading to referral to [facility] Neurosurgery and neurology. Started on keppra 250 BID by Dr. Doty to r/o seizure, states no improvement of symptoms. Patient continued to endorse worsening seizure like activity, including a "throat closing" sensation up to 5 times daily, MRI 12/20/23 showed increase in size of lesion. MRI with quicktome completed 1/17/24, plan for L frontal awake crani for resection on 1/26/24.     Hospital Course:  1/26/24: POD 0 from L frontal awake crani for tumor resection. Given 1L bolus for soft pressures  1/27: POD 1. HAI overnight. SQL tonight. SD status.   1/28: POD 2. HAI overnight, neuro stable. Tramadol changed to dilaudid 2/4 q4h prn.     Patient evaluated by PT/OT who recommended: Home no needs    Hospital course c/b: Uncomplicated     Exam on day of discharge:  Constitutional: awake, alert, sitting up in bed. NAD.   Respiratory: non-labored breathing. Normal chest rise.   Cardiovascular: Regular rate and rhythm  Gastrointestinal:  Soft, nontender, nondistended.  .  Vascular: Extremities warm, no ulcers, no discoloration of skin.   Neurological: Gen: AA&O x 3, conversant, appropriate.      CN II-XII grossly intact.    Motor: GRAY x 4, 5/5 throughout UE/LE.    Sens: Sensation intact to light touch throughout.    Extremities: warm and well perfused     No pronator drift  Wound/incision: L crani incision c/d/i open to air    Vitals stables, neuro intact, post op imaging complete, pain controlled, follow up appointment obtained please call office to confirm

## 2024-01-28 NOTE — DISCHARGE NOTE PROVIDER - CARE PROVIDERS DIRECT ADDRESSES
[email or web address]

## 2024-01-28 NOTE — DISCHARGE NOTE PROVIDER - CARE PROVIDER_API CALL
D'Amico, Randy Scott  Neurosurgery  [address] [address]  Phone: [phone]  Fax: [phone]  Follow Up Time:

## 2024-01-28 NOTE — DISCHARGE NOTE NURSING/CASE MANAGEMENT/SOCIAL WORK - PATIENT PORTAL LINK FT
You can access the FollowMyHealth Patient Portal offered by [facility] by registering at the following website: [email or web address]. By joining [facility] [employer]’s FollowMyHealth portal, you will also be able to view your health information using other applications (apps) compatible with our system.

## 2024-01-28 NOTE — PROGRESS NOTE ADULT - SUBJECTIVE AND OBJECTIVE BOX
HPI:  44 year old female, no pertinent PMH, presenting for 6 months of progressive R sided weakness. Her symptoms began as intermittent R fingertip numbness for which she went to [place]. MRI on 23 showed small area of signal abnormality in L posterior frontal subcortical and deep white matter, and 3.7x1.8 cm arachnoid cyst. Numbness and weakness continued to progress to entire R arm and R leg occurring several times daily and an aura sensation in her L ear. MRI on 23 showed progression leading to referral to [facility] Neurosurgery and neurology. Started on keppra 250 BID by Dr. Doty to r/o seizure, states no improvement of symptoms. Patient continued to endorse worsening seizure like activity, including a "throat closing" sensation up to 5 times daily, MRI 23 showed increase in size of lesion. MRI with quicktome completed 24, plan for L frontal awake crani for resection on 24.  (2024 06:36)    OVERNIGHT EVENTS: HAI overnight, neuro stable.     Hospital Course:   24: POD 0 from L frontal awake crani for tumor resection. Given 1L bolus for soft pressures  : POD 1. HAI overnight. SQL tonight. SD status.   : POD 2. AHI overnight, neuro stable. Tramadol changed to oxy 5 q4h prn for severe pain.     Vital Signs Last 24 Hrs  T(C): 36.6 (2024 00:17), Max: 37.3 (2024 15:43)  T(F): 97.8 (2024 00:17), Max: 99.1 (2024 15:43)  HR: 52 (2024 00:17) (48 - 75)  BP: 93/55 (2024 00:17) (92/51 - 110/61)  BP(mean): 75 (2024 14:00) (68 - 78)  RR: 18 (2024 00:17) (12 - 51)  SpO2: 97% (2024 00:17) (94% - 100%)    Parameters below as of 2024 00:17  Patient On (Oxygen Delivery Method): room air        I&O's Summary    2024 07: 07:00  --------------------------------------------------------  IN: 5260 mL / OUT: 2750 mL / NET: 2510 mL    2024 07:01  -  2024 00:37  --------------------------------------------------------  IN: 990 mL / OUT: 375 mL / NET: 615 mL          PHYSICAL EXAM:  Constitutional: awake, alert, sitting up in bed. NAD.   Respiratory: non-labored breathing. Normal chest rise.   Cardiovascular: Regular rate and rhythm  Gastrointestinal:  Soft, nontender, nondistended.  .  Vascular: Extremities warm, no ulcers, no discoloration of skin.   Neurological: Gen: AA&O x 3, conversant, appropriate.      CN II-XII grossly intact.    Motor: GRAY x 4, 5/5 throughout UE/LE.    Sens: Sensation intact to light touch throughout.    Extremities: warm and well perfused     No pronator drift  Wound/incision: L crani incision c/d/i with headwrap in place     TUBES/LINES:  [] [place]  [] Lumbar Drain  [] Wound Drains  [] Others      DIET:  [] NPO  [x] Mechanical  [] Tube feeds    LABS:                        10.3   20.11 )-----------( 216      ( 2024 08:58 )             31.5         141  |  110<H>  |  10  ----------------------------<  155<H>  4.1   |  21<L>  |  0.67    Ca    8.3<L>      2024 05:30  Phos  2.7       Mg     2.0           PT/INR - ( 2024 12:00 )   PT: 10.4 sec;   INR: 0.91          PTT - ( 2024 12:00 )  PTT:28.7 sec  Urinalysis Basic - ( 2024 05:30 )    Color: x / Appearance: x / SG: x / pH: x  Gluc: 155 mg/dL / Ketone: x  / Bili: x / Urobili: x   Blood: x / Protein: x / Nitrite: x   Leuk Esterase: x / RBC: x / WBC x   Sq Epi: x / Non Sq Epi: x / Bacteria: x          CAPILLARY BLOOD GLUCOSE      POCT Blood Glucose.: 164 mg/dL (2024 21:26)  POCT Blood Glucose.: 153 mg/dL (2024 17:03)  POCT Blood Glucose.: 188 mg/dL (2024 11:14)  POCT Blood Glucose.: 148 mg/dL (2024 06:11)      Drug Levels: [] N/A    CSF Analysis: [] N/A      Allergies    Tylox (Other)    Intolerances      MEDICATIONS:  Antibiotics:    Neuro:  acetaminophen     Tablet .. 1000 milliGRAM(s) Oral every 8 hours  levETIRAcetam 500 milliGRAM(s) Oral two times a day  oxyCODONE    IR 5 milliGRAM(s) Oral every 4 hours PRN    Anticoagulation:  enoxaparin Injectable 40 milliGRAM(s) SubCutaneous <User Schedule>    OTHER:  dexAMETHasone     Tablet 4 milliGRAM(s) Oral every 6 hours  dexAMETHasone     Tablet   Oral   insulin lispro (ADMELOG) corrective regimen sliding scale   SubCutaneous Before meals and at bedtime  pantoprazole    Tablet 40 milliGRAM(s) Oral before breakfast  polyethylene glycol 3350 17 Gram(s) Oral daily  senna 2 Tablet(s) Oral at bedtime    IVF:    CULTURES:    RADIOLOGY & ADDITIONAL TESTS:      ASSESSMENT:  44 year old female, no pertinent PMH, presenting for 6 months of progressive R sided weakness. MRI on 23 showed small area of signal abnormality in L posterior frontal subcortical and deep white matter, and 3.7x1.8 cm arachnoid cyst. Pt continued to experience worsening R sided weakness and seizure like activity, repeat MRIs showing increase in lesion size. S/p L frontal awake crani for tumor resection.       C71.9    No pertinent family history in first degree relatives    FH: aneurysm (Mother)    Family history of CVA (Mother)    Handoff    MEWS Score    Malignant neoplasm of brain    History of seizure    Brain lesion    Brain tumor    Brain tumor    Brain lesion    History of seizure    Craniotomy with brain mapping    H/O  section    SysAdmin_VstLnk        PLAN:    Neuro:  - neuro/vitals q4h  - pain control with cranial ERAS  - h/o seizures: c/w home keppra 500mg BID  - dex 4q6 taper over 1w to off  - pending postop MR    CV:  - -140    Resp:  - RA  - IS    GI:  - regular diet  - bowel regimen  - protonix while on steroids    Renal:  - IVF until adequate PO intake  - voiding    Endo:  - ISS  - A1c: 5.3    Heme:  - SCDs, SQL for DVT ppx    ID:  - postop ancef  - Trend leukocytosis     Dispo: NSICU, full code, pending PT/OT  Discussed with Dr. D'Amico, Dr Singh    Encephalopathy: [] Metabolic, [] Hepatic, [] toxic, [] Neurological, [] Other    Abnormal Nurtitional Status: [] malnurtition (see nutrition note), [ ]underweight: BMI < 19, [] morbid obesity: BMI >40, [] Cachexia    [] Sepsis  [] hypovolemic shock,[] cardiogenic shock, [] hemorrhagic shock, [] neuogenic shock  [] Acute Respiratory Failure  []Cerebral edema, [] Brain compression/ herniation,   [] Functional quadriplegia  [] Acute blood loss anemia

## 2024-01-28 NOTE — DISCHARGE NOTE PROVIDER - NSDCCPCAREPLAN_GEN_ALL_CORE_FT
PRINCIPAL DISCHARGE DIAGNOSIS  Diagnosis: Brain lesion  Assessment and Plan of Treatment:       SECONDARY DISCHARGE DIAGNOSES  Diagnosis: History of seizure  Assessment and Plan of Treatment:

## 2024-01-28 NOTE — DISCHARGE NOTE PROVIDER - NSDCFUADDINST_GEN_ALL_CORE_FT
Neurosurgery follow up appointment date/time:  - 3 staples are in place will be removed at outpatient appointment  - please call the office to confirm appointment: [phone]    Wound Care:  - You have staples and absorbable sutures.   - Patient can shower with soapy water, pat incision to dry  - keep incision clean and dry    Activity:  - fatigue is common after surgery, rest if you feel tired   - no bending, lifting, twisting or heavy lifting   - walking is recommended, ambulate as tolerated  - you may shower when you get home, keep your incision dry  - no bathing   - no driving within 24 hours of anesthesia or while taking prescription pain medications   - keep hydrated, drink plenty of water     Please also follow up with your primary care doctor.     Pain Expectations:  - pain after surgery is expected  - please take pain meds as prescribed     Medications:  Continue taking Keppra 500 mg 2x a day.   - new meds:   Continue to take steroids as prescribed- taper to off, and while on steroids continue to take Protonix.   - pain meds:   Continue to take 1-2 tablets of tylenol as needed for mild to moderate pain every 6 hours, for severe pain can take 2 mg of dilaudid every 6 hours as needed  - pain medications can cause constipation, you should eat a high fiber diet and may take a stool softener while on pain meds   - Avoid taking Advil (ibuprofen), Motrin (naproxen), or Aspirin for pain as they can cause bleeding     Call the office or come to ED if:  - wound has drainage or bleeding, increased redness or pain at incision site, neurological change, fever (>101), chills, night sweats, syncope, nausea/vomiting      Playback:  - discharge instructions are on playback    WITHIN 24 HOURS OF DISCHARGE, PLEASE CONTACT NEURO PA  WITH ANY QUESTIONS OR CONCERNS: [phone]   OTHERWISE, PLEASE CALL THE OFFICE WITH ANY QUESTIONS OR CONCERNS: [phone]

## 2024-01-29 PROBLEM — G93.9 DISORDER OF BRAIN, UNSPECIFIED: Chronic | Status: ACTIVE | Noted: 2024-01-26

## 2024-01-29 PROBLEM — Z87.898 PERSONAL HISTORY OF OTHER SPECIFIED CONDITIONS: Chronic | Status: ACTIVE | Noted: 2024-01-25

## 2024-01-29 NOTE — CHART NOTE - NSCHARTNOTEFT_GEN_A_CORE
Please note this patient was found to have hypophosphatemia on daily lab work which was repleted with potassium phosphate and sodium phosphate during the patient's hospital admission. She was asymptomatic from the hypophosphatemia.

## 2024-01-30 ENCOUNTER — APPOINTMENT (OUTPATIENT)
Dept: NEUROSURGERY | Facility: CLINIC | Age: 44
End: 2024-01-30
Payer: MEDICAID

## 2024-01-30 PROCEDURE — 99024 POSTOP FOLLOW-UP VISIT: CPT

## 2024-01-30 RX ORDER — METHOCARBAMOL 500 MG/1
500 TABLET, FILM COATED ORAL EVERY 8 HOURS
Qty: 42 | Refills: 0 | Status: ACTIVE | COMMUNITY
Start: 2024-01-30 | End: 1900-01-01

## 2024-01-30 NOTE — REVIEW OF SYSTEMS
[Fever] : no fever [Chills] : no chills [Chest Pain] : no chest pain [Palpitations] : no palpitations [Shortness Of Breath] : no shortness of breath [SOB on Exertion] : no shortness of breath during exertion [As Noted in HPI] : as noted in HPI

## 2024-01-30 NOTE — ASSESSMENT
[FreeTextEntry1] : 45 y/o right- handed female, former smoker, with no significant PMHx, found to have left frontal motor strip cystic lesion on MRI 1/20/2023 during workup for progressive right fingertip numbness for which went to [place] and MRI showed small area of signal abnormality in L posterior frontal subcortical and deep white matter, and 3.7x1.8 cm arachnoid cyst. Numbness and weakness continued to progress to entire Right arm and Right leg occurring several times daily and an aura sensation in her Left ear. MRI on 5/30/23 showed progression leading to referral to [facility] Neurosurgery and neurology. Started on keppra BID by Dr. Doty to r/o seizure, no improvement of symptoms. MS workup negative. Serial imaging completed which showed increase in size of lesion. Also with progression of symptoms to include right arm weakness, right leg intermittent weakness, right tongue deviation.  Now s/p left sided awake craniotomy for resection of brain tumor 1/26/24 with Dr. D'Amico. Path pending.   She is overall recovering as expected. Notes feelings of fatigue that are improving. Also with left sided headaches that are improving, currently on tylenol prn and percocet at night. However, noted to have muscle soreness towards left side of her head going towards her cheek that is unrelieved by percocet.   Will send methocarbamol prn.  Educated to continue to shower daily. Wash wound with mild or baby shampoo. Pat incision dry with separate dry towel. Report all S/S infection including drainage, redness, warmth to touch of incision, chills.  Educated no bending, lifting, or strenuous activities for 6 weeks. Discussed walking is recommended as tolerated.   She is scheduled for in person post op visit next week.  Should notify if any worsening symptoms, unrelieved pain, any new/worsening symptoms in the interim.   Patient verbalizes understanding of todays discussion and next steps in treatment plan.

## 2024-01-30 NOTE — REASON FOR VISIT
[Home] : at home, [unfilled] , at the time of the visit. [Medical Office: ([facility])___] : at the medical office located in  [Verbal consent obtained from patient] : the patient, [unfilled] [de-identified] : left sided awake craniotomy for resection of brain tumor  [de-identified] : 1/26/24 [id number] Exp Problem Focused - Mod. Complex

## 2024-01-30 NOTE — HISTORY OF PRESENT ILLNESS
[FreeTextEntry1] : 45 y/o right- handed female, former smoker, with no significant PMHx, found to have left frontal motor strip cystic lesion on MRI 1/20/2023 during workup for progressive right fingertip numbness for which went to [place] [facility] and MRI showed small area of signal abnormality in L posterior frontal subcortical and deep white matter, and 3.7x1.8 cm arachnoid cyst. Numbness and weakness continued to progress to entire Right arm and Right leg occurring several times daily and an aura sensation in her Left ear. MRI on 5/30/23 showed progression leading to referral to [facility] Neurosurgery and neurology. Started on keppra BID by Dr. Doty to r/o seizure, no improvement of symptoms. MS workup negative. Serial imaging completed which showed increase in size of lesion. Also with progression of symptoms to include right arm weakness, right leg intermittent weakness, right tongue deviation.  Now s/p left sided awake craniotomy for resection of brain tumor 1/26/24 with Dr. D'Amico.  PATH: pending   1/28/24 dc home   Returns TODAY for post op follow- up via telephone visit.  She endorses overall feeling fatigued and tired but improving. Also with left sided headaches that are improving. Currently taking tylenol and percocet at bedtime.  Notes muscle soreness towards left side of her head going towards her cheek that is unrelieved by percocet.  Continues on keppra 500 mg BID. Notes right sided seizure like activities have lessened since surgery. About 1/day the past two days.  Has been washing incision daily since dc home. Denies signs of any postop wound infection which could include but not limited to redness, swelling, purulent drainage.  Continuing dex taper.   Neurologist: Sobia Doty

## 2024-01-30 NOTE — DATA REVIEWED
[de-identified] :    ACC: [id number] EXAM: MR BRAIN WAW IC ORDERED BY: JACKIE TRINIDAD  PROCEDURE DATE: 01/28/2024    INTERPRETATION: Contrast-enhanced MRI of the brain.  CLINICAL INDICATION: Status post craniotomy for left frontal opercular lesion resection  TECHNIQUE: Multiplanar, multisequence MR images of the brain were obtained before and after the intravenous menstruation of 7 cc of Gadavist. 3 cc discarded.  COMPARISON: MRI brain 1/17/2024  FINDINGS:  Interval left parietotemporal craniotomy.  Interval subtotal resection of previously seen left frontal opercular T1 hyperintense lesion.  Residual 0.8 x 0.9 cm focus of T1 shortening in the left frontal opercular region. The presence of T1 shortening limits evaluation for underlying enhancement.  No abnormal parenchymal or leptomeningeal enhancement elsewhere in the brain.  1.2 x 0.7 cm focus of postsurgical hemorrhage lateral to the lesion and subjacent to the craniotomy.  Residual nonspecific T2 and FLAIR hyperintense signal surrounding the surgical bed and within the left corona radiata/posterior limb of the left internal capsule.  No hydrocephalus, midline shift, or effacement of the basal cisterns.  No acute territorial infarct.  Redemonstration of right anterior temporal arachnoid cyst.  Signal voids are seen within the major intracranial vessels consistent with their patency.  The visualized paranasal sinuses and mastoid air cells are clear.  The orbits, sellar and suprasellar structures, and craniocervical junction are unremarkable.  IMPRESSION:  Interval left parietotemporal craniotomy.  Interval subtotal resection of previously seen left frontal opercular T1 hyperintense lesion.  Residual 0.8 x 0.9 cm focus of T1 shortening in the left frontal opercular region. The presence of T1 shortening limits evaluation for underlying enhancement.  No abnormal parenchymal or leptomeningeal enhancement elsewhere in the brain.  1.2 x 0.7 cm focus of postsurgical hemorrhage lateral to the lesion and subjacent to the craniotomy.  Residual nonspecific T2 and FLAIR hyperintense signal surrounding the surgical bed and within the left corona radiata/posterior limb of the left internal capsule.  Dr. Leigh discussed these findings with neurosurgical physician's assistant Lilian on 1/29/2024 9:55 AM with read back.     --- End of Report ---

## 2024-02-02 PROBLEM — Z09 POSTOP CHECK: Status: ACTIVE | Noted: 2024-01-30

## 2024-02-07 LAB — SURGICAL PATHOLOGY STUDY: SIGNIFICANT CHANGE UP

## 2024-02-08 ENCOUNTER — APPOINTMENT (OUTPATIENT)
Dept: NEUROSURGERY | Facility: CLINIC | Age: 44
End: 2024-02-08
Payer: MEDICAID

## 2024-02-08 VITALS
DIASTOLIC BLOOD PRESSURE: 78 MMHG | WEIGHT: 155 LBS | RESPIRATION RATE: 18 BRPM | TEMPERATURE: 98 F | BODY MASS INDEX: 29.27 KG/M2 | HEART RATE: 80 BPM | OXYGEN SATURATION: 98 % | HEIGHT: 61 IN | SYSTOLIC BLOOD PRESSURE: 129 MMHG

## 2024-02-08 DIAGNOSIS — R06.02 SHORTNESS OF BREATH: ICD-10-CM

## 2024-02-08 DIAGNOSIS — Z09 ENCOUNTER FOR FOLLOW-UP EXAMINATION AFTER COMPLETED TREATMENT FOR CONDITIONS OTHER THAN MALIGNANT NEOPLASM: ICD-10-CM

## 2024-02-08 PROCEDURE — 99024 POSTOP FOLLOW-UP VISIT: CPT

## 2024-02-08 NOTE — HISTORY OF PRESENT ILLNESS
[FreeTextEntry1] : 43 y/o right- handed female, former smoker, with no significant PMHx, found to have left frontal motor strip cystic lesion on MRI 1/20/2023 during workup for progressive right fingertip numbness for which went to [place] [facility] and MRI showed small area of signal abnormality in L posterior frontal subcortical and deep white matter, and 3.7x1.8 cm arachnoid cyst. Numbness and weakness continued to progress to entire Right arm and Right leg occurring several times daily and an aura sensation in her Left ear. MRI on 5/30/23 showed progression leading to referral to [facility] Neurosurgery and neurology. Started on keppra BID by Dr. Doty to r/o seizure, no improvement of symptoms. MS workup negative. Serial imaging completed which showed increase in size of lesion. Also with progression of symptoms to include right arm weakness, right leg intermittent weakness, right tongue deviation. Now s/p left sided awake craniotomy for resection of brain tumor 1/26/24. PATH: pending, was sent to [facility] 1/28/24 dc home  Returns TODAY for post op follow- up. She states overall recovering well. Speech issues with some improvement. Continues to have some paraphasic errors.  Right sided numbness sensations and weakness improved immediate post op and since returned to how it was pre- op. Also with intermittent headaches that are improving.  Notes shortness of breath with exertion. Denies chest pain.  Denies signs of any postop wound infection which could include but not limited to redness, swelling, purulent drainage.  Neurologist: Sobia Doty

## 2024-02-08 NOTE — DATA REVIEWED
[de-identified] : ACC: [id number] EXAM: MR BRAIN WAW IC ORDERED BY: JACKIE TRINIDAD  PROCEDURE DATE: 01/28/2024    INTERPRETATION: Contrast-enhanced MRI of the brain.  CLINICAL INDICATION: Status post craniotomy for left frontal opercular lesion resection  TECHNIQUE: Multiplanar, multisequence MR images of the brain were obtained before and after the intravenous menstruation of 7 cc of Gadavist. 3 cc discarded.  COMPARISON: MRI brain 1/17/2024  FINDINGS:  Interval left parietotemporal craniotomy.  Interval subtotal resection of previously seen left frontal opercular T1 hyperintense lesion.  Residual 0.8 x 0.9 cm focus of T1 shortening in the left frontal opercular region. The presence of T1 shortening limits evaluation for underlying enhancement.  No abnormal parenchymal or leptomeningeal enhancement elsewhere in the brain.  1.2 x 0.7 cm focus of postsurgical hemorrhage lateral to the lesion and subjacent to the craniotomy.  Residual nonspecific T2 and FLAIR hyperintense signal surrounding the surgical bed and within the left corona radiata/posterior limb of the left internal capsule.  No hydrocephalus, midline shift, or effacement of the basal cisterns.  No acute territorial infarct.  Redemonstration of right anterior temporal arachnoid cyst.  Signal voids are seen within the major intracranial vessels consistent with their patency.  The visualized paranasal sinuses and mastoid air cells are clear.  The orbits, sellar and suprasellar structures, and craniocervical junction are unremarkable.  IMPRESSION:  Interval left parietotemporal craniotomy.  Interval subtotal resection of previously seen left frontal opercular T1 hyperintense lesion.  Residual 0.8 x 0.9 cm focus of T1 shortening in the left frontal opercular region. The presence of T1 shortening limits evaluation for underlying enhancement.  No abnormal parenchymal or leptomeningeal enhancement elsewhere in the brain.  1.2 x 0.7 cm focus of postsurgical hemorrhage lateral to the lesion and subjacent to the craniotomy.  Residual nonspecific T2 and FLAIR hyperintense signal surrounding the surgical bed and within the left corona radiata/posterior limb of the left internal capsule.  Dr. Leigh discussed these findings with neurosurgical physician's assistant Lilian on 1/29/2024 9:55 AM with read back.     --- End of Report ---

## 2024-02-08 NOTE — PHYSICAL EXAM
[General Appearance - Alert] : alert [General Appearance - In No Acute Distress] : in no acute distress [Clean] : clean [Dry] : dry [Healing Well] : healing well [No Drainage] : without drainage [Oriented To Time, Place, And Person] : oriented to person, place, and time [Affect] : the affect was normal [Impaired Insight] : insight and judgment were intact [Memory Recent] : recent memory was not impaired [Motor Handedness Right-Handed] : the patient is right hand dominant [Sclera] : the sclera and conjunctiva were normal [Neck Appearance] : the appearance of the neck was normal [] : no respiratory distress [Respiration, Rhythm And Depth] : normal respiratory rhythm and effort [Exaggerated Use Of Accessory Muscles For Inspiration] : no accessory muscle use [Abnormal Walk] : normal gait [Skin Color & Pigmentation] : normal skin color and pigmentation [Erythema] : not erythematous [Warm] : not warm [FreeTextEntry5] : Slight right tongue deviation noted on exam; otherwise CN II-XII grossly intact  [FreeTextEntry6] : right upper extremity and hand  strength 4+/5; otherwise 5/5 strength on exam

## 2024-02-08 NOTE — REASON FOR VISIT
[de-identified] : left sided awake craniotomy for resection of brain tumor [de-identified] : 1/26/24

## 2024-02-08 NOTE — ASSESSMENT
[FreeTextEntry1] : 44-year-old patient with a left frontal parietal contrast enhancing lesion has been monitored for a year for progressive and bloody changes. The patient underwent an awake craniotomy on the left side with a biopsy of the lesion, following which she had word finding difficulties and making paraphasic errors along with chest tightness. The patient is now recovering and showing strength gain, although the speech problems persist. we will wait for the second opinion on the pathology from [facility]. We will continue monitoring this lesion and based on new information, we will evaluate the need for additional surgery. Meanwhile, patient recovery support will continue.  Wound edges well approximated. No drainage, redness, swelling.   Dr. D'Amico independently reviewed all available images with patient.   PLAN: - CTA PE protocol - F/u with final path with plan  Educated no bending, lifting, or strenuous activities for 6 weeks.  Discussed walking is recommended as tolerated.    Educated to shower daily. Wash wound with mild or baby shampoo. Pat incision dry with separate dry towel.  Report all S/S infection including drainage, redness, warmth to touch of incision, chills.  No soaking of incision (ex: baths, tubs, pools) for 8 weeks from the time of surgery.  Keep incision protected from the sun for 3-6 months from time of surgery.   Patient verbalizes understanding of today's discussion and next steps in treatment plan.     A total of 15 minutes was spent reviewing the labs, imaging and physical examination of the patient. We discussed the diagnosis, and the plan. The patient's questions were answered. The patient demonstrated an excellent understanding of the plan.

## 2024-02-08 NOTE — REVIEW OF SYSTEMS
[Hand Weakness] :  hand weakness [Numbness] : numbness [Tingling] : tingling [Seizures] : convulsions [SOB on Exertion] : shortness of breath during exertion [As Noted in HPI] : as noted in HPI [Fever] : no fever [Chills] : no chills [Eyesight Problems] : no eyesight problems [Heart Rate Is Fast] : the heart rate was not fast [Chest Pain] : no chest pain

## 2024-02-09 DIAGNOSIS — E83.39 OTHER DISORDERS OF PHOSPHORUS METABOLISM: ICD-10-CM

## 2024-02-09 DIAGNOSIS — G93.6 CEREBRAL EDEMA: ICD-10-CM

## 2024-02-09 DIAGNOSIS — G93.0 CEREBRAL CYSTS: ICD-10-CM

## 2024-02-09 DIAGNOSIS — F17.290 NICOTINE DEPENDENCE, OTHER TOBACCO PRODUCT, UNCOMPLICATED: ICD-10-CM

## 2024-02-09 DIAGNOSIS — G81.91 HEMIPLEGIA, UNSPECIFIED AFFECTING RIGHT DOMINANT SIDE: ICD-10-CM

## 2024-02-09 DIAGNOSIS — D49.6 NEOPLASM OF UNSPECIFIED BEHAVIOR OF BRAIN: ICD-10-CM

## 2024-02-09 DIAGNOSIS — G93.5 COMPRESSION OF BRAIN: ICD-10-CM

## 2024-02-09 DIAGNOSIS — R56.9 UNSPECIFIED CONVULSIONS: ICD-10-CM

## 2024-02-09 DIAGNOSIS — Z82.3 FAMILY HISTORY OF STROKE: ICD-10-CM

## 2024-02-26 ENCOUNTER — NON-APPOINTMENT (OUTPATIENT)
Age: 44
End: 2024-02-26

## 2024-03-19 ENCOUNTER — NON-APPOINTMENT (OUTPATIENT)
Age: 44
End: 2024-03-19

## 2024-03-21 ENCOUNTER — NON-APPOINTMENT (OUTPATIENT)
Age: 44
End: 2024-03-21

## 2024-04-08 ENCOUNTER — OUTPATIENT (OUTPATIENT)
Dept: OUTPATIENT SERVICES | Facility: HOSPITAL | Age: 44
LOS: 1 days | End: 2024-04-08
Payer: COMMERCIAL

## 2024-04-08 ENCOUNTER — RESULT REVIEW (OUTPATIENT)
Age: 44
End: 2024-04-08

## 2024-04-08 ENCOUNTER — APPOINTMENT (OUTPATIENT)
Dept: MRI IMAGING | Facility: HOSPITAL | Age: 44
End: 2024-04-08

## 2024-04-08 DIAGNOSIS — Z98.891 HISTORY OF UTERINE SCAR FROM PREVIOUS SURGERY: Chronic | ICD-10-CM

## 2024-04-08 PROCEDURE — 70553 MRI BRAIN STEM W/O & W/DYE: CPT

## 2024-04-08 PROCEDURE — A9585: CPT

## 2024-04-08 PROCEDURE — 70553 MRI BRAIN STEM W/O & W/DYE: CPT | Mod: 26

## 2024-04-11 ENCOUNTER — NON-APPOINTMENT (OUTPATIENT)
Age: 44
End: 2024-04-11

## 2024-04-11 ENCOUNTER — APPOINTMENT (OUTPATIENT)
Dept: NEUROSURGERY | Facility: CLINIC | Age: 44
End: 2024-04-11
Payer: MEDICAID

## 2024-04-11 VITALS
RESPIRATION RATE: 18 BRPM | OXYGEN SATURATION: 96 % | WEIGHT: 155 LBS | SYSTOLIC BLOOD PRESSURE: 106 MMHG | BODY MASS INDEX: 29.27 KG/M2 | HEIGHT: 61 IN | HEART RATE: 57 BPM | DIASTOLIC BLOOD PRESSURE: 66 MMHG

## 2024-04-11 PROCEDURE — 99212 OFFICE O/P EST SF 10 MIN: CPT | Mod: 24

## 2024-04-11 NOTE — ASSESSMENT
[FreeTextEntry1] : 44-year-old patient with a left frontal parietal contrast enhancing lesion has been monitored for a year for progressive and bloody changes. The patient underwent an awake craniotomy on the left side with a biopsy of the lesion, following which she had word finding difficulties and making paraphasic errors along with chest tightness. Path consistent with glioneuronal tumor. MRI at 2 months stable. Language continues to improve. incision c/d/i.   Wound edges well approximated. No drainage, redness, swelling.   Dr. D'Amico independently reviewed all available images with patient.   PLAN: - MRI in 3 months - will discuss at Tumor board on Monday   A total of 15 minutes was spent reviewing the labs, imaging and physical examination of the patient. We discussed the diagnosis, and the plan. The patient's questions were answered. The patient demonstrated an excellent understanding of the plan.

## 2024-04-11 NOTE — DATA REVIEWED
[de-identified] : ACC: [id number]     EXAM:  MR BRAIN WAW IC   ORDERED BY: DARREN THEODORE  PROCEDURE DATE:  04/08/2024    INTERPRETATION:  PROCEDURE: MRI Brain with and without contrast  INDICATION: Brain lesion status post craniotomy.  TECHNIQUE: Multiplanar multisequence MR images of the brain were performed before and after the intravenous administration of 7.5 mL Gadavist contrast material.  Multi-scan ep2D dif MDDW images of the brain were obtained utilizing a Quicktome imaging guidance protocol which were used to process color maps of white matter tracts by outside software.  Multi-scan ep2d-fid-basic-bold images were obtained utilizing resting state functional MRI protocol which were processed by outside software.   COMPARISON: Most recent MRI of the brain dated 1/28/2024, and additional prior studies dating back to 1/20/2023  FINDINGS:  Status post left frontotemporal craniotomy for subtotal resection of left frontal opercular lesion. There is an extra-axial fluid collection deep to craniotomy site with peripheral rim of enhancement and adjacent dural thickening and enhancement. There is no restricted diffusion within the fluid collection. These findings likely represent postsurgical changes. The fluid collection has increased in size measuring up to 0.9 cm previously measured up to 0.4 cm. No significant interval change in persistent focus of T1 hyperintensity in this region measuring 1.0 x 0.8 cm, previously 0.8 x 0.9 cm (series 12, image 115). Compared to prior exam, there is now more conspicuous peripheral enhancement surrounding this region of T1 hyperintensity, measuring 1.9 x 1.0 cm (series 30, image 114).  Otherwise, there is no abnormal pathologic enhancement elsewhere in the brain.  There is stable nodular FLAIR hyperintense signal surrounding the surgical cavity along the left frontal operculum. The component in the left frontal operculum measures 1 x 0.8 cm (series 25 image 92).. No new areas of signal abnormality elsewhere.  There is susceptibility related signal loss just lateral to the surgical bed, at the site of a prior hemorrhage.  The ventricles and sulci are normal in size and configuration. No significant mass effect or midline shift. The diffusion-weighted images demonstrate no recent infarction. The vascular flow voids are present. The sella and suprasellar regions are unremarkable. Stable right middle cranial fossa arachnoid cyst.  The visualized paranasal sinuses are free of mucosal disease. The mastoid air cells are well-aerated.  IMPRESSION:  Since 1/28/2024, there is more prominent enhancement surrounding the T1 hyperintense focus in the left frontal opercular region as detailed above. These findings may represent evolving postsurgical changes although continued follow-up is recommended.  Interval increase size of the fluid collection deep to craniotomy site. There is no restricted diffusion in the fluid collection and likely representing postsurgical changes. No worsening mass effect or resultant midline shift.  --- End of Report ---     MELINA RAMIREZ MD; Resident Radiologist This document has been electronically signed. PHILLIP BOSTON MD; Attending Radiologist This document has been electronically signed. Apr 9 2024  4:03PM

## 2024-04-11 NOTE — PHYSICAL EXAM
[General Appearance - Alert] : alert [Clean] : clean [Dry] : dry [Healing Well] : healing well [No Drainage] : without drainage [Oriented To Time, Place, And Person] : oriented to person, place, and time [Person] : oriented to person [Place] : oriented to place [Time] : oriented to time

## 2024-04-11 NOTE — REASON FOR VISIT
[FreeTextEntry1] : hx of left sided awake crani for resection of brain lesion on 1/26/24. MRI review

## 2024-04-11 NOTE — HISTORY OF PRESENT ILLNESS
[FreeTextEntry1] : 43 y/o right- handed female, former smoker, with no significant PMHx, found to have left frontal motor strip cystic lesion on MRI 1/20/2023 during workup for progressive right fingertip numbness for which went to [facility] and MRI showed small area of signal abnormality in L posterior frontal subcortical and deep white matter, and 3.7x1.8 cm arachnoid cyst. Numbness and weakness continued to progress to entire Right arm and Right leg occurring several times daily and an aura sensation in her Left ear. MRI on 5/30/23 showed progression leading to referral to [facility] Neurosurgery and neurology. Started on keppra BID by Dr. Doty to r/o seizure, no improvement of symptoms. MS workup negative. Serial imaging completed which showed increase in size of lesion. Also with progression of symptoms to include right arm weakness, right leg intermittent weakness, right tongue deviation. Now s/p left sided awake craniotomy for resection of brain tumor 1/26/24.  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  (The overall bland appearance of the cells, abundant microcalcifications, absence of significant mitosis activity, and low Ki-67 proliferation index would support a low grade; however sent for further molecular testing at [facility] [place]; flow cytometry was performed by did not reveal adequate number of cells so cancelled; CSF negative for carcinoma)  2/8/24 post op: Speech issues with some improvement but continues to have some paraphasic errors. Also with intermittent headaches that are improving. Right sided numbness sensations and weakness improved immediate post op and since returned to how it was pre- op. Incision CDI.   Returns TODAY for follow- up and 2 month post op MRI review.  She continues to report headaches and speech issues.  MRI 4/8  Neurologist: Sobia Doty

## 2024-04-15 ENCOUNTER — NON-APPOINTMENT (OUTPATIENT)
Age: 44
End: 2024-04-15

## 2024-04-22 RX ORDER — LEVETIRACETAM 500 MG/1
500 TABLET, FILM COATED ORAL
Qty: 180 | Refills: 1 | Status: ACTIVE | COMMUNITY
Start: 2023-06-14 | End: 1900-01-01

## 2024-06-24 ENCOUNTER — RESULT REVIEW (OUTPATIENT)
Age: 44
End: 2024-06-24

## 2024-06-24 ENCOUNTER — OUTPATIENT (OUTPATIENT)
Dept: OUTPATIENT SERVICES | Facility: HOSPITAL | Age: 44
LOS: 1 days | End: 2024-06-24
Payer: MEDICAID

## 2024-06-24 ENCOUNTER — APPOINTMENT (OUTPATIENT)
Dept: MRI IMAGING | Facility: HOSPITAL | Age: 44
End: 2024-06-24

## 2024-06-24 DIAGNOSIS — Z98.891 HISTORY OF UTERINE SCAR FROM PREVIOUS SURGERY: Chronic | ICD-10-CM

## 2024-06-24 PROCEDURE — 70553 MRI BRAIN STEM W/O & W/DYE: CPT | Mod: 26

## 2024-06-24 PROCEDURE — 70553 MRI BRAIN STEM W/O & W/DYE: CPT

## 2024-06-24 PROCEDURE — A9585: CPT

## 2024-06-27 PROBLEM — Z98.890 S/P CRANIOTOMY: Status: ACTIVE | Noted: 2024-01-30

## 2024-06-27 PROBLEM — R56.9 SEIZURE-LIKE ACTIVITY: Status: ACTIVE | Noted: 2023-06-13

## 2024-06-27 PROBLEM — G93.9 BRAIN LESION: Status: ACTIVE | Noted: 2023-06-29

## 2024-07-03 ENCOUNTER — APPOINTMENT (OUTPATIENT)
Dept: NEUROSURGERY | Facility: CLINIC | Age: 44
End: 2024-07-03
Payer: MEDICAID

## 2024-07-03 DIAGNOSIS — G93.9 DISORDER OF BRAIN, UNSPECIFIED: ICD-10-CM

## 2024-07-03 DIAGNOSIS — Z98.890 OTHER SPECIFIED POSTPROCEDURAL STATES: ICD-10-CM

## 2024-07-03 DIAGNOSIS — R56.9 UNSPECIFIED CONVULSIONS: ICD-10-CM

## 2024-07-03 PROCEDURE — 99212 OFFICE O/P EST SF 10 MIN: CPT

## 2024-11-27 NOTE — PHYSICAL THERAPY INITIAL EVALUATION ADULT - SITTING BALANCE: DYNAMIC
[FreeTextEntry1] : Examination today reveals level pelvis symmetric leg lengths and a normal stance his gait reveals no evidence of spasticity or waddle.  He can walk quite nicely with normal heel strike and toe off though at times he will walk up on the balls of his feet.  He has supple motion to both hips without instability.  The knees are unremarkable as are the tibial segments with a normal torsion profile present.  He has supple motion to both ankle and feet and he has a wide excursion of dorsiflexion on both sides.  There is no evidence of heel cord contracture or spasticity he is neurologically intact.
normal balance

## 2025-01-22 ENCOUNTER — OUTPATIENT (OUTPATIENT)
Dept: OUTPATIENT SERVICES | Facility: HOSPITAL | Age: 45
LOS: 1 days | End: 2025-01-22
Payer: MEDICAID

## 2025-01-22 ENCOUNTER — APPOINTMENT (OUTPATIENT)
Dept: MRI IMAGING | Facility: HOSPITAL | Age: 45
End: 2025-01-22

## 2025-01-22 DIAGNOSIS — Z98.891 HISTORY OF UTERINE SCAR FROM PREVIOUS SURGERY: Chronic | ICD-10-CM

## 2025-01-22 PROCEDURE — 70553 MRI BRAIN STEM W/O & W/DYE: CPT

## 2025-01-22 PROCEDURE — A9585: CPT

## 2025-01-22 PROCEDURE — 70553 MRI BRAIN STEM W/O & W/DYE: CPT | Mod: 26

## 2025-01-29 ENCOUNTER — APPOINTMENT (OUTPATIENT)
Dept: NEUROSURGERY | Facility: CLINIC | Age: 45
End: 2025-01-29
Payer: MEDICAID

## 2025-01-29 DIAGNOSIS — Z98.890 OTHER SPECIFIED POSTPROCEDURAL STATES: ICD-10-CM

## 2025-01-29 DIAGNOSIS — R56.9 UNSPECIFIED CONVULSIONS: ICD-10-CM

## 2025-01-29 DIAGNOSIS — G93.9 DISORDER OF BRAIN, UNSPECIFIED: ICD-10-CM

## 2025-01-29 PROCEDURE — 99212 OFFICE O/P EST SF 10 MIN: CPT | Mod: 95

## 2025-02-19 ENCOUNTER — NON-APPOINTMENT (OUTPATIENT)
Age: 45
End: 2025-02-19

## 2025-02-19 DIAGNOSIS — R20.0 ANESTHESIA OF SKIN: ICD-10-CM

## 2025-02-19 DIAGNOSIS — R20.2 ANESTHESIA OF SKIN: ICD-10-CM

## 2025-02-19 DIAGNOSIS — M54.12 RADICULOPATHY, CERVICAL REGION: ICD-10-CM

## 2025-02-19 DIAGNOSIS — R29.898 OTHER SYMPTOMS AND SIGNS INVOLVING THE MUSCULOSKELETAL SYSTEM: ICD-10-CM

## 2025-03-07 ENCOUNTER — APPOINTMENT (OUTPATIENT)
Dept: NEUROLOGY | Facility: CLINIC | Age: 45
End: 2025-03-07

## 2025-03-24 ENCOUNTER — APPOINTMENT (OUTPATIENT)
Dept: NEUROLOGY | Facility: CLINIC | Age: 45
End: 2025-03-24
Payer: MEDICAID

## 2025-03-24 ENCOUNTER — NON-APPOINTMENT (OUTPATIENT)
Age: 45
End: 2025-03-24

## 2025-03-24 VITALS
DIASTOLIC BLOOD PRESSURE: 68 MMHG | SYSTOLIC BLOOD PRESSURE: 101 MMHG | BODY MASS INDEX: 30.21 KG/M2 | HEART RATE: 68 BPM | TEMPERATURE: 96.3 F | OXYGEN SATURATION: 98 % | HEIGHT: 61 IN | WEIGHT: 160 LBS

## 2025-03-24 DIAGNOSIS — G93.9 DISORDER OF BRAIN, UNSPECIFIED: ICD-10-CM

## 2025-03-24 DIAGNOSIS — R56.9 UNSPECIFIED CONVULSIONS: ICD-10-CM

## 2025-03-24 PROCEDURE — 99204 OFFICE O/P NEW MOD 45 MIN: CPT

## 2025-03-24 RX ORDER — IBUPROFEN 600 MG/1
600 TABLET, FILM COATED ORAL
Refills: 0 | Status: ACTIVE | COMMUNITY

## 2025-03-26 LAB — LEVETIRACETAM SERPL-MCNC: 18 UG/ML

## 2025-04-21 ENCOUNTER — APPOINTMENT (OUTPATIENT)
Dept: NEUROLOGY | Facility: CLINIC | Age: 45
End: 2025-04-21
Payer: MEDICAID

## 2025-04-21 PROCEDURE — 95816 EEG AWAKE AND DROWSY: CPT

## 2025-04-22 ENCOUNTER — APPOINTMENT (OUTPATIENT)
Dept: NEUROLOGY | Facility: CLINIC | Age: 45
End: 2025-04-22

## 2025-04-22 PROCEDURE — 95700 EEG CONT REC W/VID EEG TECH: CPT

## 2025-04-22 PROCEDURE — 95708 EEG WO VID EA 12-26HR UNMNTR: CPT

## 2025-04-22 PROCEDURE — 95719 EEG PHYS/QHP EA INCR W/O VID: CPT

## 2025-04-29 ENCOUNTER — APPOINTMENT (OUTPATIENT)
Dept: NEUROLOGY | Facility: CLINIC | Age: 45
End: 2025-04-29
Payer: MEDICAID

## 2025-04-29 DIAGNOSIS — R56.9 UNSPECIFIED CONVULSIONS: ICD-10-CM

## 2025-04-29 DIAGNOSIS — G93.9 DISORDER OF BRAIN, UNSPECIFIED: ICD-10-CM

## 2025-04-29 PROCEDURE — 99214 OFFICE O/P EST MOD 30 MIN: CPT | Mod: 95

## 2025-05-22 ENCOUNTER — NON-APPOINTMENT (OUTPATIENT)
Age: 45
End: 2025-05-22

## 2025-05-25 ENCOUNTER — OUTPATIENT (OUTPATIENT)
Dept: OUTPATIENT SERVICES | Facility: HOSPITAL | Age: 45
LOS: 1 days | End: 2025-05-25
Payer: COMMERCIAL

## 2025-05-25 DIAGNOSIS — Z98.891 HISTORY OF UTERINE SCAR FROM PREVIOUS SURGERY: Chronic | ICD-10-CM

## 2025-05-25 PROCEDURE — 70553 MRI BRAIN STEM W/O & W/DYE: CPT

## 2025-05-25 PROCEDURE — A9585: CPT

## 2025-05-25 PROCEDURE — 70553 MRI BRAIN STEM W/O & W/DYE: CPT | Mod: 26

## 2025-05-29 ENCOUNTER — APPOINTMENT (OUTPATIENT)
Dept: NEUROSURGERY | Facility: CLINIC | Age: 45
End: 2025-05-29
Payer: MEDICAID

## 2025-05-29 DIAGNOSIS — Z98.890 OTHER SPECIFIED POSTPROCEDURAL STATES: ICD-10-CM

## 2025-05-29 DIAGNOSIS — R56.9 UNSPECIFIED CONVULSIONS: ICD-10-CM

## 2025-05-29 DIAGNOSIS — G93.9 DISORDER OF BRAIN, UNSPECIFIED: ICD-10-CM

## 2025-05-29 PROCEDURE — 99213 OFFICE O/P EST LOW 20 MIN: CPT | Mod: 93

## 2025-06-02 ENCOUNTER — NON-APPOINTMENT (OUTPATIENT)
Age: 45
End: 2025-06-02

## 2025-06-25 ENCOUNTER — NON-APPOINTMENT (OUTPATIENT)
Age: 45
End: 2025-06-25

## 2025-06-25 ENCOUNTER — APPOINTMENT (OUTPATIENT)
Dept: INTERNAL MEDICINE | Facility: CLINIC | Age: 45
End: 2025-06-25
Payer: MEDICAID

## 2025-06-25 ENCOUNTER — APPOINTMENT (OUTPATIENT)
Dept: OTOLARYNGOLOGY | Facility: CLINIC | Age: 45
End: 2025-06-25

## 2025-06-25 VITALS
SYSTOLIC BLOOD PRESSURE: 113 MMHG | HEART RATE: 77 BPM | DIASTOLIC BLOOD PRESSURE: 75 MMHG | WEIGHT: 158 LBS | BODY MASS INDEX: 29.83 KG/M2 | HEIGHT: 61 IN | TEMPERATURE: 97.3 F | OXYGEN SATURATION: 95 %

## 2025-06-25 PROCEDURE — 93000 ELECTROCARDIOGRAM COMPLETE: CPT

## 2025-06-25 PROCEDURE — G2211 COMPLEX E/M VISIT ADD ON: CPT | Mod: NC

## 2025-06-25 PROCEDURE — 99214 OFFICE O/P EST MOD 30 MIN: CPT

## 2025-06-26 PROBLEM — R51.9 HEADACHE, CHRONIC DAILY: Status: ACTIVE | Noted: 2025-06-26

## 2025-06-26 RX ORDER — ACETAMINOPHEN 325 MG/1
325 TABLET ORAL EVERY 6 HOURS
Qty: 56 | Refills: 0 | Status: ACTIVE | COMMUNITY
Start: 2025-06-26 | End: 1900-01-01

## 2025-06-27 LAB
ALBUMIN SERPL ELPH-MCNC: 4.2 G/DL
ALP BLD-CCNC: 62 U/L
ALT SERPL-CCNC: 22 U/L
ANION GAP SERPL CALC-SCNC: 16 MMOL/L
APTT BLD: 27.3 SEC
AST SERPL-CCNC: 21 U/L
BASOPHILS # BLD AUTO: 0.03 K/UL
BASOPHILS NFR BLD AUTO: 0.6 %
BILIRUB SERPL-MCNC: 0.3 MG/DL
BUN SERPL-MCNC: 10 MG/DL
CALCIUM SERPL-MCNC: 9.7 MG/DL
CHLORIDE SERPL-SCNC: 106 MMOL/L
CO2 SERPL-SCNC: 18 MMOL/L
CREAT SERPL-MCNC: 0.77 MG/DL
EGFRCR SERPLBLD CKD-EPI 2021: 96 ML/MIN/1.73M2
EOSINOPHIL # BLD AUTO: 0.13 K/UL
EOSINOPHIL NFR BLD AUTO: 2.7 %
ESTIMATED AVERAGE GLUCOSE: 114 MG/DL
GLUCOSE SERPL-MCNC: 104 MG/DL
HBA1C MFR BLD HPLC: 5.6 %
HCG SERPL-MCNC: <1 MIU/ML
HCT VFR BLD CALC: 39.8 %
HGB BLD-MCNC: 12.3 G/DL
IMM GRANULOCYTES NFR BLD AUTO: 0.2 %
INR PPP: 0.89 RATIO
LYMPHOCYTES # BLD AUTO: 1.1 K/UL
LYMPHOCYTES NFR BLD AUTO: 22.9 %
MAN DIFF?: NORMAL
MCHC RBC-ENTMCNC: 30 PG
MCHC RBC-ENTMCNC: 30.9 G/DL
MCV RBC AUTO: 97.1 FL
MONOCYTES # BLD AUTO: 0.61 K/UL
MONOCYTES NFR BLD AUTO: 12.7 %
NEUTROPHILS # BLD AUTO: 2.93 K/UL
NEUTROPHILS NFR BLD AUTO: 60.9 %
PLATELET # BLD AUTO: 203 K/UL
POTASSIUM SERPL-SCNC: 4.4 MMOL/L
PROT SERPL-MCNC: 7.6 G/DL
PT BLD: 10.6 SEC
RBC # BLD: 4.1 M/UL
RBC # FLD: 13.1 %
SODIUM SERPL-SCNC: 140 MMOL/L
WBC # FLD AUTO: 4.81 K/UL

## 2025-07-02 ENCOUNTER — APPOINTMENT (OUTPATIENT)
Dept: NEUROLOGY | Facility: CLINIC | Age: 45
End: 2025-07-02

## 2025-07-02 PROCEDURE — 96116 NUBHVL XM PHYS/QHP 1ST HR: CPT | Mod: 95

## 2025-07-03 VITALS
TEMPERATURE: 97 F | HEART RATE: 71 BPM | HEIGHT: 61 IN | DIASTOLIC BLOOD PRESSURE: 67 MMHG | RESPIRATION RATE: 17 BRPM | OXYGEN SATURATION: 98 % | WEIGHT: 162.26 LBS | SYSTOLIC BLOOD PRESSURE: 104 MMHG

## 2025-07-03 RX ORDER — LEVETIRACETAM 10 MG/ML
1 INJECTION, SOLUTION INTRAVENOUS
Refills: 0 | DISCHARGE

## 2025-07-03 NOTE — PRE-OP CHECKLIST - WEIGHT IN LBS
162.2
Detail Level: Zone
Plan: -ordered labs CBC, HEPATIC, LIPID\\n-Initiated prednisone taper 10mg x2, 5mg x2
Continue Regimen: Claravis 20mg qd

## 2025-07-06 RX ORDER — POVIDONE-IODINE 7.5 %
1 SOLUTION, NON-ORAL TOPICAL ONCE
Refills: 0 | Status: COMPLETED | OUTPATIENT
Start: 2025-07-07 | End: 2025-07-07

## 2025-07-07 ENCOUNTER — RESULT REVIEW (OUTPATIENT)
Age: 45
End: 2025-07-07

## 2025-07-07 ENCOUNTER — NON-APPOINTMENT (OUTPATIENT)
Age: 45
End: 2025-07-07

## 2025-07-07 ENCOUNTER — INPATIENT (INPATIENT)
Facility: HOSPITAL | Age: 45
LOS: 1 days | Discharge: HOME | End: 2025-07-09
Attending: NEUROLOGICAL SURGERY | Admitting: NEUROLOGICAL SURGERY
Payer: COMMERCIAL

## 2025-07-07 ENCOUNTER — APPOINTMENT (OUTPATIENT)
Dept: NEUROSURGERY | Facility: HOSPITAL | Age: 45
End: 2025-07-07

## 2025-07-07 DIAGNOSIS — Z98.890 OTHER SPECIFIED POSTPROCEDURAL STATES: Chronic | ICD-10-CM

## 2025-07-07 DIAGNOSIS — Z98.891 HISTORY OF UTERINE SCAR FROM PREVIOUS SURGERY: Chronic | ICD-10-CM

## 2025-07-07 LAB
ANION GAP SERPL CALC-SCNC: 10 MMOL/L — SIGNIFICANT CHANGE UP (ref 5–17)
APTT BLD: 26 SEC — LOW (ref 26.1–36.8)
BLD GP AB SCN SERPL QL: NEGATIVE — SIGNIFICANT CHANGE UP
BUN SERPL-MCNC: 14 MG/DL — SIGNIFICANT CHANGE UP (ref 7–23)
CALCIUM SERPL-MCNC: 8.9 MG/DL — SIGNIFICANT CHANGE UP (ref 8.4–10.5)
CHLORIDE SERPL-SCNC: 106 MMOL/L — SIGNIFICANT CHANGE UP (ref 96–108)
CO2 SERPL-SCNC: 21 MMOL/L — LOW (ref 22–31)
CREAT SERPL-MCNC: 0.61 MG/DL — SIGNIFICANT CHANGE UP (ref 0.5–1.3)
EGFR: 111 ML/MIN/1.73M2 — SIGNIFICANT CHANGE UP
EGFR: 111 ML/MIN/1.73M2 — SIGNIFICANT CHANGE UP
GLUCOSE SERPL-MCNC: 135 MG/DL — HIGH (ref 70–99)
HCT VFR BLD CALC: 34.3 % — LOW (ref 34.5–45)
HGB BLD-MCNC: 11.3 G/DL — LOW (ref 11.5–15.5)
INR BLD: 0.9 — SIGNIFICANT CHANGE UP (ref 0.85–1.16)
MAGNESIUM SERPL-MCNC: 1.8 MG/DL — SIGNIFICANT CHANGE UP (ref 1.6–2.6)
MCHC RBC-ENTMCNC: 30.1 PG — SIGNIFICANT CHANGE UP (ref 27–34)
MCHC RBC-ENTMCNC: 32.9 G/DL — SIGNIFICANT CHANGE UP (ref 32–36)
MCV RBC AUTO: 91.2 FL — SIGNIFICANT CHANGE UP (ref 80–100)
NRBC # BLD AUTO: 0 K/UL — SIGNIFICANT CHANGE UP (ref 0–0)
NRBC # FLD: 0 K/UL — SIGNIFICANT CHANGE UP (ref 0–0)
NRBC BLD AUTO-RTO: 0 /100 WBCS — SIGNIFICANT CHANGE UP (ref 0–0)
PHOSPHATE SERPL-MCNC: 2.4 MG/DL — LOW (ref 2.5–4.5)
PLATELET # BLD AUTO: 290 K/UL — SIGNIFICANT CHANGE UP (ref 150–400)
PMV BLD: 9.5 FL — SIGNIFICANT CHANGE UP (ref 7–13)
POTASSIUM SERPL-MCNC: 4.2 MMOL/L — SIGNIFICANT CHANGE UP (ref 3.5–5.3)
POTASSIUM SERPL-SCNC: 4.2 MMOL/L — SIGNIFICANT CHANGE UP (ref 3.5–5.3)
PROTHROM AB SERPL-ACNC: 10.4 SEC — SIGNIFICANT CHANGE UP (ref 9.9–13.4)
RBC # BLD: 3.76 M/UL — LOW (ref 3.8–5.2)
RBC # FLD: 11.8 % — SIGNIFICANT CHANGE UP (ref 10.3–14.5)
RH IG SCN BLD-IMP: POSITIVE — SIGNIFICANT CHANGE UP
SODIUM SERPL-SCNC: 137 MMOL/L — SIGNIFICANT CHANGE UP (ref 135–145)
WBC # BLD: 5.72 K/UL — SIGNIFICANT CHANGE UP (ref 3.8–10.5)
WBC # FLD AUTO: 5.72 K/UL — SIGNIFICANT CHANGE UP (ref 3.8–10.5)

## 2025-07-07 PROCEDURE — 61781 SCAN PROC CRANIAL INTRA: CPT

## 2025-07-07 PROCEDURE — 99024 POSTOP FOLLOW-UP VISIT: CPT

## 2025-07-07 PROCEDURE — 84100 ASSAY OF PHOSPHORUS: CPT

## 2025-07-07 PROCEDURE — 85610 PROTHROMBIN TIME: CPT

## 2025-07-07 PROCEDURE — 95961 ELECTRODE STIMULATION BRAIN: CPT | Mod: 26

## 2025-07-07 PROCEDURE — 85027 COMPLETE CBC AUTOMATED: CPT

## 2025-07-07 PROCEDURE — 99291 CRITICAL CARE FIRST HOUR: CPT

## 2025-07-07 PROCEDURE — 86850 RBC ANTIBODY SCREEN: CPT

## 2025-07-07 PROCEDURE — 88307 TISSUE EXAM BY PATHOLOGIST: CPT | Mod: 26

## 2025-07-07 PROCEDURE — 88334 PATH CONSLTJ SURG CYTO XM EA: CPT | Mod: 26,59

## 2025-07-07 PROCEDURE — 88342 IMHCHEM/IMCYTCHM 1ST ANTB: CPT | Mod: 26

## 2025-07-07 PROCEDURE — 88331 PATH CONSLTJ SURG 1 BLK 1SPC: CPT | Mod: 26

## 2025-07-07 PROCEDURE — 76377 3D RENDER W/INTRP POSTPROCES: CPT | Mod: 26

## 2025-07-07 PROCEDURE — 86901 BLOOD TYPING SEROLOGIC RH(D): CPT

## 2025-07-07 PROCEDURE — 88341 IMHCHEM/IMCYTCHM EA ADD ANTB: CPT | Mod: 26

## 2025-07-07 PROCEDURE — 36415 COLL VENOUS BLD VENIPUNCTURE: CPT

## 2025-07-07 PROCEDURE — 85730 THROMBOPLASTIN TIME PARTIAL: CPT

## 2025-07-07 PROCEDURE — 80048 BASIC METABOLIC PNL TOTAL CA: CPT

## 2025-07-07 PROCEDURE — 83735 ASSAY OF MAGNESIUM: CPT

## 2025-07-07 PROCEDURE — 86900 BLOOD TYPING SEROLOGIC ABO: CPT

## 2025-07-07 PROCEDURE — 82962 GLUCOSE BLOOD TEST: CPT

## 2025-07-07 PROCEDURE — 61510 CRNEC TREPH EXC BRN TUM STTL: CPT | Mod: 22

## 2025-07-07 DEVICE — ELCTR SPINAL KIT (6 CONTACTS): Type: IMPLANTABLE DEVICE | Site: LEFT | Status: FUNCTIONAL

## 2025-07-07 DEVICE — PLATE COVER BURRHOLE UN3 W/TAB 10MM: Type: IMPLANTABLE DEVICE | Site: LEFT | Status: FUNCTIONAL

## 2025-07-07 DEVICE — SCREW UN3 AXS SELF DRILL 1.5X4MM: Type: IMPLANTABLE DEVICE | Site: LEFT | Status: FUNCTIONAL

## 2025-07-07 DEVICE — TACHOSIL 4.8 X 4.8CM: Type: IMPLANTABLE DEVICE | Site: LEFT | Status: FUNCTIONAL

## 2025-07-07 DEVICE — MATRIX DURAGEN PLUS DURAL REGENERATION 3X3: Type: IMPLANTABLE DEVICE | Site: LEFT | Status: FUNCTIONAL

## 2025-07-07 DEVICE — PLATE LO PROF 8 HOLE: Type: IMPLANTABLE DEVICE | Site: LEFT | Status: FUNCTIONAL

## 2025-07-07 RX ORDER — ACETAMINOPHEN 500 MG/5ML
1000 LIQUID (ML) ORAL EVERY 8 HOURS
Refills: 0 | Status: COMPLETED | OUTPATIENT
Start: 2025-07-07 | End: 2025-07-09

## 2025-07-07 RX ORDER — DEXTROSE 50 % IN WATER 50 %
15 SYRINGE (ML) INTRAVENOUS ONCE
Refills: 0 | Status: DISCONTINUED | OUTPATIENT
Start: 2025-07-07 | End: 2025-07-07

## 2025-07-07 RX ORDER — INSULIN LISPRO 100 U/ML
INJECTION, SOLUTION INTRAVENOUS; SUBCUTANEOUS
Refills: 0 | Status: DISCONTINUED | OUTPATIENT
Start: 2025-07-07 | End: 2025-07-07

## 2025-07-07 RX ORDER — SODIUM CHLORIDE 9 G/1000ML
1000 INJECTION, SOLUTION INTRAVENOUS
Refills: 0 | Status: DISCONTINUED | OUTPATIENT
Start: 2025-07-07 | End: 2025-07-07

## 2025-07-07 RX ORDER — DEXAMETHASONE 0.5 MG/1
4 TABLET ORAL THREE TIMES A DAY
Refills: 0 | Status: DISCONTINUED | OUTPATIENT
Start: 2025-07-09 | End: 2025-07-09

## 2025-07-07 RX ORDER — DEXAMETHASONE 0.5 MG/1
4 TABLET ORAL EVERY 6 HOURS
Refills: 0 | Status: COMPLETED | OUTPATIENT
Start: 2025-07-07 | End: 2025-07-09

## 2025-07-07 RX ORDER — APREPITANT 40 MG/1
40 CAPSULE ORAL ONCE
Refills: 0 | Status: COMPLETED | OUTPATIENT
Start: 2025-07-07 | End: 2025-07-07

## 2025-07-07 RX ORDER — ONDANSETRON HCL/PF 4 MG/2 ML
4 VIAL (ML) INJECTION EVERY 6 HOURS
Refills: 0 | Status: DISCONTINUED | OUTPATIENT
Start: 2025-07-07 | End: 2025-07-07

## 2025-07-07 RX ORDER — HYDROMORPHONE/SOD CHLOR,ISO/PF 2 MG/10 ML
0.5 SYRINGE (ML) INJECTION
Refills: 0 | Status: DISCONTINUED | OUTPATIENT
Start: 2025-07-07 | End: 2025-07-08

## 2025-07-07 RX ORDER — GLUCAGON 3 MG/1
1 POWDER NASAL ONCE
Refills: 0 | Status: DISCONTINUED | OUTPATIENT
Start: 2025-07-07 | End: 2025-07-07

## 2025-07-07 RX ORDER — OXYCODONE HYDROCHLORIDE 30 MG/1
10 TABLET ORAL EVERY 4 HOURS
Refills: 0 | Status: DISCONTINUED | OUTPATIENT
Start: 2025-07-07 | End: 2025-07-07

## 2025-07-07 RX ORDER — POLYETHYLENE GLYCOL 3350 17 G/17G
17 POWDER, FOR SOLUTION ORAL DAILY
Refills: 0 | Status: DISCONTINUED | OUTPATIENT
Start: 2025-07-07 | End: 2025-07-08

## 2025-07-07 RX ORDER — ONDANSETRON HCL/PF 4 MG/2 ML
4 VIAL (ML) INJECTION EVERY 6 HOURS
Refills: 0 | Status: COMPLETED | OUTPATIENT
Start: 2025-07-07 | End: 2025-07-09

## 2025-07-07 RX ORDER — INSULIN LISPRO 100 U/ML
INJECTION, SOLUTION INTRAVENOUS; SUBCUTANEOUS
Refills: 0 | Status: DISCONTINUED | OUTPATIENT
Start: 2025-07-07 | End: 2025-07-09

## 2025-07-07 RX ORDER — DEXAMETHASONE 0.5 MG/1
2 TABLET ORAL DAILY
Refills: 0 | Status: CANCELLED | OUTPATIENT
Start: 2025-07-14 | End: 2025-07-09

## 2025-07-07 RX ORDER — DEXAMETHASONE 0.5 MG/1
4 TABLET ORAL
Refills: 0 | Status: DISCONTINUED | OUTPATIENT
Start: 2025-07-10 | End: 2025-07-09

## 2025-07-07 RX ORDER — OXYCODONE HYDROCHLORIDE 30 MG/1
5 TABLET ORAL EVERY 4 HOURS
Refills: 0 | Status: DISCONTINUED | OUTPATIENT
Start: 2025-07-07 | End: 2025-07-07

## 2025-07-07 RX ORDER — DEXAMETHASONE 0.5 MG/1
2 TABLET ORAL
Refills: 0 | Status: CANCELLED | OUTPATIENT
Start: 2025-07-13 | End: 2025-07-09

## 2025-07-07 RX ORDER — ACETAMINOPHEN 500 MG/5ML
1000 LIQUID (ML) ORAL EVERY 8 HOURS
Refills: 0 | Status: DISCONTINUED | OUTPATIENT
Start: 2025-07-07 | End: 2025-07-07

## 2025-07-07 RX ORDER — DEXTROSE 50 % IN WATER 50 %
12.5 SYRINGE (ML) INTRAVENOUS ONCE
Refills: 0 | Status: DISCONTINUED | OUTPATIENT
Start: 2025-07-07 | End: 2025-07-07

## 2025-07-07 RX ORDER — LABETALOL HYDROCHLORIDE 200 MG/1
10 TABLET, FILM COATED ORAL
Refills: 0 | Status: DISCONTINUED | OUTPATIENT
Start: 2025-07-07 | End: 2025-07-07

## 2025-07-07 RX ORDER — OXYCODONE HYDROCHLORIDE 30 MG/1
10 TABLET ORAL EVERY 4 HOURS
Refills: 0 | Status: DISCONTINUED | OUTPATIENT
Start: 2025-07-07 | End: 2025-07-09

## 2025-07-07 RX ORDER — LEVETIRACETAM 10 MG/ML
500 INJECTION, SOLUTION INTRAVENOUS
Refills: 0 | Status: DISCONTINUED | OUTPATIENT
Start: 2025-07-07 | End: 2025-07-09

## 2025-07-07 RX ORDER — DEXAMETHASONE 0.5 MG/1
2 TABLET ORAL THREE TIMES A DAY
Refills: 0 | Status: CANCELLED | OUTPATIENT
Start: 2025-07-12 | End: 2025-07-09

## 2025-07-07 RX ORDER — DEXAMETHASONE 0.5 MG/1
TABLET ORAL
Refills: 0 | Status: DISCONTINUED | OUTPATIENT
Start: 2025-07-07 | End: 2025-07-09

## 2025-07-07 RX ORDER — DEXTROSE 50 % IN WATER 50 %
25 SYRINGE (ML) INTRAVENOUS ONCE
Refills: 0 | Status: DISCONTINUED | OUTPATIENT
Start: 2025-07-07 | End: 2025-07-07

## 2025-07-07 RX ORDER — CEFAZOLIN SODIUM IN 0.9 % NACL 3 G/100 ML
2000 INTRAVENOUS SOLUTION, PIGGYBACK (ML) INTRAVENOUS EVERY 8 HOURS
Refills: 0 | Status: COMPLETED | OUTPATIENT
Start: 2025-07-07 | End: 2025-07-08

## 2025-07-07 RX ORDER — ACETAMINOPHEN 500 MG/5ML
1000 LIQUID (ML) ORAL ONCE
Refills: 0 | Status: COMPLETED | OUTPATIENT
Start: 2025-07-07 | End: 2025-07-07

## 2025-07-07 RX ORDER — OXYCODONE HYDROCHLORIDE 30 MG/1
5 TABLET ORAL EVERY 4 HOURS
Refills: 0 | Status: DISCONTINUED | OUTPATIENT
Start: 2025-07-07 | End: 2025-07-09

## 2025-07-07 RX ORDER — MAGNESIUM SULFATE 500 MG/ML
2 SYRINGE (ML) INJECTION ONCE
Refills: 0 | Status: COMPLETED | OUTPATIENT
Start: 2025-07-07 | End: 2025-07-07

## 2025-07-07 RX ORDER — SODIUM PHOSPHATE,DIBASIC DIHYD
30 POWDER (GRAM) MISCELLANEOUS ONCE
Refills: 0 | Status: COMPLETED | OUTPATIENT
Start: 2025-07-07 | End: 2025-07-07

## 2025-07-07 RX ORDER — SENNA 187 MG
2 TABLET ORAL AT BEDTIME
Refills: 0 | Status: DISCONTINUED | OUTPATIENT
Start: 2025-07-07 | End: 2025-07-09

## 2025-07-07 RX ADMIN — Medication 400 MILLIGRAM(S): at 13:00

## 2025-07-07 RX ADMIN — Medication 1 APPLICATION(S): at 07:00

## 2025-07-07 RX ADMIN — Medication 2 TABLET(S): at 21:29

## 2025-07-07 RX ADMIN — Medication 1000 MILLILITER(S): at 23:04

## 2025-07-07 RX ADMIN — Medication 100 MILLIGRAM(S): at 16:27

## 2025-07-07 RX ADMIN — Medication 1000 MILLIGRAM(S): at 21:59

## 2025-07-07 RX ADMIN — DEXAMETHASONE 4 MILLIGRAM(S): 0.5 TABLET ORAL at 23:59

## 2025-07-07 RX ADMIN — INSULIN LISPRO 4: 100 INJECTION, SOLUTION INTRAVENOUS; SUBCUTANEOUS at 21:48

## 2025-07-07 RX ADMIN — OXYCODONE HYDROCHLORIDE 5 MILLIGRAM(S): 30 TABLET ORAL at 14:00

## 2025-07-07 RX ADMIN — OXYCODONE HYDROCHLORIDE 5 MILLIGRAM(S): 30 TABLET ORAL at 14:30

## 2025-07-07 RX ADMIN — Medication 25 GRAM(S): at 13:58

## 2025-07-07 RX ADMIN — APREPITANT 40 MILLIGRAM(S): 40 CAPSULE ORAL at 07:21

## 2025-07-07 RX ADMIN — Medication 1000 MILLIGRAM(S): at 21:29

## 2025-07-07 RX ADMIN — Medication 4 MILLIGRAM(S): at 17:11

## 2025-07-07 RX ADMIN — OXYCODONE HYDROCHLORIDE 10 MILLIGRAM(S): 30 TABLET ORAL at 18:00

## 2025-07-07 RX ADMIN — Medication 0.5 MILLIGRAM(S): at 20:35

## 2025-07-07 RX ADMIN — Medication 4 MILLIGRAM(S): at 23:59

## 2025-07-07 RX ADMIN — OXYCODONE HYDROCHLORIDE 10 MILLIGRAM(S): 30 TABLET ORAL at 17:30

## 2025-07-07 RX ADMIN — DEXAMETHASONE 4 MILLIGRAM(S): 0.5 TABLET ORAL at 17:12

## 2025-07-07 RX ADMIN — Medication 70 MILLILITER(S): at 13:30

## 2025-07-07 RX ADMIN — Medication 1 APPLICATION(S): at 07:23

## 2025-07-07 RX ADMIN — Medication 85 MILLIMOLE(S): at 16:23

## 2025-07-07 RX ADMIN — Medication 1000 MILLIGRAM(S): at 13:15

## 2025-07-07 RX ADMIN — Medication 75 MILLILITER(S): at 13:21

## 2025-07-07 RX ADMIN — LEVETIRACETAM 500 MILLIGRAM(S): 10 INJECTION, SOLUTION INTRAVENOUS at 17:11

## 2025-07-07 RX ADMIN — INSULIN LISPRO 4: 100 INJECTION, SOLUTION INTRAVENOUS; SUBCUTANEOUS at 16:28

## 2025-07-07 RX ADMIN — Medication 0.5 MILLIGRAM(S): at 20:50

## 2025-07-07 NOTE — PROGRESS NOTE ADULT - SUBJECTIVE AND OBJECTIVE BOX
NEUROSURGERY POST OP NOTE:    POD# 0 S/P left awake craniotomy tumor resection    S: pt reports L posterior headache, 8/10. denies vision changes, sob, cp, n/v, weakness, numbness.      T(C): 35.9 (07-07-25 @ 08:14), Max: 35.9 (07-07-25 @ 08:14)  HR: 60 (07-07-25 @ 13:15) (55 - 71)  BP: 100/56 (07-07-25 @ 13:15) (100/56 - 106/60)  RR: 17 (07-07-25 @ 13:15) (17 - 19)  SpO2: 97% (07-07-25 @ 13:15) (96% - 98%)        acetaminophen     Tablet .. 1000 milliGRAM(s) Oral every 8 hours  ceFAZolin   IVPB 2000 milliGRAM(s) IV Intermittent every 8 hours  dexAMETHasone  Injectable   IV Push   dexAMETHasone  Injectable 4 milliGRAM(s) IV Push every 6 hours  dextrose 5%. 1000 milliLiter(s) IV Continuous <Continuous>  dextrose 5%. 1000 milliLiter(s) IV Continuous <Continuous>  dextrose 50% Injectable 25 Gram(s) IV Push once  dextrose 50% Injectable 12.5 Gram(s) IV Push once  dextrose 50% Injectable 25 Gram(s) IV Push once  dextrose Oral Gel 15 Gram(s) Oral once PRN  glucagon  Injectable 1 milliGRAM(s) IntraMuscular once  HYDROmorphone  Injectable 0.5 milliGRAM(s) IV Push every 3 hours PRN  insulin lispro (ADMELOG) corrective regimen sliding scale   SubCutaneous three times a day before meals  labetalol Injectable 10 milliGRAM(s) IV Push every 2 hours PRN  levETIRAcetam 500 milliGRAM(s) Oral two times a day  ondansetron   Disintegrating Tablet 4 milliGRAM(s) Oral every 6 hours  ondansetron Injectable 4 milliGRAM(s) IV Push every 6 hours PRN  oxyCODONE    IR 5 milliGRAM(s) Oral every 4 hours PRN  pantoprazole    Tablet 40 milliGRAM(s) Oral before breakfast  polyethylene glycol 3350 17 Gram(s) Oral daily PRN  senna 2 Tablet(s) Oral at bedtime  sodium chloride 0.9%. 1000 milliLiter(s) IV Continuous <Continuous>  sodium chloride 0.9%. 1000 milliLiter(s) IV Continuous <Continuous>      RADIOLOGY:     Exam:  GEN: laying in bed, NAD  NEURO: AOx3. FC, OE spont, +expressive aphasia, mild R nasolabial fold flattening. CNII-XII intact. PERRL, EOMI. +mild RUE drift. 5/5 strength throughout. mild decreased sensation RUE o/w SILT.   CV: RRR +S1/S2  PULM: CTAB  GI: Abd soft, NT/ND  EXT: ext warm, dry, nontender  WOUND/DRAINS: headwrap in place c/d/i      Assessment: 44 yo F PMH left frontal cystic lesion discovered in January 2023 during workup for progressive right fingertip numbness. She underwent a left awake craniotomy on 1/26/24. 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 She now reports possible worsening seizure activity versus anxiety. MRI shows a grossly stable surgical cavity with a tiny new focus of T1 hyperintense blood products in the periventricular region and slightly more prominent FLAIR hyperintensity and slight enlargement of enhancement oftumor lesion. Now s/p L awake craniotomy for resection, frozen: glial cell origin (7/7/25)      Plan:  Neuro:  - neuro.vitals q1h  - pain control: cranial ERAS  - seziure tx: continue home keppra 500mg bid   - dex taper 1 week to off  - post op MRI pending  - epilepsy consult pending    Cardiac:  - SBP goal: 100-140    Pulm:  - RA    GI:  - ADAT    Renal:  - IVL    Endo:  - ISS    Heme:  - SCDs    ID:  - post op ancef    DISPO: NSICU, full code, pending PT/OT    Discussed w Dr. D'Amico, Dr. Lao    Assessment:  Present when checked    []  GCS  E   V  M     Heart Failure: []Acute, [] acute on chronic , []chronic  Heart Failure:  [] Diastolic (HFpEF), [] Systolic (HFrEF), []Combined (HFpEF and HFrEF), [] RHF, [] Pulm HTN, [] Other    [] TEMO, [] ATN, [] AIN, [] other  [] CKD1, [] CKD2, [] CKD 3, [] CKD 4, [] CKD 5, []ESRD    Encephalopathy: [] Metabolic, [] Hepatic, [] toxic, [] Neurological, [] Other    Abnormal Nurtitional Status: [] malnurtition (see nutrition note), [ ]underweight: BMI < 19, [] morbid obesity: BMI >40, [] Cachexia    [] Sepsis  [] hypovolemic shock,[] cardiogenic shock, [] hemorrhagic shock, [] neuogenic shock  [] Acute Respiratory Failure  []Cerebral edema, [] Brain compression/ herniation,   [] Functional quadriplegia  [] Acute blood loss anemia

## 2025-07-07 NOTE — H&P ADULT - HISTORY OF PRESENT ILLNESS
Taken from outpatient neurosurgery note "46 y/o right- handed female, former smoker, with no significant PMHx, found to have left frontal motor strip cystic lesion on MRI 1/20/2023 during workup for progressive right fingertip numbness for which went to [facility] and MRI showed small area of signal abnormality in L posterior frontal subcortical and deep white matter, and 3.7x1.8 cm arachnoid cyst. Numbness and weakness continued to progress to entire Right arm and Right leg occurring several times daily and an aura sensation in her Left ear. MRI on 5/30/23 showed progression leading to referral to [facility] Neurosurgery and neurology. Started on keppra BID by Dr. Doty to r/o seizure, no improvement of symptoms. MS workup negative. Serial imaging completed which showed increase in size of lesion. 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 Also with intermittent headaches that are improving.Right sided numbness sensations and weakness improved immediate post op and since returned to how it was pre- op.Incision CDI.4/11/24 patient returned for f/u and 2 month repeat MRI review which was done 4/8 and stable.She continues to report headaches and speech issues but language improving.Recommended to repeat MRI 3 months.7/3/24 pt returned for 3 month follow- up and review of MRI done 6/24/24 which was stable.Speech stable. Recommended 6 month follow- up.1/29/25 pt returned for 6 month follow- up and review of 1/22/25 MRI which was stable. Recommended 6 month follow- up, continue neurology follow- up for seizure control/ AED management. 5/25/25 MRI- Ovoid region of intrinsic T1 hyperintensity and surrounding mild enhancement along the left frontoparietal opercular resection cavity appears grossly similar in size to prior study. Mild FLAIR hyperintensity surrounding the cavity is similar in extent.There is a tiny new focus of T1 hyperintense blood products medial to the cavity, in the periventricular region, with slightly more prominent FLAIR hyperintensity.Returns TODAY for follow- up and MRI review. She is currently on keppra 500 mg BID. Neurologist: Sobia Doty--> Scarlet Alvarado NP  "      Taken from outpatient neurosurgery note "46 y/o right- handed female, former smoker, with no significant PMHx, found to have left frontal motor strip cystic lesion on MRI 1/20/2023 during workup for progressive right fingertip numbness for which went to [facility] and MRI showed small area of signal abnormality in L posterior frontal subcortical and deep white matter, and 3.7x1.8 cm arachnoid cyst. Numbness and weakness continued to progress to entire Right arm and Right leg occurring several times daily and an aura sensation in her Left ear. MRI on 5/30/23 showed progression leading to referral to [facility] Neurosurgery and neurology. Started on keppra BID by Dr. Doty to r/o seizure, no improvement of symptoms. MS workup negative. Serial imaging completed which showed increase in size of lesion. 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 Also with intermittent headaches that are improving.Right sided numbness sensations and weakness improved immediate post op and since returned to how it was pre- op.Incision CDI.4/11/24 patient returned for f/u and 2 month repeat MRI review which was done 4/8 and stable.She continues to report headaches and speech issues but language improving.Recommended to repeat MRI 3 months.7/3/24 pt returned for 3 month follow- up and review of MRI done 6/24/24 which was stable.Speech stable. Recommended 6 month follow- up.1/29/25 pt returned for 6 month follow- up and review of 1/22/25 MRI which was stable. Recommended 6 month follow- up, continue neurology follow- up for seizure control/ AED management. 5/25/25 MRI- Ovoid region of intrinsic T1 hyperintensity and surrounding mild enhancement along the left frontoparietal opercular resection cavity appears grossly similar in size to prior study. Mild FLAIR hyperintensity surrounding the cavity is similar in extent.There is a tiny new focus of T1 hyperintense blood products medial to the cavity, in the periventricular region, with slightly more prominent FLAIR hyperintensity.Returns TODAY for follow- up and MRI review. She is currently on keppra 500 mg BID. Neurologist: Sobia Doty--> Scarlet Alvarado NP  "     46 y/o female presents for surgery today.  She reports continued right sided paresthesias and numbness.

## 2025-07-07 NOTE — PROGRESS NOTE ADULT - SUBJECTIVE AND OBJECTIVE BOX
=========================  NSICU ATTENDING PROGRESS NOTE  =========================      HPI: Taken from outpatient documentation  Patient is a 44 y/o right- handed female, former smoker, with no significant PMHx, found to have left frontal motor strip cystic lesion on MRI 1/20/2023 during workup for progressive right fingertip numbness for which went to [facility] and MRI showed small area of signal abnormality in L posterior frontal subcortical and deep white matter, and 3.7x1.8 cm arachnoid cyst. Numbness and weakness continued to progress to entire Right arm and Right leg occurring several times daily and an aura sensation in her Left ear. MRI on 5/30/23 showed progression leading to referral to [facility] and neurology. Started on keppra BID by Dr. Doty to r/o seizure, no improvement of symptoms. MS workup negative. Serial imaging completed which showed increase in size of lesion. Also with progression of symptoms to include right arm weakness, right leg intermittent weakness, right tongue deviation. Underwent left sided awake craniotomy for resection of brain tumor 1/26/24. 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  2/8/24 post op: Speech issues with some improvement but continues to have some paraphasic errors. Also with intermittent headaches that are improving. Right sided numbness sensations and weakness improved immediate post op and since returned to baseline pre- op  4/11/24 Patient returned for f/u and 2 month repeat MRI review which was done 4/8 and stable. She continues to report headaches and speech issues but language improving. Recommended to repeat MRI 3 months.  7/3/24 pt returned for 3 month follow- up and review of MRI done 6/24/24 which was stable. Speech stable. Recommended 6 month follow- up.  1/29/25 pt returned for 6 month follow- up and review of 1/22/25 MRI which was stable. Recommended 6 month follow- up, continue neurology follow- up for seizure control/ AED management.   5/25/25 MRI- Ovoid region of intrinsic T1 hyperintensity and surrounding mild enhancement along the left frontoparietal opercular resection cavity appears grossly similar in size to prior study. Mild FLAIR hyperintensity surrounding the cavity is similar in extent. There is a tiny new focus of T1 hyperintense blood products medial to the cavity, in the periventricular region, with slightly more prominent FLAIR hyperintensity. Patient is currently on keppra 500 mg BID.   Presents for surgery today.  She reports continued right sided paresthesias and numbness. (07 Jul 2025 06:27)      On admission, the patient was:  GCS: 15  Hunt-Joaquin:  modified Freitas:  ICH score:  NIHSS:    *** HIGH RISK OF DVT PRESENT ON ADMISSION ***      ICU Vital Signs Last 24 Hrs  T(C): 35.9 (07 Jul 2025 08:14), Max: 35.9 (07 Jul 2025 08:14)  HR: 71 (07 Jul 2025 08:14) (71 - 71)  BP: 104/67 (07 Jul 2025 08:14) (104/67 - 104/67)  RR: 17 (07 Jul 2025 08:14) (17 - 17)  SpO2: 98% (07 Jul 2025 08:14) (98% - 98%)    EXAMINATION:  General: Calm  HEENT:  MMM  Neuro: Immediate post op, awake, alert, oriented x 3, follows commands  Pupils 3mm and reactive, mild RNLF flattening  Power 5/5 in all extremities. Mild pronation/ curling of R hand though corrects  Decreased sensation over the RUE and RLE (baseline)  Cards: S1/S2, RRR  Respiratory: Clear, no wheeze  Abdomen: Soft, non- tender  Extremities: No edema  Skin: Warm/dry      MEDICATIONS:  acetaminophen     Tablet .. 1000 milliGRAM(s) Oral every 8 hours  acetaminophen   IVPB .. 1000 milliGRAM(s) IV Intermittent once  ceFAZolin   IVPB 2000 milliGRAM(s) IV Intermittent every 8 hours  dexAMETHasone  Injectable   IV Push   dexAMETHasone  Injectable 4 milliGRAM(s) IV Push every 6 hours  dextrose 5%. 1000 milliLiter(s) (100 mL/Hr) IV Continuous <Continuous>  dextrose 5%. 1000 milliLiter(s) (50 mL/Hr) IV Continuous <Continuous>  dextrose 50% Injectable 25 Gram(s) IV Push once  dextrose 50% Injectable 12.5 Gram(s) IV Push once  dextrose 50% Injectable 25 Gram(s) IV Push once  dextrose Oral Gel 15 Gram(s) Oral once PRN  glucagon  Injectable 1 milliGRAM(s) IntraMuscular once  HYDROmorphone  Injectable 0.5 milliGRAM(s) IV Push every 3 hours PRN  insulin lispro (ADMELOG) corrective regimen sliding scale   SubCutaneous three times a day before meals  labetalol Injectable 10 milliGRAM(s) IV Push every 2 hours PRN  levETIRAcetam 500 milliGRAM(s) Oral two times a day  ondansetron   Disintegrating Tablet 4 milliGRAM(s) Oral every 6 hours  ondansetron Injectable 4 milliGRAM(s) IV Push every 6 hours PRN  oxyCODONE    IR 5 milliGRAM(s) Oral every 4 hours PRN  pantoprazole    Tablet 40 milliGRAM(s) Oral before breakfast  polyethylene glycol 3350 17 Gram(s) Oral daily PRN  senna 2 Tablet(s) Oral at bedtime  sodium chloride 0.9%. 1000 milliLiter(s) (75 mL/Hr) IV Continuous <Continuous>  sodium chloride 0.9%. 1000 milliLiter(s) (70 mL/Hr) IV Continuous <Continuous>             =========================  NSICU ATTENDING PROGRESS NOTE  =========================    HPI: Taken from outpatient documentation  Patient is a 44 y/o right- handed female, former smoker, with no significant PMHx, found to have left frontal motor strip cystic lesion on MRI 1/20/2023 during workup for progressive right fingertip numbness for which went to [facility] and MRI showed small area of signal abnormality in L posterior frontal subcortical and deep white matter, and 3.7x1.8 cm arachnoid cyst. Numbness and weakness continued to progress to entire Right arm and Right leg occurring several times daily and an aura sensation in her Left ear. MRI on 5/30/23 showed progression leading to referral to [facility] and neurology. Started on keppra BID by Dr. Doty to r/o seizure, no improvement of symptoms. MS workup negative. Serial imaging completed which showed increase in size of lesion. Also with progression of symptoms to include right arm weakness, right leg intermittent weakness, right tongue deviation. Underwent left sided awake craniotomy for resection of brain tumor 1/26/24. 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  2/8/24 post op: Speech issues with some improvement but continues to have some paraphasic errors. Also with intermittent headaches that are improving. Right sided numbness sensations and weakness improved immediate post op and since returned to baseline pre- op.  4/11/24 Patient returned for f/u and 2 month repeat MRI review which was done 4/8 and stable. She continues to report headaches and speech issues but language improving. Recommended to repeat MRI 3 months.  7/3/24 pt returned for 3 month follow- up and review of MRI done 6/24/24 which was stable. Speech stable. Recommended 6 month follow- up.  1/29/25 pt returned for 6 month follow- up and review of 1/22/25 MRI which was stable. Recommended 6 month follow- up, continue neurology follow- up for seizure control/ AED management.   5/25/25 MRI- Ovoid region of intrinsic T1 hyperintensity and surrounding mild enhancement along the left frontoparietal opercular resection cavity appears grossly similar in size to prior study. Mild FLAIR hyperintensity surrounding the cavity is similar in extent. There is a tiny new focus of T1 hyperintense blood products medial to the cavity, in the periventricular region, with slightly more prominent FLAIR hyperintensity. Patient is currently on keppra 500 mg BID.   Presents for surgery today.  She reports continued right sided paresthesias and numbness. (07 Jul 2025 06:27)      On admission, the patient was:  GCS: 15  Hunt-Joaquin:  modified Freitas:  ICH score:  NIHSS:    *** HIGH RISK OF DVT PRESENT ON ADMISSION ***      ICU Vital Signs Last 24 Hrs  T(C): 35.9 (07 Jul 2025 08:14), Max: 35.9 (07 Jul 2025 08:14)  HR: 71 (07 Jul 2025 08:14) (71 - 71)  BP: 104/67 (07 Jul 2025 08:14) (104/67 - 104/67)  RR: 17 (07 Jul 2025 08:14) (17 - 17)  SpO2: 98% (07 Jul 2025 08:14) (98% - 98%)    EXAMINATION:  General: Calm  HEENT:  MMM  Neuro: Immediate post op, awake, alert, oriented x 3, follows commands. expressive aphasia, word finding difficulty  Pupils 3mm and reactive, mild RNLF flattening  Power 5/5 in all extremities. Mild pronation/ curling of R hand though corrects  Decreased sensation over the RUE and RLE (baseline)  Cards: S1/S2, RRR  Respiratory: Clear, no wheeze  Abdomen: Soft, non- tender  Extremities: No edema  Skin: Warm/dry      MEDICATIONS:  acetaminophen     Tablet .. 1000 milliGRAM(s) Oral every 8 hours  acetaminophen   IVPB .. 1000 milliGRAM(s) IV Intermittent once  ceFAZolin   IVPB 2000 milliGRAM(s) IV Intermittent every 8 hours  dexAMETHasone  Injectable   IV Push   dexAMETHasone  Injectable 4 milliGRAM(s) IV Push every 6 hours  dextrose 5%. 1000 milliLiter(s) (100 mL/Hr) IV Continuous <Continuous>  dextrose 5%. 1000 milliLiter(s) (50 mL/Hr) IV Continuous <Continuous>  dextrose 50% Injectable 25 Gram(s) IV Push once  dextrose 50% Injectable 12.5 Gram(s) IV Push once  dextrose 50% Injectable 25 Gram(s) IV Push once  dextrose Oral Gel 15 Gram(s) Oral once PRN  glucagon  Injectable 1 milliGRAM(s) IntraMuscular once  HYDROmorphone  Injectable 0.5 milliGRAM(s) IV Push every 3 hours PRN  insulin lispro (ADMELOG) corrective regimen sliding scale   SubCutaneous three times a day before meals  labetalol Injectable 10 milliGRAM(s) IV Push every 2 hours PRN  levETIRAcetam 500 milliGRAM(s) Oral two times a day  ondansetron   Disintegrating Tablet 4 milliGRAM(s) Oral every 6 hours  ondansetron Injectable 4 milliGRAM(s) IV Push every 6 hours PRN  oxyCODONE    IR 5 milliGRAM(s) Oral every 4 hours PRN  pantoprazole    Tablet 40 milliGRAM(s) Oral before breakfast  polyethylene glycol 3350 17 Gram(s) Oral daily PRN  senna 2 Tablet(s) Oral at bedtime  sodium chloride 0.9%. 1000 milliLiter(s) (75 mL/Hr) IV Continuous <Continuous>  sodium chloride 0.9%. 1000 milliLiter(s) (70 mL/Hr) IV Continuous <Continuous>

## 2025-07-07 NOTE — H&P ADULT - ASSESSMENT
This 45-year-old female presented for follow-up of a left frontal cystic lesion discovered in January 2023 during workup for progressive right fingertip numbness. She underwent a left awake craniotomy on January 26, 2024. 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 She now reports possible worsening seizure activity versus anxiety. MRI shows a grossly stable surgical cavity with a tiny new focus of T1 hyperintense blood products in the periventricular region and slightly more prominent FLAIR hyperintensity and slight enlargement of enhancement oftumor lesion, preop for awake craniotomy today:    - admit to [facility] icu postop    All above as per Dr. D'amico.

## 2025-07-07 NOTE — CONSULT NOTE ADULT - SUBJECTIVE AND OBJECTIVE BOX
NEUROLOGY CONSULT    HPI: 46 y/o right-handed female, former smoker with PMHx of arachnoid cyst s/p left sided awake craniotomy and resection 1/24 who presents to the hospital with persistent right sided numbness and paresthesias, recent MRI 5/25 showing ovoid region of intrinsic T1 hyperintensity with surrounding mild enhancement along the left frontoparietal opercular resection cavity. Patient is now s/p awake craniatomy as of this morning. Epilepsy consulted given pt with history of focal sensory seizure, currently on AED Keppra.  Patient seen and examined at bedside.    46 y/o female presents for surgery today.  She reports continued right sided paresthesias and numbness. (07 Jul 2025 06:27)     MEDICATIONS  Home Medications:  levETIRAcetam 500 mg oral tablet: 1 tab(s) orally 2 times a day (07 Jul 2025 07:10)    MEDICATIONS  (STANDING):  acetaminophen     Tablet .. 1000 milliGRAM(s) Oral every 8 hours  ceFAZolin   IVPB 2000 milliGRAM(s) IV Intermittent every 8 hours  dexAMETHasone  Injectable   IV Push   dexAMETHasone  Injectable 4 milliGRAM(s) IV Push every 6 hours  insulin lispro (ADMELOG) corrective regimen sliding scale   SubCutaneous three times a day before meals  levETIRAcetam 500 milliGRAM(s) Oral two times a day  pantoprazole    Tablet 40 milliGRAM(s) Oral before breakfast  senna 2 Tablet(s) Oral at bedtime  sodium phosphate 30 milliMole(s)/500 mL IVPB 30 milliMole(s) IV Intermittent once    MEDICATIONS  (PRN):  HYDROmorphone  Injectable 0.5 milliGRAM(s) IV Push every 3 hours PRN breakthrough pain  ondansetron Injectable 4 milliGRAM(s) IV Push every 6 hours PRN Nausea and/or Vomiting  oxyCODONE    IR 5 milliGRAM(s) Oral every 4 hours PRN Severe Pain (7 - 10)  polyethylene glycol 3350 17 Gram(s) Oral daily PRN Constipation    FAMILY HISTORY:  FH: aneurysm (Mother)    Family history of CVA (Mother)    SOCIAL HISTORY: negative for tobacco, alcohol, or ilicit drug use.    Allergies    Tylox (Other)    Intolerances    GEN: NAD, pleasant, cooperative    NEURO:   General: Appearance is consistent with chronologic age.   Cognitive/Language: Awake, alert, and oriented to person, place, time and date.  Recent and remote memory intact.  Fund of knowledge is appropriate.  Naming, repetition and comprehension intact. Nondysarthric.    Cranial Nerves  - Eyes: Visual acuity intact, Visual fields full.  EOMI w/o nystagmus, skew or reported double vision.  PERRL.  No ptosis/weakness of eyelid closure.    - Face: Facial sensation normal V1 - 3, no facial asymmetry.    - Ears/Nose/Throat: Hearing grossly intact b/l to finger rub.  Palate elevates midline.  Tongue and uvula midline.   Motor exam: Normal tone and bulk. No tenderness, twitching, tremors or involuntary movements.  Strength Test: 5/5 b/l UE and LE   Sensory examination: Intact to light touch and pinprick, pain, temperature and proprioception and vibration in all extremities.  Reflexes: 2+ b/l biceps, triceps, patella and achilles.  Plantar response downgoing b/l.  Marti, clonus absent.  Cerebellum: FTN/HKS intact. No dysmetria.    Gait narrow based and normal.    LABS:                        11.3   5.72  )-----------( 290      ( 07 Jul 2025 12:54 )             34.3     07-07    137  |  106  |  14  ----------------------------<  135[H]  4.2   |  21[L]  |  0.61    Ca    8.9      07 Jul 2025 12:54  Phos  2.4     07-07  Mg     1.8     07-07      Hemoglobin A1C:   Vitamin B12   PT/INR - ( 07 Jul 2025 12:54 )   PT: 10.4 sec;   INR: 0.90        PTT - ( 07 Jul 2025 12:54 )  PTT:26.0 sec  CAPILLARY BLOOD GLUCOSE    Urinalysis Basic - ( 07 Jul 2025 12:54 )    Color: x / Appearance: x / SG: x / pH: x  Gluc: 135 mg/dL / Ketone: x  / Bili: x / Urobili: x   Blood: x / Protein: x / Nitrite: x   Leuk Esterase: x / RBC: x / WBC x   Sq Epi: x / Non Sq Epi: x / Bacteria: x    Microbiology:    RADIOLOGY, EKG AND ADDITIONAL TESTS: Reviewed.         NEUROLOGY CONSULT    HPI: 44 y/o right-handed female, former smoker with PMHx of left frontal arachnoid cyst s/p craniotomy and resection 1/2024 who presents to the hospital with persistent right sided numbness and paresthesias, recent MRI 5/25 showing T1 hyperintensity with surrounding mild enhancement along the left frontoparietal opercular resection cavity. Patient is now s/p awake craniotomy of this morning (7/7/25). 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    Patient seen and examined at bedside. Patient is pleasant and AAOx3. She reports an ongoing headache and dysarthria since her operation this morning. She also endorses ongoing weakness on the right side of her body but reports no right-sided pain which was previously present. She reports one seizure episode while recovering from the operation this morning, which presented as numbness on the right side of her body. Upon review of the patient's medical history, they report that their symptoms began in 8/2022 with episodes of right arm weakness occurring 1x week for ~20s. She also observes that these episodes have historically been triggered by a changes in temperature (eg. touching a hot phone). Of note, the patient was previously tried on an increased dose of Keppra (750mg bid) but was unable to tolerate due to side effects of anxiety; she was therefore put back on her current dose of 500mg Keppra.     MEDICATIONS  Home Medications:  levETIRAcetam 500 mg oral tablet: 1 tab(s) orally 2 times a day (07 Jul 2025 07:10)    MEDICATIONS  (STANDING):  acetaminophen     Tablet .. 1000 milliGRAM(s) Oral every 8 hours  ceFAZolin   IVPB 2000 milliGRAM(s) IV Intermittent every 8 hours  dexAMETHasone  Injectable   IV Push   dexAMETHasone  Injectable 4 milliGRAM(s) IV Push every 6 hours  insulin lispro (ADMELOG) corrective regimen sliding scale   SubCutaneous three times a day before meals  levETIRAcetam 500 milliGRAM(s) Oral two times a day  pantoprazole    Tablet 40 milliGRAM(s) Oral before breakfast  senna 2 Tablet(s) Oral at bedtime  sodium phosphate 30 milliMole(s)/500 mL IVPB 30 milliMole(s) IV Intermittent once    MEDICATIONS  (PRN):  HYDROmorphone  Injectable 0.5 milliGRAM(s) IV Push every 3 hours PRN breakthrough pain  ondansetron Injectable 4 milliGRAM(s) IV Push every 6 hours PRN Nausea and/or Vomiting  oxyCODONE    IR 5 milliGRAM(s) Oral every 4 hours PRN Severe Pain (7 - 10)  polyethylene glycol 3350 17 Gram(s) Oral daily PRN Constipation    FAMILY HISTORY:  FH: aneurysm (Mother)    Family history of CVA (Mother)    SOCIAL HISTORY: negative for tobacco, alcohol, or ilicit drug use.    Allergies    Tylox (Other)    Intolerances    GEN: NAD, pleasant, cooperative    NEURO:   General: Appearance is consistent with chronologic age.   Cognitive/Language: Awake, alert, and oriented to person, place, time and date.  Recent and remote memory intact.  Fund of knowledge is appropriate.  Naming, repetition and comprehension intact. Dysarthric speech.    Cranial Nerves  - Eyes: Visual acuity intact, Visual fields full.  EOMI w/o nystagmus, skew or reported double vision.  PERRL.  No ptosis/weakness of eyelid closure.    - Face: Facial sensation V1-V3 decreased on the right side. No facial asymmetry.    - Ears/Nose/Throat: Hearing grossly intact b/l to finger rub.  Palate elevates midline.  Tongue and uvula midline.   Motor exam: Normal tone and bulk. No tenderness, twitching, tremors or involuntary movements.  Strength Test: 4/5 strength on the RUE. 5/5 strength in the LUE and b/l lower extremities  Sensory examination: Sensory dull on the right side to light touch and temperature. Intact proprioception and vibration in all extremities.   Reflexes: 2+ b/l biceps, triceps, patella and achilles.  Plantar response downgoing b/l.  Marti, clonus absent.  Cerebellum: FTN/HKS intact. No dysmetria.    Gait: Not assessed    LABS:                        11.3   5.72  )-----------( 290      ( 07 Jul 2025 12:54 )             34.3     07-07    137  |  106  |  14  ----------------------------<  135[H]  4.2   |  21[L]  |  0.61    Ca    8.9      07 Jul 2025 12:54  Phos  2.4     07-07  Mg     1.8     07-07      Hemoglobin A1C:   Vitamin B12   PT/INR - ( 07 Jul 2025 12:54 )   PT: 10.4 sec;   INR: 0.90        PTT - ( 07 Jul 2025 12:54 )  PTT:26.0 sec  CAPILLARY BLOOD GLUCOSE    Urinalysis Basic - ( 07 Jul 2025 12:54 )    Color: x / Appearance: x / SG: x / pH: x  Gluc: 135 mg/dL / Ketone: x  / Bili: x / Urobili: x   Blood: x / Protein: x / Nitrite: x   Leuk Esterase: x / RBC: x / WBC x   Sq Epi: x / Non Sq Epi: x / Bacteria: x    Microbiology:    RADIOLOGY, EKG AND ADDITIONAL TESTS: Reviewed.         NEUROLOGY CONSULT    HPI: 44 y/o right-handed female, former smoker with PMHx of left frontal arachnoid cyst s/p craniotomy and resection 1/2024 who presents to the hospital with persistent right sided numbness and paresthesias, recent MRI 5/25 showing T1 hyperintensity with surrounding mild enhancement along the left frontoparietal opercular resection cavity. Patient is now s/p awake craniotomy of this morning. 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    Patient seen and examined at bedside. Patient is pleasant and AAOx3. She reports an ongoing headache and dysarthria since her operation this morning. She also endorses ongoing weakness on the right side of her body but reports no right-sided pain which was previously present. She reports one seizure episode while recovering from the operation this morning, which presented as numbness on the right side of her body. Upon review of the patient's medical history, they report that their symptoms began in 8/2022 with episodes of right arm weakness occurring 1x week for ~20s. She also observes that these episodes have historically been triggered by a changes in temperature (eg. touching a hot phone). Of note, the patient was previously tried on an increased dose of Keppra (750mg bid) but was unable to tolerate due to side effects of anxiety; she was therefore put back on her current dose of 500mg Keppra.     MEDICATIONS  Home Medications:  levETIRAcetam 500 mg oral tablet: 1 tab(s) orally 2 times a day (07 Jul 2025 07:10)    MEDICATIONS  (STANDING):  acetaminophen     Tablet .. 1000 milliGRAM(s) Oral every 8 hours  ceFAZolin   IVPB 2000 milliGRAM(s) IV Intermittent every 8 hours  dexAMETHasone  Injectable   IV Push   dexAMETHasone  Injectable 4 milliGRAM(s) IV Push every 6 hours  insulin lispro (ADMELOG) corrective regimen sliding scale   SubCutaneous three times a day before meals  levETIRAcetam 500 milliGRAM(s) Oral two times a day  pantoprazole    Tablet 40 milliGRAM(s) Oral before breakfast  senna 2 Tablet(s) Oral at bedtime  sodium phosphate 30 milliMole(s)/500 mL IVPB 30 milliMole(s) IV Intermittent once    MEDICATIONS  (PRN):  HYDROmorphone  Injectable 0.5 milliGRAM(s) IV Push every 3 hours PRN breakthrough pain  ondansetron Injectable 4 milliGRAM(s) IV Push every 6 hours PRN Nausea and/or Vomiting  oxyCODONE    IR 5 milliGRAM(s) Oral every 4 hours PRN Severe Pain (7 - 10)  polyethylene glycol 3350 17 Gram(s) Oral daily PRN Constipation    FAMILY HISTORY:  FH: aneurysm (Mother)    Family history of CVA (Mother)    SOCIAL HISTORY: negative for tobacco, alcohol, or ilicit drug use.    Allergies    Tylox (Other)    Intolerances    GEN: NAD, pleasant, cooperative    NEURO:   General: Appearance is consistent with chronologic age.   Cognitive/Language: Awake, alert, and oriented to person, place, time and date.  Recent and remote memory intact.  Fund of knowledge is appropriate.  Naming, repetition and comprehension intact. Dysarthric speech.    Cranial Nerves  - Eyes: Visual acuity intact, Visual fields full.  EOMI w/o nystagmus, skew or reported double vision.  PERRL.  No ptosis/weakness of eyelid closure.    - Face: Facial sensation V1-V3 decreased on the right side. No facial asymmetry.    - Ears/Nose/Throat: Hearing grossly intact b/l to finger rub.  Palate elevates midline.  Tongue and uvula midline.   Motor exam: Normal tone and bulk. No tenderness, twitching, tremors or involuntary movements.  Strength Test: 4/5 strength on the RUE. 5/5 strength in the LUE and b/l lower extremities  Sensory examination: Sensory dull on the right side to light touch and temperature. Intact proprioception and vibration in all extremities.   Reflexes: 2+ b/l biceps, triceps, patella and achilles.  Plantar response downgoing b/l.  Marti, clonus absent.  Cerebellum: FTN/HKS intact. No dysmetria.    Gait: Not assessed    LABS:                        11.3   5.72  )-----------( 290      ( 07 Jul 2025 12:54 )             34.3     07-07    137  |  106  |  14  ----------------------------<  135[H]  4.2   |  21[L]  |  0.61    Ca    8.9      07 Jul 2025 12:54  Phos  2.4     07-07  Mg     1.8     07-07      Hemoglobin A1C:   Vitamin B12   PT/INR - ( 07 Jul 2025 12:54 )   PT: 10.4 sec;   INR: 0.90        PTT - ( 07 Jul 2025 12:54 )  PTT:26.0 sec  CAPILLARY BLOOD GLUCOSE    Urinalysis Basic - ( 07 Jul 2025 12:54 )    Color: x / Appearance: x / SG: x / pH: x  Gluc: 135 mg/dL / Ketone: x  / Bili: x / Urobili: x   Blood: x / Protein: x / Nitrite: x   Leuk Esterase: x / RBC: x / WBC x   Sq Epi: x / Non Sq Epi: x / Bacteria: x    Microbiology:    RADIOLOGY, EKG AND ADDITIONAL TESTS: Reviewed.         NEUROLOGY CONSULT    HPI: 46 y/o right-handed female, former smoker with PMHx of left frontal cystic lesion s/p craniotomy and resection 1/2024 who presents to the hospital with persistent right sided numbness and paresthesias, recent MRI 5/25 showing T1 hyperintensity with surrounding mild enhancement along the left frontoparietal opercular resection cavity. Patient is now s/p awake craniotomy of this morning. 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    Patient seen and examined at bedside. Patient is pleasant and AAOx3. She reports an ongoing headache and dysarthria since her operation this morning. She also endorses ongoing weakness on the right side of her body but reports no right-sided pain which was previously present. She reports one seizure episode while recovering from the operation this morning, which presented as numbness on the right side of her body. Upon review of the patient's medical history, they report that their symptoms began in 8/2022 with episodes of right arm weakness occurring 1x week for ~20s. She also observes that these episodes have historically been triggered by a changes in temperature (eg. touching a hot phone). Of note, the patient was previously tried on an increased dose of Keppra (750mg bid) but was unable to tolerate due to side effects of anxiety; she was therefore put back on her current dose of 500mg Keppra.     MEDICATIONS  Home Medications:  levETIRAcetam 500 mg oral tablet: 1 tab(s) orally 2 times a day (07 Jul 2025 07:10)    MEDICATIONS  (STANDING):  acetaminophen     Tablet .. 1000 milliGRAM(s) Oral every 8 hours  ceFAZolin   IVPB 2000 milliGRAM(s) IV Intermittent every 8 hours  dexAMETHasone  Injectable   IV Push   dexAMETHasone  Injectable 4 milliGRAM(s) IV Push every 6 hours  insulin lispro (ADMELOG) corrective regimen sliding scale   SubCutaneous three times a day before meals  levETIRAcetam 500 milliGRAM(s) Oral two times a day  pantoprazole    Tablet 40 milliGRAM(s) Oral before breakfast  senna 2 Tablet(s) Oral at bedtime  sodium phosphate 30 milliMole(s)/500 mL IVPB 30 milliMole(s) IV Intermittent once    MEDICATIONS  (PRN):  HYDROmorphone  Injectable 0.5 milliGRAM(s) IV Push every 3 hours PRN breakthrough pain  ondansetron Injectable 4 milliGRAM(s) IV Push every 6 hours PRN Nausea and/or Vomiting  oxyCODONE    IR 5 milliGRAM(s) Oral every 4 hours PRN Severe Pain (7 - 10)  polyethylene glycol 3350 17 Gram(s) Oral daily PRN Constipation    FAMILY HISTORY:  FH: aneurysm (Mother)    Family history of CVA (Mother)    SOCIAL HISTORY: negative for tobacco, alcohol, or ilicit drug use.    Allergies    Tylox (Other)    Intolerances    GEN: NAD, pleasant, cooperative    NEURO:   General: Appearance is consistent with chronologic age.   Cognitive/Language: Awake, alert, and oriented to person, place, time and date.  Recent and remote memory intact.  Fund of knowledge is appropriate.  Naming, repetition and comprehension intact. Dysarthric speech.    Cranial Nerves  - Eyes: Visual acuity intact, Visual fields full.  EOMI w/o nystagmus, skew or reported double vision.  PERRL.  No ptosis/weakness of eyelid closure.    - Face: Facial sensation V1-V3 decreased on the right side. No facial asymmetry.    - Ears/Nose/Throat: Hearing grossly intact b/l to finger rub.  Palate elevates midline.  Tongue and uvula midline.   Motor exam: Normal tone and bulk. No tenderness, twitching, tremors or involuntary movements.  Strength Test: 4/5 strength on the RUE. 5/5 strength in the LUE and b/l lower extremities  Sensory examination: Sensory dull on the right side to light touch and temperature. Intact proprioception and vibration in all extremities.   Reflexes: 2+ b/l biceps, triceps, patella and achilles.  Plantar response downgoing b/l.  Marti, clonus absent.  Cerebellum: FTN/HKS intact. No dysmetria.    Gait: Not assessed    LABS:                        11.3   5.72  )-----------( 290      ( 07 Jul 2025 12:54 )             34.3     07-07    137  |  106  |  14  ----------------------------<  135[H]  4.2   |  21[L]  |  0.61    Ca    8.9      07 Jul 2025 12:54  Phos  2.4     07-07  Mg     1.8     07-07      Hemoglobin A1C:   Vitamin B12   PT/INR - ( 07 Jul 2025 12:54 )   PT: 10.4 sec;   INR: 0.90        PTT - ( 07 Jul 2025 12:54 )  PTT:26.0 sec  CAPILLARY BLOOD GLUCOSE    Urinalysis Basic - ( 07 Jul 2025 12:54 )    Color: x / Appearance: x / SG: x / pH: x  Gluc: 135 mg/dL / Ketone: x  / Bili: x / Urobili: x   Blood: x / Protein: x / Nitrite: x   Leuk Esterase: x / RBC: x / WBC x   Sq Epi: x / Non Sq Epi: x / Bacteria: x    Microbiology:    RADIOLOGY, EKG AND ADDITIONAL TESTS: Reviewed.

## 2025-07-07 NOTE — PROGRESS NOTE ADULT - ASSESSMENT
ASSESSMENT AND PLAN   46 yo F PMH left frontal cystic lesion discovered in January 2023 during workup for progressive right fingertip numbness. She underwent a left awake craniotomy on 1/26/24, path: glial-neuronal tumor. She now reports possible worsening seizure activity vs anxiety. MRI +grossly stable surgical cavity with new focus of T1 hyperintense blood products in the periventricular region and slightly more prominent FLAIR hyperintensity and slight enlargement of enhancement of tumor. Now s/p L awake craniotomy for resection, frozen: glial cell origin (7/7/25)    Neuro:  - neuro.vitals q1h  - pain control: cranial ERAS  - seziure tx: continue home keppra 500mg bid   - epilepsy consulted, f/u recs   - dex taper 1 week to off  - post op MRI pending    Cardiac:  - SBP goal: 100-140    Pulm:  - RA    GI:  - regular diet  - bowel regimen  - gi ppx protonix while on steroids     Renal:  - IVL    Endo:  - ISS    Heme:  - SCDs    ID:  - post op ancef    DISPO: NSICU, full code, pending PT/OT   ASSESSMENT AND PLAN   46 yo F PMH left frontal cystic lesion discovered in January 2023 during workup for progressive right fingertip numbness. She underwent a left awake craniotomy on 1/26/24, path: glial-neuronal tumor. She now reports possible worsening seizure activity vs anxiety. MRI +grossly stable surgical cavity with new focus of T1 hyperintense blood products in the periventricular region and slightly more prominent FLAIR hyperintensity and slight enlargement of enhancement of tumor. Now s/p L awake craniotomy for resection, frozen: glial cell origin (7/7/25)    Neuro - plan per Neurosurgery   - neuro vitals q1  - pain control: cranial ERAS  - seziure tx: continue home Keppra 500mg bid   - epilepsy consulted, f/u recs  - dex taper 1 week to off  - post op MRI pending    Cardiac:  - SBP goal: 100-140      Pulm:  - RA    GI:  - regular diet  - bowel regimen  - gi ppx protonix while on steroids     Renal:  - IVF   - bolus 1 liter NS  - replete lytes PRN    Endo:  - ISS    Heme:  - SCDs    ID:  - post op ancef    DISPO: NSICU, full code, pending PT/OT

## 2025-07-07 NOTE — H&P ADULT - NSHPPHYSICALEXAM_GEN_ALL_CORE
Constitutional: 44 y/o  female awake, alert in no acute distress.  Eyes:  Sclera anicteric, conjunctiva noninjected.  ENMT: Oropharyngeal mucosa moist, pink. Tongue midline.    Neck: Neck supple, FROM.  No appreciable lymphadenopathy.  Back:  No pain to palpation/percussion of low back. No CVA tenderness.  Respiratory: Clear to auscultation bilaterally.  No rales, rhonchi, wheezes.  Cardiovascular: Regular rate and rhythm.  S1, S2 heard.  Gastrointestinal:  Soft, nontender, nondistended.  +BS.  Genitourinary:  Deferred.  Rectal: Deferred.  Vascular: Extremities warm, no ulcers, no discoloration of skin.   Neurological: Gen: AA&O x 3, conversant, appropriate.      CN II-XII grossly intact.    Motor: GRAY x 4, 5/5 throughout UE/LE.    Sens: Sensation intact to light touch throughout.     Plantar downgoing bilaterally.  No clonus.      No pronator drift, no dysmetria.  Skin: Warm, dry, no erythema. Constitutional: 44 y/o  female awake, alert in no acute distress.  Eyes:  Sclera anicteric, conjunctiva noninjected.  ENMT: Oropharyngeal mucosa moist, pink. Tongue midline.    Neck: Neck supple, FROM.  No appreciable lymphadenopathy.  Back:  No pain to palpation/percussion of low back. No CVA tenderness.  Respiratory: Clear to auscultation bilaterally.  No rales, rhonchi, wheezes.  Cardiovascular: Regular rate and rhythm.  S1, S2 heard.  Gastrointestinal:  Soft, nontender, nondistended.  +BS.  Genitourinary:  Deferred.  Rectal: Deferred.  Vascular: Extremities warm, no ulcers, no discoloration of skin.   Neurological: Gen: AA&O x 3, conversant, appropriate.      CN II-XII grossly intact.    Motor: GRAY x 4, 5/5 throughout UE/LE.    Sens: Sensation intact to light touch throughout on left side, decreased on right ue/le throughout to light touch.     Plantar downgoing bilaterally.  No clonus.      No pronator drift, no dysmetria.  Skin: Warm, dry, no erythema.

## 2025-07-07 NOTE — PROGRESS NOTE ADULT - SUBJECTIVE AND OBJECTIVE BOX
NEUROCRITICAL CARE ATTENDING NOTE  44 yo F PMH left frontal cystic lesion discovered in January 2023 during workup for progressive right fingertip numbness. She underwent a left awake craniotomy on 1/26/24, path: glial-neuronal tumor. She now reports possible worsening seizure activity vs anxiety. MRI +grossly stable surgical cavity with new focus of T1 hyperintense blood products in the periventricular region and slightly more prominent FLAIR hyperintensity and slight enlargement of enhancement of tumor. Now s/p L awake craniotomy for resection, frozen: glial cell origin (7/7/25)    Hospital Course/Interval Events  7/7: POD0 L awake crani tumor resection, frozen: glial cell origin. epilepsy consulted.      PHYSICAL EXAM  ICU Vital Signs Last 24 Hrs  T(C): 36.3 (07 Jul 2025 18:11), Max: 36.6 (07 Jul 2025 13:48)  T(F): 97.4 (07 Jul 2025 18:11), Max: 97.8 (07 Jul 2025 13:48)  HR: 64 (07 Jul 2025 19:00) (49 - 96)  BP: 105/59 (07 Jul 2025 19:00) (94/62 - 106/64)  BP(mean): 79 (07 Jul 2025 19:00) (73 - 80)  ABP: 106/65 (07 Jul 2025 19:00) (98/72 - 126/75)  ABP(mean): 82 (07 Jul 2025 19:00) (75 - 97)  RR: 17 (07 Jul 2025 19:00) (17 - 19)  SpO2: 96% (07 Jul 2025 19:00) (92% - 99%)  GEN: young woman laying in bed, NAD  CV: RRR +S1/S2  PULM: CTAB  GI: Abd soft, NT/ND  EXT: ext warm, dry, nontender  WOUND/DRAINS: headwrap in place c/d/i  NEURO:   Mental status: AOx3. FC, OE spont, +expressive aphasia  CNs: mild R nasolabial fold flattening.  PERRL, EOMI.  Motor:  +mild RUE drift. 5/5 strength throughout.  Sensory: mild decreased sensation RUE o/w SILT.     IMAGING AND LABS REVIEWED   NEUROCRITICAL CARE ATTENDING NOTE  44 yo F PMH left frontal cystic lesion discovered in January 2023 during workup for progressive right fingertip numbness. She underwent a left awake craniotomy on 1/26/24, path: glial-neuronal tumor. She now reports possible worsening seizure activity vs anxiety. MRI +grossly stable surgical cavity with new focus of T1 hyperintense blood products in the periventricular region and slightly more prominent FLAIR hyperintensity and slight enlargement of enhancement of tumor. Now s/p L awake craniotomy for resection, frozen: glial cell origin (7/7/25)    Hospital Course/Interval Events  7/7: POD0 L awake crani tumor resection, frozen: glial cell origin. epilepsy consulted.      PHYSICAL EXAM  ICU Vital Signs Last 24 Hrs  T(C): 36.3 (07 Jul 2025 18:11), Max: 36.6 (07 Jul 2025 13:48)  T(F): 97.4 (07 Jul 2025 18:11), Max: 97.8 (07 Jul 2025 13:48)  HR: 64 (07 Jul 2025 19:00) (49 - 96)  BP: 105/59 (07 Jul 2025 19:00) (94/62 - 106/64)  BP(mean): 79 (07 Jul 2025 19:00) (73 - 80)  ABP: 106/65 (07 Jul 2025 19:00) (98/72 - 126/75)  ABP(mean): 82 (07 Jul 2025 19:00) (75 - 97)  RR: 17 (07 Jul 2025 19:00) (17 - 19)  SpO2: 96% (07 Jul 2025 19:00) (92% - 99%)  GEN: young woman laying in bed, awake   CV: RRR +S1/S2  PULM: CTAB  GI: Abd soft, NT/ND  EXT: ext warm, dry, nontender  WOUND/DRAINS: headwrap in place c/d/i  NEURO:   Mental status: awake, alert, oriented to self, place and time, has speech hesitancy, words finding difficulty, can name and repeat with delay, can read   CNs: right facial weakness, right facial sensation decreased, pupils equally round and reactive to light, EOMI, VFF, tongue midline  Motor:  right upper extremity pronator drift, decreased R hand dexterity, 4+/5; otherwise LUE and bilateral LE 5/5 no drift  Sensory: mild decreased sensation RUE o/w SILT.     IMAGING AND LABS REVIEWED

## 2025-07-07 NOTE — CONSULT NOTE ADULT - ASSESSMENT
44 y/o right-handed female, former smoker with PMHx of arachnoid cyst s/p left sided awake craniotomy and resection 1/24 who presents to the hospital with persistent right sided numbness and paresthesias, recent MRI 5/25 showing ovoid region of intrinsic T1 hyperintensity with surrounding mild enhancement along the left frontoparietal opercular resection cavity. Patient is now s/p awake craniatomy as of this morning. Epilepsy consulted given pt with history of focal sensory seizure, currently on AED Keppra.    Recommendations:   HPI: 46 y/o right-handed female, former smoker with PMHx of left frontal arachnoid cyst s/p craniotomy and resection 1/2024 who presents to the hospital with persistent right sided numbness and paresthesias, recent MRI 5/25 showing T1 hyperintensity with surrounding mild enhancement along the left frontoparietal opercular resection cavity. Patient is now s/p awake craniotomy of this morning. 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    Recommendations:  1. Continue Keppra 500mg bid  2. EEG monitoring when able (coordinate with NSICU team) HPI: 46 y/o right-handed female, former smoker with PMHx of left frontal arachnoid cyst s/p craniotomy and resection 1/2024 who presents to the hospital with persistent right sided numbness and paresthesias, recent MRI 5/25 showing T1 hyperintensity with surrounding mild enhancement along the left frontoparietal opercular resection cavity. Patient is now s/p awake craniotomy of this morning. 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    Recommendations:  - Continue Keppra 500mg bid  - EEG monitoring when able (coordinate with NSICU team)    *Case discussed with Dr. Bower HPI: 44 y/o right-handed female, former smoker with PMHx of left frontal arachnoid cyst s/p craniotomy and resection 1/2024 who presents to the hospital with persistent right sided numbness and paresthesias, recent MRI 5/25 showing T1 hyperintensity with surrounding mild enhancement along the left frontoparietal opercular resection cavity. Patient is now s/p awake craniotomy of this morning. 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 On exam, right sided sensory deficits and 4/5 RUE noted. No abnormal movements or seizure like episodes reported so far.     Recommendations:  - Continue Keppra 500mg bid  - EEG monitoring when able to per primary team.     *Case discussed with Dr. Bower HPI: 44 y/o right-handed female, former smoker with PMHx of left frontal arachnoid cyst s/p craniotomy and resection 1/2024 who presents to the hospital with persistent right sided numbness and paresthesias, recent MRI 5/25 showing T1 hyperintensity with surrounding mild enhancement along the left frontoparietal opercular resection cavity. Patient is now s/p awake craniotomy of this morning. 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 On exam, right sided sensory deficits and 4/5 RUE noted. No abnormal movements or seizure like episodes reported so far.     Recommendations:  - Continue Keppra 500mg bid  - EEG monitoring when able to per primary team.   - Seizure precautions    *Case discussed with Dr. Bower HPI: 44 y/o right-handed female, former smoker with PMHx of left frontal cystic lesion s/p craniotomy and resection 1/2024 who presents to the hospital with persistent right sided numbness and paresthesias, recent MRI 5/25 showing T1 hyperintensity with surrounding mild enhancement along the left frontoparietal opercular resection cavity. Patient is now s/p awake craniotomy of this morning. 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 On exam, right sided sensory deficits and 4/5 RUE noted. No abnormal movements or seizure like episodes reported so far.     Recommendations:  - Continue Keppra 500mg bid  - EEG monitoring when able to per primary team.   - Seizure precautions    *Case discussed with Dr. Bower

## 2025-07-07 NOTE — PROGRESS NOTE ADULT - ASSESSMENT
ASSESSMENT  45-year-old female with prior left awake craniotomy on 1/26/24.  MRI shows a grossly stable surgical cavity with a tiny new focus of T1 hyperintense blood products in the periventricular region and slightly more prominent FLAIR hyperintensity and slight enlargement of enhancement of tumor lesion.  Now POD 0 s/p awake craniotomy for resection (7/7/25)    PLAN  NEURO:  Neurochecks Q1h  Analgesia: cranial ERAS  Seizure treatment: Continue Keppra 500mg bid  Cerebral edema: Decadron with taper  Post op MRI  Activity: [x] mobilize as tolerated [] Bedrest [x] PT [x] OT [] PMNR    CARDS:  SBP goal: 100-140    PULM:  Room air  Incentive spirometry as tolerated    GI:  Diet:  GI prophylaxis [] not indicated [] PPI [] other:  Bowel regimen [] colace [] senna [] other:    RENAL  NS at 75cc/hr  Monitor Is/Os  Replete lytes prn    ENDO:   Goal euglycemia (-180)    HEME/ONC:  VTE prophylaxis: [x] SCDs [] chemoprophylaxis [x] hold chemoprophylaxis due to: fresh post op    ID:  Afebrile    MISC:    SOCIAL/FAMILY:  [x] awaiting [] updated at bedside [] family meeting    CODE STATUS:  [x] Full Code [] DNR [] DNI [] Palliative/Comfort Care    DISPOSITION:  [x] ICU [] Stroke Unit [] Floor [] EMU [] RCU [] PCU    Time spent: 60 critical care minutes       ASSESSMENT  45-year-old female with prior left awake craniotomy on 1/26/24.  MRI shows a grossly stable surgical cavity with a tiny new focus of T1 hyperintense blood products in the periventricular region and slightly more prominent FLAIR hyperintensity and slight enlargement of enhancement of tumor lesion.  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 IVL once tolerating  Monitor Is/Os  Replete lytes prn    ENDO:   Goal euglycemia (-180)    HEME/ONC:  Monitor H/H  VTE prophylaxis: [x] SCDs [] chemoprophylaxis [x] hold chemoprophylaxis due to: fresh post op    ID:  Afebrile    MISC:    SOCIAL/FAMILY:  [x] awaiting [] updated at bedside [] family meeting    CODE STATUS:  [x] Full Code [] DNR [] DNI [] Palliative/Comfort Care    DISPOSITION:  [x] ICU [] Stroke Unit [] Floor [] EMU [] RCU [] PCU    Time spent: 60 critical care minutes

## 2025-07-08 ENCOUNTER — TRANSCRIPTION ENCOUNTER (OUTPATIENT)
Age: 45
End: 2025-07-08

## 2025-07-08 LAB
A1C WITH ESTIMATED AVERAGE GLUCOSE RESULT: 5.2 % — SIGNIFICANT CHANGE UP (ref 4–5.6)
ANION GAP SERPL CALC-SCNC: 11 MMOL/L — SIGNIFICANT CHANGE UP (ref 5–17)
BUN SERPL-MCNC: 11 MG/DL — SIGNIFICANT CHANGE UP (ref 7–23)
CALCIUM SERPL-MCNC: 8.8 MG/DL — SIGNIFICANT CHANGE UP (ref 8.4–10.5)
CHLORIDE SERPL-SCNC: 107 MMOL/L — SIGNIFICANT CHANGE UP (ref 96–108)
CO2 SERPL-SCNC: 20 MMOL/L — LOW (ref 22–31)
CREAT SERPL-MCNC: 0.6 MG/DL — SIGNIFICANT CHANGE UP (ref 0.5–1.3)
EGFR: 111 ML/MIN/1.73M2 — SIGNIFICANT CHANGE UP
EGFR: 111 ML/MIN/1.73M2 — SIGNIFICANT CHANGE UP
ESTIMATED AVERAGE GLUCOSE: 103 MG/DL — SIGNIFICANT CHANGE UP (ref 68–114)
GLUCOSE SERPL-MCNC: 223 MG/DL — HIGH (ref 70–99)
HCT VFR BLD CALC: 33.2 % — LOW (ref 34.5–45)
HGB BLD-MCNC: 11.2 G/DL — LOW (ref 11.5–15.5)
MAGNESIUM SERPL-MCNC: 2.1 MG/DL — SIGNIFICANT CHANGE UP (ref 1.6–2.6)
MCHC RBC-ENTMCNC: 30.9 PG — SIGNIFICANT CHANGE UP (ref 27–34)
MCHC RBC-ENTMCNC: 33.7 G/DL — SIGNIFICANT CHANGE UP (ref 32–36)
MCV RBC AUTO: 91.5 FL — SIGNIFICANT CHANGE UP (ref 80–100)
NRBC # BLD AUTO: 0 K/UL — SIGNIFICANT CHANGE UP (ref 0–0)
NRBC # FLD: 0 K/UL — SIGNIFICANT CHANGE UP (ref 0–0)
NRBC BLD AUTO-RTO: 0 /100 WBCS — SIGNIFICANT CHANGE UP (ref 0–0)
PHOSPHATE SERPL-MCNC: 2.4 MG/DL — LOW (ref 2.5–4.5)
PLATELET # BLD AUTO: 301 K/UL — SIGNIFICANT CHANGE UP (ref 150–400)
PMV BLD: 10.2 FL — SIGNIFICANT CHANGE UP (ref 7–13)
POTASSIUM SERPL-MCNC: 4.3 MMOL/L — SIGNIFICANT CHANGE UP (ref 3.5–5.3)
POTASSIUM SERPL-SCNC: 4.3 MMOL/L — SIGNIFICANT CHANGE UP (ref 3.5–5.3)
RBC # BLD: 3.63 M/UL — LOW (ref 3.8–5.2)
RBC # FLD: 11.9 % — SIGNIFICANT CHANGE UP (ref 10.3–14.5)
SODIUM SERPL-SCNC: 138 MMOL/L — SIGNIFICANT CHANGE UP (ref 135–145)
WBC # BLD: 15.29 K/UL — HIGH (ref 3.8–10.5)
WBC # FLD AUTO: 15.29 K/UL — HIGH (ref 3.8–10.5)

## 2025-07-08 PROCEDURE — 85610 PROTHROMBIN TIME: CPT

## 2025-07-08 PROCEDURE — 83036 HEMOGLOBIN GLYCOSYLATED A1C: CPT

## 2025-07-08 PROCEDURE — 84100 ASSAY OF PHOSPHORUS: CPT

## 2025-07-08 PROCEDURE — 83735 ASSAY OF MAGNESIUM: CPT

## 2025-07-08 PROCEDURE — 82962 GLUCOSE BLOOD TEST: CPT

## 2025-07-08 PROCEDURE — 36415 COLL VENOUS BLD VENIPUNCTURE: CPT

## 2025-07-08 PROCEDURE — 86850 RBC ANTIBODY SCREEN: CPT

## 2025-07-08 PROCEDURE — 70553 MRI BRAIN STEM W/O & W/DYE: CPT | Mod: 26

## 2025-07-08 PROCEDURE — 86901 BLOOD TYPING SEROLOGIC RH(D): CPT

## 2025-07-08 PROCEDURE — 86900 BLOOD TYPING SEROLOGIC ABO: CPT

## 2025-07-08 PROCEDURE — 85730 THROMBOPLASTIN TIME PARTIAL: CPT

## 2025-07-08 PROCEDURE — 80048 BASIC METABOLIC PNL TOTAL CA: CPT

## 2025-07-08 PROCEDURE — 99232 SBSQ HOSP IP/OBS MODERATE 35: CPT | Mod: GC

## 2025-07-08 PROCEDURE — 85027 COMPLETE CBC AUTOMATED: CPT

## 2025-07-08 RX ORDER — SOD PHOS DI, MONO/K PHOS MONO 250 MG
2 TABLET ORAL ONCE
Refills: 0 | Status: COMPLETED | OUTPATIENT
Start: 2025-07-08 | End: 2025-07-08

## 2025-07-08 RX ORDER — POLYETHYLENE GLYCOL 3350 17 G/17G
17 POWDER, FOR SOLUTION ORAL
Qty: 238 | Refills: 0
Start: 2025-07-08 | End: 2025-07-21

## 2025-07-08 RX ORDER — ACETAMINOPHEN 500 MG/5ML
2 LIQUID (ML) ORAL
Qty: 0 | Refills: 0 | DISCHARGE
Start: 2025-07-08

## 2025-07-08 RX ORDER — POLYETHYLENE GLYCOL 3350 17 G/17G
17 POWDER, FOR SOLUTION ORAL DAILY
Refills: 0 | Status: DISCONTINUED | OUTPATIENT
Start: 2025-07-08 | End: 2025-07-09

## 2025-07-08 RX ORDER — DEXAMETHASONE 0.5 MG/1
2 TABLET ORAL
Qty: 31 | Refills: 0
Start: 2025-07-08 | End: 2025-07-12

## 2025-07-08 RX ORDER — OXYCODONE HYDROCHLORIDE 30 MG/1
1 TABLET ORAL
Qty: 12 | Refills: 0
Start: 2025-07-08 | End: 2025-07-10

## 2025-07-08 RX ORDER — KETOROLAC TROMETHAMINE 30 MG/ML
15 INJECTION, SOLUTION INTRAMUSCULAR; INTRAVENOUS EVERY 6 HOURS
Refills: 0 | Status: DISCONTINUED | OUTPATIENT
Start: 2025-07-08 | End: 2025-07-09

## 2025-07-08 RX ORDER — LEVETIRACETAM 10 MG/ML
1 INJECTION, SOLUTION INTRAVENOUS
Refills: 0 | DISCHARGE

## 2025-07-08 RX ORDER — NALOXONE HYDROCHLORIDE 0.4 MG/ML
1 INJECTION, SOLUTION INTRAMUSCULAR; INTRAVENOUS; SUBCUTANEOUS
Qty: 1 | Refills: 0
Start: 2025-07-08 | End: 2025-07-08

## 2025-07-08 RX ORDER — ENOXAPARIN SODIUM 100 MG/ML
40 INJECTION SUBCUTANEOUS EVERY 24 HOURS
Refills: 0 | Status: DISCONTINUED | OUTPATIENT
Start: 2025-07-08 | End: 2025-07-09

## 2025-07-08 RX ORDER — LEVETIRACETAM 10 MG/ML
1 INJECTION, SOLUTION INTRAVENOUS
Qty: 60 | Refills: 0
Start: 2025-07-08 | End: 2025-08-06

## 2025-07-08 RX ADMIN — Medication 2 TABLET(S): at 21:55

## 2025-07-08 RX ADMIN — INSULIN LISPRO 4: 100 INJECTION, SOLUTION INTRAVENOUS; SUBCUTANEOUS at 17:34

## 2025-07-08 RX ADMIN — DEXAMETHASONE 4 MILLIGRAM(S): 0.5 TABLET ORAL at 05:24

## 2025-07-08 RX ADMIN — INSULIN LISPRO 4: 100 INJECTION, SOLUTION INTRAVENOUS; SUBCUTANEOUS at 22:38

## 2025-07-08 RX ADMIN — Medication 40 MILLIGRAM(S): at 06:36

## 2025-07-08 RX ADMIN — OXYCODONE HYDROCHLORIDE 5 MILLIGRAM(S): 30 TABLET ORAL at 01:07

## 2025-07-08 RX ADMIN — Medication 2 PACKET(S): at 06:35

## 2025-07-08 RX ADMIN — Medication 1000 MILLIGRAM(S): at 05:23

## 2025-07-08 RX ADMIN — Medication 100 MILLIGRAM(S): at 00:43

## 2025-07-08 RX ADMIN — OXYCODONE HYDROCHLORIDE 10 MILLIGRAM(S): 30 TABLET ORAL at 08:29

## 2025-07-08 RX ADMIN — Medication 4 MILLIGRAM(S): at 05:24

## 2025-07-08 RX ADMIN — INSULIN LISPRO 4: 100 INJECTION, SOLUTION INTRAVENOUS; SUBCUTANEOUS at 06:35

## 2025-07-08 RX ADMIN — Medication 4 MILLIGRAM(S): at 17:08

## 2025-07-08 RX ADMIN — LEVETIRACETAM 500 MILLIGRAM(S): 10 INJECTION, SOLUTION INTRAVENOUS at 17:08

## 2025-07-08 RX ADMIN — LEVETIRACETAM 500 MILLIGRAM(S): 10 INJECTION, SOLUTION INTRAVENOUS at 05:23

## 2025-07-08 RX ADMIN — OXYCODONE HYDROCHLORIDE 10 MILLIGRAM(S): 30 TABLET ORAL at 08:54

## 2025-07-08 RX ADMIN — Medication 1000 MILLIGRAM(S): at 06:00

## 2025-07-08 RX ADMIN — Medication 1000 MILLIGRAM(S): at 13:24

## 2025-07-08 RX ADMIN — OXYCODONE HYDROCHLORIDE 10 MILLIGRAM(S): 30 TABLET ORAL at 14:03

## 2025-07-08 RX ADMIN — OXYCODONE HYDROCHLORIDE 5 MILLIGRAM(S): 30 TABLET ORAL at 00:47

## 2025-07-08 RX ADMIN — INSULIN LISPRO 2: 100 INJECTION, SOLUTION INTRAVENOUS; SUBCUTANEOUS at 13:52

## 2025-07-08 RX ADMIN — Medication 1000 MILLIGRAM(S): at 21:56

## 2025-07-08 RX ADMIN — OXYCODONE HYDROCHLORIDE 10 MILLIGRAM(S): 30 TABLET ORAL at 22:00

## 2025-07-08 RX ADMIN — Medication 4 MILLIGRAM(S): at 11:40

## 2025-07-08 RX ADMIN — DEXAMETHASONE 4 MILLIGRAM(S): 0.5 TABLET ORAL at 11:40

## 2025-07-08 RX ADMIN — OXYCODONE HYDROCHLORIDE 10 MILLIGRAM(S): 30 TABLET ORAL at 21:15

## 2025-07-08 RX ADMIN — DEXAMETHASONE 4 MILLIGRAM(S): 0.5 TABLET ORAL at 17:08

## 2025-07-08 RX ADMIN — Medication 1000 MILLIGRAM(S): at 22:56

## 2025-07-08 RX ADMIN — ENOXAPARIN SODIUM 40 MILLIGRAM(S): 100 INJECTION SUBCUTANEOUS at 21:56

## 2025-07-08 RX ADMIN — OXYCODONE HYDROCHLORIDE 10 MILLIGRAM(S): 30 TABLET ORAL at 13:24

## 2025-07-08 NOTE — PROGRESS NOTE ADULT - ASSESSMENT
ASSESSMENT  45-year-old female with prior left awake craniotomy on 1/26/24.  MRI shows a grossly stable surgical cavity with a tiny new focus of T1 hyperintense blood products in the periventricular region and slightly more prominent FLAIR hyperintensity and slight enlargement of enhancement of tumor lesion.  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    PLAN  NEURO:  Neurochecks Q4h  Analgesia: Cranial ERAS  Seizure treatment: Continue Keppra 500mg bid  Epilepsy consult  Cerebral edema: Decadron with taper  Post op MRI  Activity: [x] mobilize as tolerated [] Bedrest [x] PT [x] OT [] PMNR    CARDS:  SBP goal: 100-140    PULM:  Room air  Incentive spirometry as tolerated    GI:  Diet: As tolerated  GI prophylaxis [] not indicated [] PPI [] other:  Bowel regimen [] colace [] senna [] other:    RENAL  Monitor Is/Os  Replete lytes prn    ENDO:   Goal euglycemia (-180)    HEME/ONC:  Monitor H/H  VTE prophylaxis: [x] SCDs [] chemoprophylaxis   ID:  Afebrile    MISC:    SOCIAL/FAMILY:  [x] awaiting [] updated at bedside [] family meeting    CODE STATUS:  [x] Full Code [] DNR [] DNI [] Palliative/Comfort Care    DISPOSITION:  [] ICU [] Stroke Unit [] Floor [] EMU [] RCU [] PCU         ASSESSMENT  45-year-old female with prior left awake craniotomy on 1/26/24.  MRI shows a grossly stable surgical cavity with a tiny new focus of T1 hyperintense blood products in the periventricular region and slightly more prominent FLAIR hyperintensity and slight enlargement of enhancement of tumor lesion.  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    PLAN  NEURO:  Neurochecks Q4h  Analgesia: Cranial ERAS  Seizure treatment: Continue Keppra 500mg bid  Epilepsy consulted  Cerebral edema: Decadron with taper  Post op MRI pending  Activity: [x] mobilize as tolerated [] Bedrest [x] PT [x] OT [] PMNR    CARDS:  SBP goal: 100-140    PULM:  Room air  Incentive spirometry as tolerated    GI:  Diet: As tolerated  GI prophylaxis [] not indicated [x] PPI [] other:  Bowel regimen [] colace [x] senna [] other: miralax    RENAL  Monitor Is/Os  Replete lytes prn    ENDO:   Goal euglycemia (-180)  ISS    HEME/ONC:  Monitor H/H  VTE prophylaxis: [x] SCDs [x] chemoprophylaxis     ID: Mild leukocytosis.   Afebrile    MISC:    SOCIAL/FAMILY:  [x] awaiting [] updated at bedside [] family meeting    CODE STATUS:  [x] Full Code [] DNR [] DNI [] Palliative/Comfort Care    DISPOSITION:  [x] ICU [] Stroke Unit [] Floor [] EMU [] RCU [] PCU

## 2025-07-08 NOTE — OCCUPATIONAL THERAPY INITIAL EVALUATION ADULT - SENSORY TESTS
Cranial Nerves II - XII: II: PERRLA; visual fields are full to confrontation III, IV, VI: EOMI, no nystagmus appreciated V: facial sensation diminished on R VII: no ptosis, no facial droop, symmetric eyebrow raise and closure VIII: hearing diminished on R  XI: head turning and shoulder shrug intact b/l XII: tongue protrusion R; Vision H-Test: bilateral tracking and smooth pursuit intact;  Vision Quadrant Test: intact bilaterally.

## 2025-07-08 NOTE — DISCHARGE NOTE PROVIDER - NSDCFUSCHEDAPPT_GEN_ALL_CORE_FT
Nikole Burch  [facility]  NEUROSURG [address]  Scheduled Appointment: 07/15/2025    D'Amico, Randy  [facility]  NEUROSURG [address]  Scheduled Appointment: 07/23/2025    [facility]  NEUROLOGY [address]  Scheduled Appointment: 08/13/2025    Annette Bower  [facility]  NEUROLOGY [address]  Scheduled Appointment: 09/29/2025

## 2025-07-08 NOTE — OCCUPATIONAL THERAPY INITIAL EVALUATION ADULT - PERTINENT HX OF CURRENT PROBLEM, REHAB EVAL
11/21/2022 3:40 PM    Mr. Melecio Ortiz  [id number] [facility] [address]    [facility] your labs show:  - normal blood counts, kidney function, liver function  - thyroid labs show mild low thyroid function but it is not severe enough to require medication. It is OK to stay off the medication. -  cholesterol looks good, no need for cholesterol lowering medication. A couple years ago, triglycerides were pretty high but they are well in the normal range now.    - hepatitis C screening is negative.     Take care,      Bill Amaya MD 45-year-old female with prior left awake craniotomy on 1/26/24. MRI shows a grossly stable surgical cavity with a tiny new focus of T1 hyperintense blood products in the periventricular region and slightly more prominent FLAIR hyperintensity and slight enlargement of enhancement of tumor lesion. Now POD 1 s/p awake craniotomy for resection (7/7/25)

## 2025-07-08 NOTE — OCCUPATIONAL THERAPY INITIAL EVALUATION ADULT - LEVEL OF INDEPENDENCE: TOILET, REHAB EVAL
11/14/20 1029   Referral Data   Date of Initial Referral 11/14/20   Referral Source Standard   Referral Reason D/C Plan   Patient Information   Information Source Patient   Mental Status Now Alert;Oriented to Person;Oriented to Place;Oriented to Reason for Hospitalization;Oriented to Time   Patient Communication Pleasant;Cooperative   Living Arrangements Alone   Type of Residence Apartment   Support Comments Spoke with patient to discuss discharge plan/needs. Lives at home alone. Has walker and oxygen(2.5L at night- [place]) at home. Currently receiving [facility]. Patient wishes to return home upon discharge. Denies any additional discharge needs at this time.   Functional Status   Ambulation Walker   Meal Preparation Independent/Self   Discharge/Care Planning   Patient's Anticipated Discharge Needs Anticipated discharge needs eval completed   Patient/Family Discharge Goal Home   Patient/Family Agree to Plan Patient agrees and understands goals and plan   Goals   Goal #1 Home discharge arranged      independent

## 2025-07-08 NOTE — PHYSICAL THERAPY INITIAL EVALUATION ADULT - MODALITIES TREATMENT COMMENTS
II: Visual fields are full to confrontation; III, IV, VI: Extraocular movements are intact without nystagmus;  VII: mild R nasolabial fold flattening ; XII: Tongue deviated to R

## 2025-07-08 NOTE — PROVIDER CONTACT NOTE (OTHER) - ACTION/TREATMENT ORDERED:
NS 1L bolus to be administered.
Patient's bp parameter to be changed.  Will notice MD when it is less than 90mm Hg and greater than 140mm Hg.

## 2025-07-08 NOTE — OCCUPATIONAL THERAPY INITIAL EVALUATION ADULT - DIAGNOSIS, OT EVAL
Pt presents to [facility] with enlargement of L tumor lesion + FLAIR hyperintensity now POD 1 sp L awake craniotomy and resection. OT consulted and pt discharged from skilled OT as indep with rec for OP OT.

## 2025-07-08 NOTE — OCCUPATIONAL THERAPY INITIAL EVALUATION ADULT - ADDITIONAL COMMENTS
Pt r handed and does not wear glasses. Pt lives alone in dual family home with 1 FOS to enter. Pt's adult daughter to assist upon dc as needed. Pt indep at baseline without AD or DME needs.

## 2025-07-08 NOTE — DISCHARGE NOTE PROVIDER - NSDCCPCAREPLAN_GEN_ALL_CORE_FT
PRINCIPAL DISCHARGE DIAGNOSIS  Diagnosis: Brain lesion  Assessment and Plan of Treatment: you underwent left sided re-op craniotomy for resection of left frontal brain lesion on 7/7  Follow up with Dr. D'Amico      SECONDARY DISCHARGE DIAGNOSES  Diagnosis: Seizure  Assessment and Plan of Treatment: continue home Keppra    Diagnosis: Smoking history  Assessment and Plan of Treatment:

## 2025-07-08 NOTE — DISCHARGE NOTE PROVIDER - CARE PROVIDER_API CALL
D'Amico, Randy Scott  Neurological Surgery  [address] [address]  Phone: [phone]  Fax: [phone]  Follow Up Time:     Annette Bower  Neurology  [address]  [place] [address]  Phone: [phone]  Fax: [phone]  Follow Up Time:

## 2025-07-08 NOTE — DISCHARGE NOTE PROVIDER - HOSPITAL COURSE
HPI:  44 y/o right- handed female, former smoker, with no significant PMHx, found to have left frontal motor strip cystic lesion on MRI 1/20/2023 during workup for progressive right fingertip numbness for which went to [facility] and MRI showed small area of signal abnormality in L posterior frontal subcortical and deep white matter, and 3.7x1.8 cm arachnoid cyst. Numbness and weakness continued to progress to entire Right arm and Right leg occurring several times daily and an aura sensation in her Left ear. MRI on 5/30/23 showed progression leading to referral to [facility] Neurosurgery and neurology. Started on keppra BID by Dr. Doty to r/o seizure, no improvement of symptoms. MS workup negative. Serial imaging completed which showed increase in size of lesion. 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 Also with intermittent headaches that are improving.Right sided numbness sensations and weakness improved immediate post op and since returned to how it was pre- op.Incision CDI.4/11/24 patient returned for f/u and 2 month repeat MRI review which was done 4/8 and stable.She continues to report headaches and speech issues but language improving.Recommended to repeat MRI 3 months.7/3/24 pt returned for 3 month follow- up and review of MRI done 6/24/24 which was stable.Speech stable. Recommended 6 month follow- up.1/29/25 pt returned for 6 month follow- up and review of 1/22/25 MRI which was stable. Recommended 6 month follow- up, continue neurology follow- up for seizure control/ AED management. 5/25/25 MRI- Ovoid region of intrinsic T1 hyperintensity and surrounding mild enhancement along the left frontoparietal opercular resection cavity appears grossly similar in size to prior study. Mild FLAIR hyperintensity surrounding the cavity is similar in extent.There is a tiny new focus of T1 hyperintense blood products medial to the cavity, in the periventricular region, with slightly more prominent FLAIR hyperintensity.Returns TODAY for follow- up and MRI review. She is currently on keppra 500 mg BID. Neurologist: Sobia Doty--> Scarlet Alvarado NP  "       Hospital Course:  7/7: POD0 L awake crani tumor resection, frozen: glial cell origin. epilepsy consulted. 1L NS bolus given for soft pressures.   7/8: POD1.       Patient evaluated by PT/OT who recommended: home with outpatient OT  Patient is going home     Hospital course uncomplicated    Exam on day of discharge:  GEN: laying in bed, NAD  NEURO: AOx3. FC, OE spont, +word finding difficulties, mild R nasolabial fold flattening with decreased sensation to R face. PERRL 3 mm, EOMI. +mild RUE drift. 4/5 strength to RUE, remaining extremities 5/5. Decreased sensation to right upper and lower extremity. +RUE dysmetria   CV: RRR +S1/S2  PULM: CTAB  GI: Abd soft, NT/ND  EXT: ext warm, dry, nontender  WOUND: crani site C/D/I    Patient is neuro stable, vitals stable, afebrile, medically ready for discharge     Given the ongoing treatment plan, please note that the patient has a high potential for further admissions to deliver ongoing treatment and/or procedures as part of the normal course of neurosurgical care    HPI:  46 y/o right- handed female, former smoker, with no significant PMHx, found to have left frontal motor strip cystic lesion on MRI 1/20/2023 during workup for progressive right fingertip numbness for which went to [facility] and MRI showed small area of signal abnormality in L posterior frontal subcortical and deep white matter, and 3.7x1.8 cm arachnoid cyst. Numbness and weakness continued to progress to entire Right arm and Right leg occurring several times daily and an aura sensation in her Left ear. MRI on 5/30/23 showed progression leading to referral to [facility] Neurosurgery and neurology. Started on keppra BID by Dr. Doty to r/o seizure, no improvement of symptoms. MS workup negative. Serial imaging completed which showed increase in size of lesion. 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 Also with intermittent headaches that are improving.Right sided numbness sensations and weakness improved immediate post op and since returned to how it was pre- op.Incision CDI.4/11/24 patient returned for f/u and 2 month repeat MRI review which was done 4/8 and stable.She continues to report headaches and speech issues but language improving.Recommended to repeat MRI 3 months.7/3/24 pt returned for 3 month follow- up and review of MRI done 6/24/24 which was stable.Speech stable. Recommended 6 month follow- up.1/29/25 pt returned for 6 month follow- up and review of 1/22/25 MRI which was stable. Recommended 6 month follow- up, continue neurology follow- up for seizure control/ AED management. 5/25/25 MRI- Ovoid region of intrinsic T1 hyperintensity and surrounding mild enhancement along the left frontoparietal opercular resection cavity appears grossly similar in size to prior study. Mild FLAIR hyperintensity surrounding the cavity is similar in extent.There is a tiny new focus of T1 hyperintense blood products medial to the cavity, in the periventricular region, with slightly more prominent FLAIR hyperintensity.Returns TODAY for follow- up and MRI review. She is currently on keppra 500 mg BID. Neurologist: Sobia Doty--> Scarlet Alvarado NP  "       Hospital Course:  7/7: POD0 L awake crani tumor resection, frozen: glial cell origin. epilepsy consulted. 1L NS bolus given for soft pressures.   7/8: POD1. regional. MRI complete.   7/9: POD2, HAI overnight      Patient evaluated by PT/OT who recommended: home with outpatient PT and OT  Patient is going home     Hospital course uncomplicated    Exam on day of discharge:  GEN: laying in bed, NAD  NEURO: AOx3. FC, OE spont, +word finding difficulties, mild R nasolabial fold flattening with decreased sensation to R face. PERRL 3 mm, EOMI. +mild RUE drift. 4/5 strength to RUE, remaining extremities 5/5. Decreased sensation to right upper and lower extremity. +RUE dysmetria   CV: RRR +S1/S2  PULM: CTAB  GI: Abd soft, NT/ND  EXT: ext warm, dry, nontender  WOUND: crani site C/D/I    Patient is neuro stable, vitals stable, afebrile, medically ready for discharge     Given the ongoing treatment plan, please note that the patient has a high potential for further admissions to deliver ongoing treatment and/or procedures as part of the normal course of neurosurgical care    HPI:  46 y/o right- handed female, former smoker, with no significant PMHx, found to have left frontal motor strip cystic lesion on MRI 1/20/2023 during workup for progressive right fingertip numbness for which went to [facility] and MRI showed small area of signal abnormality in L posterior frontal subcortical and deep white matter, and 3.7x1.8 cm arachnoid cyst. Numbness and weakness continued to progress to entire Right arm and Right leg occurring several times daily and an aura sensation in her Left ear. MRI on 5/30/23 showed progression leading to referral to [facility] Neurosurgery and neurology. Started on keppra BID by Dr. Doty to r/o seizure, no improvement of symptoms. MS workup negative. Serial imaging completed which showed increase in size of lesion. 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 Also with intermittent headaches that are improving.Right sided numbness sensations and weakness improved immediate post op and since returned to how it was pre- op.Incision CDI.4/11/24 patient returned for f/u and 2 month repeat MRI review which was done 4/8 and stable.She continues to report headaches and speech issues but language improving.Recommended to repeat MRI 3 months.7/3/24 pt returned for 3 month follow- up and review of MRI done 6/24/24 which was stable.Speech stable. Recommended 6 month follow- up.1/29/25 pt returned for 6 month follow- up and review of 1/22/25 MRI which was stable. Recommended 6 month follow- up, continue neurology follow- up for seizure control/ AED management. 5/25/25 MRI- Ovoid region of intrinsic T1 hyperintensity and surrounding mild enhancement along the left frontoparietal opercular resection cavity appears grossly similar in size to prior study. Mild FLAIR hyperintensity surrounding the cavity is similar in extent.There is a tiny new focus of T1 hyperintense blood products medial to the cavity, in the periventricular region, with slightly more prominent FLAIR hyperintensity.Returns TODAY for follow- up and MRI review. She is currently on keppra 500 mg BID. Neurologist: Sobia Doty--> Scarlet Alvarado NP  "       Hospital Course:  7/7: POD0 L awake crani tumor resection, frozen: glial cell origin. epilepsy consulted. 1L NS bolus given for soft pressures.   7/8: POD1. regional. MRI complete.   7/9: POD2, HAI overnight      Patient evaluated by PT/OT who recommended: home with outpatient PT and OT  Patient is going home     Hospital course uncomplicated    Exam on day of discharge:  GEN: laying in bed, NAD  NEURO: AOx3. FC, OE spont, +word finding difficulties, mild R nasolabial fold flattening with decreased sensation to R face. PERRL 3 mm, EOMI. +mild RUE drift. 4/5 strength to RUE, remaining extremities 5/5. Decreased sensation to right upper and lower extremity. +RUE dysmetria   CV: RRR +S1/S2  PULM: CTAB  GI: Abd soft, NT/ND  EXT: ext warm, dry, nontender  WOUND: crani site with staples, C/D/I    Patient is neuro stable, vitals stable, afebrile, medically ready for discharge     Given the ongoing treatment plan, please note that the patient has a high potential for further admissions to deliver ongoing treatment and/or procedures as part of the normal course of neurosurgical care

## 2025-07-08 NOTE — PROGRESS NOTE ADULT - ASSESSMENT
46 y/o right-handed female, former smoker with PMHx of left frontal arachnoid cyst s/p craniotomy and resection 1/2024 who presents to the hospital with persistent right sided numbness and paresthesias, recent MRI 5/25 showing T1 hyperintensity with surrounding mild enhancement along the left frontoparietal opercular resection cavity. Patient is now s/p awake craniotomy of this morning. 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 On exam, right sided sensory deficits and residual right sided weakness noted. No abnormal movements or seizure like episodes reported so far. Sensory events stable post-op.    Recommendations:  - Continue Keppra 500mg bid  - consider CT Head given facial asymmetry  - EEG monitoring when able to per primary team  - Seizure precautions    *Case discussed with Dr. Bower   46 y/o right-handed female, former smoker with PMHx of left frontal arachnoid cyst s/p craniotomy and resection 1/2024 who presents to the hospital with persistent right sided numbness and paresthesias, recent MRI 5/25 showing T1 hyperintensity with surrounding mild enhancement along the left frontoparietal opercular resection cavity. Patient is now s/p awake craniotomy of this morning. 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 On exam, right sided sensory deficits and residual right sided weakness noted. No abnormal movements or seizure like episodes reported so far. Sensory events stable post-op.    Recommendations:  - Continue Keppra 500mg bid  - f/u outpatient with epilepsy  - Seizure precautions    *Case discussed with Dr. Bower   46 y/o right-handed female, former smoker with PMHx of left frontal cystic lesion s/p craniotomy and resection 1/2024 who presents to the hospital with persistent right sided numbness and paresthesias, recent MRI 5/25 showing T1 hyperintensity with surrounding mild enhancement along the left frontoparietal opercular resection cavity. Patient is now s/p awake craniotomy of this morning. 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 On exam, right sided sensory deficits and residual right sided weakness noted. No abnormal movements or seizure like episodes reported so far. Sensory events stable post-op.    Recommendations:  - Continue Keppra 500mg bid  - f/u outpatient with epilepsy  - Seizure precautions    *Case discussed with Dr. Bower

## 2025-07-08 NOTE — DISCHARGE NOTE PROVIDER - NSDCFUADDAPPT_GEN_ALL_CORE_FT
Phone call with CINTHIA Agustin on 7/15 at 11AM (she will call you)     Please follow up with Dr. D'Amico in the office on 7/23 at 9AM, call the office to confirm/make changes to appointment at [phone]    Please follow up with Dr. Bower from Epilepsy for outpatient EEG     Please follow up with your primary care doctor

## 2025-07-08 NOTE — DISCHARGE NOTE PROVIDER - NSDCCPTREATMENT_GEN_ALL_CORE_FT
PRINCIPAL PROCEDURE  Procedure: Craniotomy, with neoplasm excision while awake  Findings and Treatment:

## 2025-07-08 NOTE — DISCHARGE NOTE PROVIDER - CARE PROVIDERS DIRECT ADDRESSES
[email or web address]

## 2025-07-08 NOTE — PHYSICAL THERAPY INITIAL EVALUATION ADULT - GENERAL OBSERVATIONS, REHAB EVAL
Received supine complaints of headache 8/10, vape found in bed, MARYELLEN Bentley, Manager jad aware, NSX aware +EKG, IV hep. Left as found +bed alarm, MARYELLEN Bentley aware

## 2025-07-08 NOTE — DISCHARGE NOTE PROVIDER - NSDCMRMEDTOKEN_GEN_ALL_CORE_FT
acetaminophen 500 mg oral tablet: 2 tab(s) orally every 8 hours as needed for mild to moderate pain  dexAMETHasone 2 mg oral tablet: 2 tab(s) orally every 6 hours TAPER:  4mg (2 tabs) every 6 hours (7/8 and 7/9)   4mg (2 tabs) every 8 hours (7/10)   4mg (2 tabs) every 12 hours (7/11)   2mg (1 tab) every every 12 hours (7/12 and 7/13)   2mg (1 tab) daily (7/14)  levETIRAcetam 500 mg oral tablet: 1 tab(s) orally 2 times a day  Narcan 4 mg/0.1 mL nasal spray: 1 spray(s) intranasally once a day as needed for opioid overdose  oxyCODONE 5 mg oral tablet: 1 tab(s) orally every 6 hours as needed for  severe pain MDD: 4 tabs  pantoprazole 40 mg oral delayed release tablet: 1 tab(s) orally once a day (before a meal)  polyethylene glycol 3350 oral powder for reconstitution: 17 gram(s) orally once a day as needed for  constipation

## 2025-07-08 NOTE — PROGRESS NOTE ADULT - SUBJECTIVE AND OBJECTIVE BOX
=========================  NSICU ATTENDING PROGRESS NOTE  =========================    HPI: Taken from outpatient documentation  Patient is a 46 y/o right- handed female, former smoker, with no significant PMHx, found to have left frontal motor strip cystic lesion on MRI 1/20/2023 during workup for progressive right fingertip numbness for which went to [facility] and MRI showed small area of signal abnormality in L posterior frontal subcortical and deep white matter, and 3.7x1.8 cm arachnoid cyst. Numbness and weakness continued to progress to entire Right arm and Right leg occurring several times daily and an aura sensation in her Left ear. MRI on 5/30/23 showed progression leading to referral to [facility] Neurosurgery and neurology. Started on keppra BID by Dr. Doty to r/o seizure, no improvement of symptoms. MS workup negative. Serial imaging completed which showed increase in size of lesion. Also with progression of symptoms to include right arm weakness, right leg intermittent weakness, right tongue deviation. Underwent left sided awake craniotomy for resection of brain tumor 1/26/24. 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  2/8/24 post op: Speech issues with some improvement but continues to have some paraphasic errors. Also with intermittent headaches that are improving. Right sided numbness sensations and weakness improved immediate post op and since returned to baseline pre- op.  4/11/24 Patient returned for f/u and 2 month repeat MRI review which was done 4/8 and stable. She continues to report headaches and speech issues but language improving. Recommended to repeat MRI 3 months.  7/3/24 pt returned for 3 month follow- up and review of MRI done 6/24/24 which was stable. Speech stable. Recommended 6 month follow- up.  1/29/25 pt returned for 6 month follow- up and review of 1/22/25 MRI which was stable. Recommended 6 month follow- up, continue neurology follow- up for seizure control/ AED management.   5/25/25 MRI- Ovoid region of intrinsic T1 hyperintensity and surrounding mild enhancement along the left frontoparietal opercular resection cavity appears grossly similar in size to prior study. Mild FLAIR hyperintensity surrounding the cavity is similar in extent. There is a tiny new focus of T1 hyperintense blood products medial to the cavity, in the periventricular region, with slightly more prominent FLAIR hyperintensity. Patient is currently on keppra 500 mg BID.   Presents for surgery today.  She reports continued right sided paresthesias and numbness. (07 Jul 2025 06:27)    On admission, the patient was:  GCS: 15  Hunt-Joaquin:  modified Freitas:  ICH score:  NIHSS:      ICU Vital Signs Last 24 Hrs  T(C): 36.3 (08 Jul 2025 05:07), Max: 36.6 (07 Jul 2025 13:48)  T(F): 97.4 (08 Jul 2025 05:07), Max: 97.8 (07 Jul 2025 13:48)  HR: 61 (08 Jul 2025 06:00) (47 - 96)  BP: 91/50 (08 Jul 2025 05:00) (86/51 - 114/63)  BP(mean): 65 (08 Jul 2025 05:00) (64 - 85)  ABP: 107/61 (08 Jul 2025 06:00) (90/52 - 126/75)  ABP(mean): 79 (08 Jul 2025 06:00) (66 - 97)  RR: 16 (08 Jul 2025 06:00) (15 - 19)  SpO2: 94% (08 Jul 2025 06:00) (92% - 99%)      07-07-25 @ 07:01  -  07-08-25 @ 07:00  --------------------------------------------------------  IN: 3045 mL / OUT: 2500 mL / NET: 545 mL      EXAMINATION:  General: Calm  HEENT:  MMM  Neuro: Patient awake, alert, oriented x 3, follows commands. expressive aphasia, word finding difficulty  Pupils 3mm and reactive, RNLF flattening  Power 5/5 in all extremities except R HG 4+/5. Mild pronation/ curling of R hand though corrects  Decreased sensation over the RUE and RLE (baseline)  Cards: S1/S2, RRR  Respiratory: Clear, no wheeze  Abdomen: Soft, non- tender  Extremities: No edema  Skin: Warm/dry      MEDICATIONS:   acetaminophen     Tablet .. 1000 milliGRAM(s) Oral every 8 hours  dexAMETHasone  Injectable   IV Push   dexAMETHasone  Injectable 4 milliGRAM(s) IV Push every 6 hours  enoxaparin Injectable 40 milliGRAM(s) SubCutaneous every 24 hours  insulin lispro (ADMELOG) corrective regimen sliding scale   SubCutaneous Before meals and at bedtime  ketorolac   Injectable 15 milliGRAM(s) IV Push every 6 hours PRN  levETIRAcetam 500 milliGRAM(s) Oral two times a day  ondansetron Injectable 4 milliGRAM(s) IV Push every 6 hours  oxyCODONE    IR 10 milliGRAM(s) Oral every 4 hours PRN  oxyCODONE    IR 5 milliGRAM(s) Oral every 4 hours PRN  pantoprazole    Tablet 40 milliGRAM(s) Oral before breakfast  polyethylene glycol 3350 17 Gram(s) Oral daily PRN  senna 2 Tablet(s) Oral at bedtime    LABS:                       11.2   15.29 )-----------( 301      ( 08 Jul 2025 05:30 )             33.2     07-08    138  |  107  |  11  ----------------------------<  223[H]  4.3   |  20[L]  |  0.60    Ca    8.8      08 Jul 2025 05:30  Phos  2.4     07-08  Mg     2.1     07-08               =========================  NSICU ATTENDING PROGRESS NOTE  =========================    HPI: Taken from outpatient documentation  Patient is a 46 y/o right- handed female, former smoker, with no significant PMHx, found to have left frontal motor strip cystic lesion on MRI 1/20/2023 during workup for progressive right fingertip numbness for which went to [facility] and MRI showed small area of signal abnormality in L posterior frontal subcortical and deep white matter, and 3.7x1.8 cm arachnoid cyst. Numbness and weakness continued to progress to entire Right arm and Right leg occurring several times daily and an aura sensation in her Left ear. MRI on 5/30/23 showed progression leading to referral to [facility] Neurosurgery and neurology. Started on keppra BID by Dr. Doty to r/o seizure, no improvement of symptoms. MS workup negative. Serial imaging completed which showed increase in size of lesion. Also with progression of symptoms to include right arm weakness, right leg intermittent weakness, right tongue deviation. Underwent left sided awake craniotomy for resection of brain tumor 1/26/24. 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  2/8/24 post op: Speech issues with some improvement but continues to have some paraphasic errors. Also with intermittent headaches that are improving. Right sided numbness sensations and weakness improved immediate post op and since returned to baseline pre- op.  4/11/24 Patient returned for f/u and 2 month repeat MRI review which was done 4/8 and stable. She continues to report headaches and speech issues but language improving. Recommended to repeat MRI 3 months.  7/3/24 pt returned for 3 month follow- up and review of MRI done 6/24/24 which was stable. Speech stable. Recommended 6 month follow- up.  1/29/25 pt returned for 6 month follow- up and review of 1/22/25 MRI which was stable. Recommended 6 month follow- up, continue neurology follow- up for seizure control/ AED management.   5/25/25 MRI- Ovoid region of intrinsic T1 hyperintensity and surrounding mild enhancement along the left frontoparietal opercular resection cavity appears grossly similar in size to prior study. Mild FLAIR hyperintensity surrounding the cavity is similar in extent. There is a tiny new focus of T1 hyperintense blood products medial to the cavity, in the periventricular region, with slightly more prominent FLAIR hyperintensity. Patient is currently on keppra 500 mg BID.   Presents for surgery today.  She reports continued right sided paresthesias and numbness. (07 Jul 2025 06:27)    On admission, the patient was:  GCS: 15  Hunt-Joaquin:  modified Freitas:  ICH score:  NIHSS:      ICU Vital Signs Last 24 Hrs  T(C): 36.3 (08 Jul 2025 05:07), Max: 36.6 (07 Jul 2025 13:48)  T(F): 97.4 (08 Jul 2025 05:07), Max: 97.8 (07 Jul 2025 13:48)  HR: 61 (08 Jul 2025 06:00) (47 - 96)  BP: 91/50 (08 Jul 2025 05:00) (86/51 - 114/63)  BP(mean): 65 (08 Jul 2025 05:00) (64 - 85)  ABP: 107/61 (08 Jul 2025 06:00) (90/52 - 126/75)  ABP(mean): 79 (08 Jul 2025 06:00) (66 - 97)  RR: 16 (08 Jul 2025 06:00) (15 - 19)  SpO2: 94% (08 Jul 2025 06:00) (92% - 99%)      07-07-25 @ 07:01  -  07-08-25 @ 07:00  --------------------------------------------------------  IN: 3045 mL / OUT: 2500 mL / NET: 545 mL      EXAMINATION:  General: Calm  HEENT:  MMM  Neuro: Patient awake, alert, oriented x 3, follows commands. expressive aphasia, word finding difficulty  Pupils 3mm and reactive, RNLF flattening  Power 5/5 in all extremities except RUE and R HG 4+/5. RUE pronator drift.  Decreased sensation over the RUE and RLE (baseline)  Cards: S1/S2, RRR  Respiratory: Clear, no wheeze  Abdomen: Soft, non- tender  Extremities: No edema  Skin: Warm/dry      MEDICATIONS:   acetaminophen     Tablet .. 1000 milliGRAM(s) Oral every 8 hours  dexAMETHasone  Injectable   IV Push   dexAMETHasone  Injectable 4 milliGRAM(s) IV Push every 6 hours  enoxaparin Injectable 40 milliGRAM(s) SubCutaneous every 24 hours  insulin lispro (ADMELOG) corrective regimen sliding scale   SubCutaneous Before meals and at bedtime  ketorolac   Injectable 15 milliGRAM(s) IV Push every 6 hours PRN  levETIRAcetam 500 milliGRAM(s) Oral two times a day  ondansetron Injectable 4 milliGRAM(s) IV Push every 6 hours  oxyCODONE    IR 10 milliGRAM(s) Oral every 4 hours PRN  oxyCODONE    IR 5 milliGRAM(s) Oral every 4 hours PRN  pantoprazole    Tablet 40 milliGRAM(s) Oral before breakfast  polyethylene glycol 3350 17 Gram(s) Oral daily PRN  senna 2 Tablet(s) Oral at bedtime    LABS:                       11.2   15.29 )-----------( 301      ( 08 Jul 2025 05:30 )             33.2     07-08    138  |  107  |  11  ----------------------------<  223[H]  4.3   |  20[L]  |  0.60    Ca    8.8      08 Jul 2025 05:30  Phos  2.4     07-08  Mg     2.1     07-08

## 2025-07-08 NOTE — PROGRESS NOTE ADULT - SUBJECTIVE AND OBJECTIVE BOX
Neurology Progress Note    Interval History: No overnight events  Patient seen and examined at bedside. She states that she feels her speech has gotten worse today. She admits to not having any more seizures with numbness since post surgery.    Vital Signs Last 24 Hrs  T(C): 36.3 (08 Jul 2025 05:07), Max: 36.4 (07 Jul 2025 22:12)  T(F): 97.4 (08 Jul 2025 05:07), Max: 97.5 (07 Jul 2025 22:12)  HR: 58 (08 Jul 2025 08:00) (47 - 96)  BP: 91/50 (08 Jul 2025 05:00) (86/51 - 114/63)  BP(mean): 65 (08 Jul 2025 05:00) (64 - 85)  RR: 16 (08 Jul 2025 08:00) (15 - 18)  SpO2: 95% (08 Jul 2025 08:00) (92% - 99%)    Parameters below as of 08 Jul 2025 08:00  Patient On (Oxygen Delivery Method): room air    Neurological Examination:  General: Appearance is consistent with chronologic age.   Cognitive/Language: Awake, alert, and oriented to person, place, time and date.  Recent and remote memory intact.  Fund of knowledge is appropriate. Mild aphasia with fluency, unchanged from yesterday  Cranial Nerves  - Eyes: Visual acuity intact, Visual fields full.  EOMI w/o nystagmus, skew or reported double vision.  PERRL.  No ptosis/weakness of eyelid closure.    - Face: Facial sensation V1-V3 decreased on the right side. Notable facial asymmetry with decreased right nasolabial folds  - Ears/Nose/Throat: Hearing grossly intact b/l to finger rub.  Palate elevates midline. Tongue deviation to the right side  Motor exam: Normal tone and bulk. No tenderness, twitching, tremors or involuntary movements.  Strength Test: 5/5 strength on the RUE. 5/5 strength in the LUE and b/l lower extremities  Sensory examination: Sensory dull on the right side to light touch and temperature. Intact proprioception and vibration in all extremities.   Reflexes: 2+ b/l biceps, triceps, patella and achilles.  Plantar response downgoing b/l.  Marti, clonus absent.  Cerebellum: FTN/HKS intact. No dysmetria.    Gait: Not assessed    Labs:  CBC Full  -  ( 08 Jul 2025 05:30 )  WBC Count : 15.29 K/uL  RBC Count : 3.63 M/uL  Hemoglobin : 11.2 g/dL  Hematocrit : 33.2 %  Platelet Count - Automated : 301 K/uL  Mean Cell Volume : 91.5 fl  Mean Cell Hemoglobin : 30.9 pg  Mean Cell Hemoglobin Concentration : 33.7 g/dL  Auto Neutrophil # : x  Auto Lymphocyte # : x  Auto Monocyte # : x  Auto Eosinophil # : x  Auto Basophil # : x  Auto Neutrophil % : x  Auto Lymphocyte % : x  Auto Monocyte % : x  Auto Eosinophil % : x  Auto Basophil % : x    07-08    138  |  107  |  11  ----------------------------<  223[H]  4.3   |  20[L]  |  0.60    Ca    8.8      08 Jul 2025 05:30  Phos  2.4     07-08  Mg     2.1     07-08    PT/INR - ( 07 Jul 2025 12:54 )   PT: 10.4 sec;   INR: 0.90       PTT - ( 07 Jul 2025 12:54 )  PTT:26.0 sec  Urinalysis Basic - ( 08 Jul 2025 05:30 )    Color: x / Appearance: x / SG: x / pH: x  Gluc: 223 mg/dL / Ketone: x  / Bili: x / Urobili: x   Blood: x / Protein: x / Nitrite: x   Leuk Esterase: x / RBC: x / WBC x   Sq Epi: x / Non Sq Epi: x / Bacteria: x    Radiology and Other Testings:    Medications:  acetaminophen     Tablet .. 1000 milliGRAM(s) Oral every 8 hours  dexAMETHasone  Injectable   IV Push   dexAMETHasone  Injectable 4 milliGRAM(s) IV Push every 6 hours  enoxaparin Injectable 40 milliGRAM(s) SubCutaneous every 24 hours  insulin lispro (ADMELOG) corrective regimen sliding scale   SubCutaneous Before meals and at bedtime  ketorolac   Injectable 15 milliGRAM(s) IV Push every 6 hours PRN  levETIRAcetam 500 milliGRAM(s) Oral two times a day  ondansetron Injectable 4 milliGRAM(s) IV Push every 6 hours  oxyCODONE    IR 10 milliGRAM(s) Oral every 4 hours PRN  oxyCODONE    IR 5 milliGRAM(s) Oral every 4 hours PRN  pantoprazole    Tablet 40 milliGRAM(s) Oral before breakfast  polyethylene glycol 3350 17 Gram(s) Oral daily  senna 2 Tablet(s) Oral at bedtime

## 2025-07-08 NOTE — PROGRESS NOTE ADULT - SUBJECTIVE AND OBJECTIVE BOX
S/Overnight events: 1L NS bolus given for soft pressures.       Hospital Course:   7/7: POD0 L awake crani tumor resection, frozen: glial cell origin. epilepsy consulted. 1L NS bolus given for soft pressures.   7/8: POD1.       Vital Signs Last 24 Hrs  T(C): 36.4 (08 Jul 2025 00:00), Max: 36.6 (07 Jul 2025 13:48)  T(F): 97.5 (08 Jul 2025 00:00), Max: 97.8 (07 Jul 2025 13:48)  HR: 51 (08 Jul 2025 00:00) (49 - 96)  BP: 95/55 (08 Jul 2025 00:00) (86/51 - 114/63)  BP(mean): 72 (08 Jul 2025 00:00) (64 - 85)  RR: 17 (08 Jul 2025 00:00) (17 - 19)  SpO2: 93% (08 Jul 2025 00:00) (92% - 99%)    Parameters below as of 08 Jul 2025 00:00  Patient On (Oxygen Delivery Method): room air        I&O's Detail    07 Jul 2025 07:01  -  08 Jul 2025 00:12  --------------------------------------------------------  IN:    IV PiggyBack: 700 mL    Oral Fluid: 1080 mL    sodium chloride 0.9%: 140 mL    sodium chloride 0.9%: 75 mL    Sodium Chloride 0.9% Bolus: 1000 mL  Total IN: 2995 mL    OUT:    Voided (mL): 1800 mL  Total OUT: 1800 mL    Total NET: 1195 mL        I&O's Summary    07 Jul 2025 07:01  -  08 Jul 2025 00:12  --------------------------------------------------------  IN: 2995 mL / OUT: 1800 mL / NET: 1195 mL        Exam:  GEN: laying in bed, NAD  NEURO: AOx3. FC, OE spont, +word finding difficulties, mild R nasolabial fold flattening with decreased sensation to R face. PERRL 3 mm, EOMI. +mild RUE drift. 4/5 strength to RUE, remaining extremities 5/5. Decreased sensation to right upper and lower extremity. +RUE dysmetria   CV: RRR +S1/S2  PULM: CTAB  GI: Abd soft, NT/ND  EXT: ext warm, dry, nontender  WOUND/DRAINS: headwrap in place c/d/i      TUBES/LINES:  [] CVC  [x] A-line  [] Lumbar Drain  [] Ventriculostomy  [] Other    DIET:  [] NPO  [x] Mechanical  [] Tube feeds    LABS:  PT/INR - ( 07 Jul 2025 12:54 )   PT: 10.4 sec;   INR: 0.90          PTT - ( 07 Jul 2025 12:54 )  PTT:26.0 sec  Urinalysis Basic - ( 07 Jul 2025 12:54 )    Color: x / Appearance: x / SG: x / pH: x  Gluc: 135 mg/dL / Ketone: x  / Bili: x / Urobili: x   Blood: x / Protein: x / Nitrite: x   Leuk Esterase: x / RBC: x / WBC x   Sq Epi: x / Non Sq Epi: x / Bacteria: x          CAPILLARY BLOOD GLUCOSE      POCT Blood Glucose.: 217 mg/dL (07 Jul 2025 21:29)  POCT Blood Glucose.: 215 mg/dL (07 Jul 2025 16:25)      Drug Levels: [] N/A    CSF Analysis: [] N/A      Allergies    Tylox (Other)    Intolerances      MEDICATIONS:  Antibiotics:  ceFAZolin   IVPB 2000 milliGRAM(s) IV Intermittent every 8 hours    Neuro:  acetaminophen     Tablet .. 1000 milliGRAM(s) Oral every 8 hours  HYDROmorphone  Injectable 0.5 milliGRAM(s) IV Push every 3 hours PRN  levETIRAcetam 500 milliGRAM(s) Oral two times a day  ondansetron Injectable 4 milliGRAM(s) IV Push every 6 hours  oxyCODONE    IR 10 milliGRAM(s) Oral every 4 hours PRN  oxyCODONE    IR 5 milliGRAM(s) Oral every 4 hours PRN    Anticoagulation:    OTHER:  dexAMETHasone  Injectable   IV Push   dexAMETHasone  Injectable 4 milliGRAM(s) IV Push every 6 hours  insulin lispro (ADMELOG) corrective regimen sliding scale   SubCutaneous Before meals and at bedtime  pantoprazole    Tablet 40 milliGRAM(s) Oral before breakfast  polyethylene glycol 3350 17 Gram(s) Oral daily PRN  senna 2 Tablet(s) Oral at bedtime    IVF:    CULTURES:    RADIOLOGY & ADDITIONAL TESTS:      ASSESSMENT:  46 yo F PMH left frontal cystic lesion discovered in January 2023 during workup for progressive right fingertip numbness. She underwent a left awake craniotomy on 1/26/24, path: glial-neuronal tumor. She now reports possible worsening seizure activity vs anxiety. MRI +grossly stable surgical cavity with new focus of T1 hyperintense blood products in the periventricular region and slightly more prominent FLAIR hyperintensity and slight enlargement of enhancement of tumor. Now s/p L awake craniotomy for resection, frozen: glial cell origin (7/7/25)    Neuro:  - neuro.vitals q1h  - pain control: cranial ERAS  - seziure tx: continue home keppra 500mg bid   - epilepsy consulted, f/u recs   - dex taper 1 week to off  - post op MRI pending    Cardiac:  - SBP goal:     Pulm:  - RA    GI:  - regular diet  - bowel regimen  - gi ppx protonix while on steroids     Renal:  - IVL    Endo:  - ISS    Heme:  - SCDs    ID:  - post op ancef    DISPO: NSICU, full code, pending PT/OT    Discussed w Dr. D'Amico, Dr. Lao

## 2025-07-08 NOTE — DISCHARGE NOTE PROVIDER - NSDCFUADDINST_GEN_ALL_CORE_FT
Neurosurgery follow up appointment date/time: 7/23 at 9AM in the office with Dr. D'Amico  - follow up in the office for a wound check  - follow up with epilepsy for EEG outpatient   - please call the office to confirm appointment: [phone]    Wound Care:  - starting tomorrow,   - shower and wash hair daily with shampoo  - gently clean incision with soapy water  - pat dry incision with a clean towel after showering  - leave incision uncovered, open to air  - no picking at incision     Activity:  - fatigue is common after surgery, rest if you feel tired   - no bending, lifting, twisting or heavy lifting   - walking is recommended, ambulate as tolerated  - you may shower when you get home, keep your incision dry  - no soaking in a tub/pool/hot tub   - no driving within 24 hours of anesthesia or while taking prescription pain medications   - keep hydrated, drink plenty of water     Inpatient consults:  - Epilepsy: Follow up outpatient for EEG outpatient     Please also follow up with your primary care doctor.     Pain Expectations:  - pain after surgery is expected  - please take pain meds as prescribed     Medications:  - continue home Keppra for seizure prevention   - new meds:  Dexamethasone (Decadron) Taper for inflammation/cerebral edema (can cause GI ulcer)   Protonix daily while on steroids for GI protection  - pain meds:  Tylenol 1000mg every 8 hours as needed for mild to moderate pain  Oxycodone 5mg every 6 hours as needed for severe pain (can cause sleepiness, constipation)   - adverse affects of meds discussed with patient  - pain medications can cause constipation, you should eat a high fiber diet and may take a stool softener while on pain meds   - Avoid taking Advil/Motrin (ibuprofen), Aleve (naproxen), or Aspirin for pain as they can cause bleeding     Call the office or come to ED if:  - wound has drainage or bleeding, increased redness or pain at incision site, neurological change, seizures, fever (especially if >101), chills, night sweats, syncope, nausea/vomiting, chest pain, shortness of breath      Playback:  - see [email or web address] [facility] for a copy of your discharge paperwork     WITHIN 24 HOURS OF DISCHARGE, PLEASE CONTACT NEURO PA  WITH ANY QUESTIONS OR CONCERNS: [phone]   OTHERWISE, PLEASE CALL THE OFFICE WITH ANY QUESTIONS OR CONCERNS: [phone] Neurosurgery follow up appointment date/time: 7/23 at 9AM in the office with Dr. D'Amico  - follow up in the office for a wound check and staple removal   - follow up with epilepsy for EEG outpatient   - please call the office to confirm appointment: [phone]    Wound Care:  - starting tomorrow,   - shower and wash hair daily with shampoo  - gently clean incision with soapy water  - pat dry incision with a clean towel after showering  - leave incision uncovered, open to air  - no picking at incision     Activity:  - fatigue is common after surgery, rest if you feel tired   - no bending, lifting, twisting or heavy lifting   - walking is recommended, ambulate as tolerated  - you may shower when you get home, keep your incision dry  - no soaking in a tub/pool/hot tub   - no driving within 24 hours of anesthesia or while taking prescription pain medications   - keep hydrated, drink plenty of water     Inpatient consults:  - Epilepsy: Follow up outpatient for EEG outpatient     Please also follow up with your primary care doctor.     Pain Expectations:  - pain after surgery is expected  - please take pain meds as prescribed     Medications:  - continue home Keppra for seizure prevention   - new meds:  Dexamethasone (Decadron) Taper for inflammation/cerebral edema (can cause GI ulcer)   Protonix daily while on steroids for GI protection  - pain meds:  Tylenol 1000mg every 8 hours as needed for mild to moderate pain  Oxycodone 5mg every 6 hours as needed for severe pain (can cause sleepiness, constipation)   - adverse affects of meds discussed with patient  - pain medications can cause constipation, you should eat a high fiber diet and may take a stool softener while on pain meds   - Avoid taking Advil/Motrin (ibuprofen), Aleve (naproxen), or Aspirin for pain as they can cause bleeding     Call the office or come to ED if:  - wound has drainage or bleeding, increased redness or pain at incision site, neurological change, seizures, fever (especially if >101), chills, night sweats, syncope, nausea/vomiting, chest pain, shortness of breath      Playback:  - see [employer] [facility] for a copy of your discharge paperwork     WITHIN 24 HOURS OF DISCHARGE, PLEASE CONTACT NEURO PA  WITH ANY QUESTIONS OR CONCERNS: [phone]   OTHERWISE, PLEASE CALL THE OFFICE WITH ANY QUESTIONS OR CONCERNS: [phone]

## 2025-07-08 NOTE — PROVIDER CONTACT NOTE (OTHER) - ASSESSMENT
Patient awake in bed.  Fiorella reading and cuff pressure below parameter 100-140.  Currently fiorella reading 94/54(71), cuff pressure 86/51(64).
Patient awake.  Systolic blood pressure below parameter, currently through rosie 97/55(71), cuff 95/5(72).  Patient asymptomatic.

## 2025-07-08 NOTE — PHYSICAL THERAPY INITIAL EVALUATION ADULT - PERTINENT HX OF CURRENT PROBLEM, REHAB EVAL
44 y/o right- handed female, former smoker, with no significant PMHx, found to have left frontal motor strip cystic lesion on MRI 1/20/2023 during workup for progressive right fingertip numbness for which went to [facility] and MRI showed small area of signal abnormality in L posterior frontal subcortical and deep white matter, and 3.7x1.8 cm arachnoid cyst. Numbness and weakness continued to progress to entire Right arm and Right leg occurring several times daily and an aura sensation in her Left ear. MRI on 5/30/23 showed progression leading to referral to [facility] Neurosurgery and neurology. Started on keppra BID by Dr. Doty to r/o seizure, no improvement of symptoms. MS workup negative. Serial imaging completed which showed increase in size of lesion. 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 Also with intermittent headaches that are improving.Right sided numbness sensations and weakness improved immediate post op and since returned to how it was pre- op.Incision CDI.4/11/24 patient returned for f/u and 2 month repeat MRI review which was done 4/8 and stable.She continues to report headaches and speech issues but language improving.Recommended to repeat MRI 3 months.7/3/24 pt returned for 3 month follow- up and review of MRI done 6/24/24 which was stable.Speech stable.

## 2025-07-09 ENCOUNTER — TRANSCRIPTION ENCOUNTER (OUTPATIENT)
Age: 45
End: 2025-07-09

## 2025-07-09 VITALS
DIASTOLIC BLOOD PRESSURE: 72 MMHG | RESPIRATION RATE: 16 BRPM | OXYGEN SATURATION: 95 % | SYSTOLIC BLOOD PRESSURE: 110 MMHG | TEMPERATURE: 98 F | HEART RATE: 56 BPM

## 2025-07-09 LAB
ANION GAP SERPL CALC-SCNC: 12 MMOL/L — SIGNIFICANT CHANGE UP (ref 5–17)
BUN SERPL-MCNC: 20 MG/DL — SIGNIFICANT CHANGE UP (ref 7–23)
CALCIUM SERPL-MCNC: 9.4 MG/DL — SIGNIFICANT CHANGE UP (ref 8.4–10.5)
CHLORIDE SERPL-SCNC: 106 MMOL/L — SIGNIFICANT CHANGE UP (ref 96–108)
CO2 SERPL-SCNC: 21 MMOL/L — LOW (ref 22–31)
CREAT SERPL-MCNC: 0.79 MG/DL — SIGNIFICANT CHANGE UP (ref 0.5–1.3)
EGFR: 93 ML/MIN/1.73M2 — SIGNIFICANT CHANGE UP
EGFR: 93 ML/MIN/1.73M2 — SIGNIFICANT CHANGE UP
GLUCOSE SERPL-MCNC: 159 MG/DL — HIGH (ref 70–99)
HCT VFR BLD CALC: 33.9 % — LOW (ref 34.5–45)
HGB BLD-MCNC: 10.8 G/DL — LOW (ref 11.5–15.5)
MAGNESIUM SERPL-MCNC: 2 MG/DL — SIGNIFICANT CHANGE UP (ref 1.6–2.6)
MCHC RBC-ENTMCNC: 31 PG — SIGNIFICANT CHANGE UP (ref 27–34)
MCHC RBC-ENTMCNC: 31.9 G/DL — LOW (ref 32–36)
MCV RBC AUTO: 97.4 FL — SIGNIFICANT CHANGE UP (ref 80–100)
NRBC # BLD AUTO: 0 K/UL — SIGNIFICANT CHANGE UP (ref 0–0)
NRBC # FLD: 0 K/UL — SIGNIFICANT CHANGE UP (ref 0–0)
NRBC BLD AUTO-RTO: 0 /100 WBCS — SIGNIFICANT CHANGE UP (ref 0–0)
PHOSPHATE SERPL-MCNC: 2.6 MG/DL — SIGNIFICANT CHANGE UP (ref 2.5–4.5)
PLATELET # BLD AUTO: 253 K/UL — SIGNIFICANT CHANGE UP (ref 150–400)
PMV BLD: 10.7 FL — SIGNIFICANT CHANGE UP (ref 7–13)
POTASSIUM SERPL-MCNC: 4.4 MMOL/L — SIGNIFICANT CHANGE UP (ref 3.5–5.3)
POTASSIUM SERPL-SCNC: 4.4 MMOL/L — SIGNIFICANT CHANGE UP (ref 3.5–5.3)
RBC # BLD: 3.48 M/UL — LOW (ref 3.8–5.2)
RBC # FLD: 12.5 % — SIGNIFICANT CHANGE UP (ref 10.3–14.5)
SODIUM SERPL-SCNC: 139 MMOL/L — SIGNIFICANT CHANGE UP (ref 135–145)
WBC # BLD: 15.42 K/UL — HIGH (ref 3.8–10.5)
WBC # FLD AUTO: 15.42 K/UL — HIGH (ref 3.8–10.5)

## 2025-07-09 PROCEDURE — 97163 PT EVAL HIGH COMPLEX 45 MIN: CPT

## 2025-07-09 PROCEDURE — 80048 BASIC METABOLIC PNL TOTAL CA: CPT

## 2025-07-09 PROCEDURE — 86901 BLOOD TYPING SEROLOGIC RH(D): CPT

## 2025-07-09 PROCEDURE — 88360 TUMOR IMMUNOHISTOCHEM/MANUAL: CPT

## 2025-07-09 PROCEDURE — 70553 MRI BRAIN STEM W/O & W/DYE: CPT

## 2025-07-09 PROCEDURE — 88307 TISSUE EXAM BY PATHOLOGIST: CPT

## 2025-07-09 PROCEDURE — 36415 COLL VENOUS BLD VENIPUNCTURE: CPT

## 2025-07-09 PROCEDURE — 86900 BLOOD TYPING SEROLOGIC ABO: CPT

## 2025-07-09 PROCEDURE — 82962 GLUCOSE BLOOD TEST: CPT

## 2025-07-09 PROCEDURE — 85027 COMPLETE CBC AUTOMATED: CPT

## 2025-07-09 PROCEDURE — 88333 PATH CONSLTJ SURG CYTO XM 1: CPT

## 2025-07-09 PROCEDURE — 85730 THROMBOPLASTIN TIME PARTIAL: CPT

## 2025-07-09 PROCEDURE — 83036 HEMOGLOBIN GLYCOSYLATED A1C: CPT

## 2025-07-09 PROCEDURE — A9585: CPT

## 2025-07-09 PROCEDURE — 84100 ASSAY OF PHOSPHORUS: CPT

## 2025-07-09 PROCEDURE — 99024 POSTOP FOLLOW-UP VISIT: CPT

## 2025-07-09 PROCEDURE — 85610 PROTHROMBIN TIME: CPT

## 2025-07-09 PROCEDURE — 88331 PATH CONSLTJ SURG 1 BLK 1SPC: CPT

## 2025-07-09 PROCEDURE — 86850 RBC ANTIBODY SCREEN: CPT

## 2025-07-09 PROCEDURE — 88342 IMHCHEM/IMCYTCHM 1ST ANTB: CPT

## 2025-07-09 PROCEDURE — 97165 OT EVAL LOW COMPLEX 30 MIN: CPT

## 2025-07-09 PROCEDURE — 83735 ASSAY OF MAGNESIUM: CPT

## 2025-07-09 PROCEDURE — 88341 IMHCHEM/IMCYTCHM EA ADD ANTB: CPT

## 2025-07-09 PROCEDURE — C1713: CPT

## 2025-07-09 PROCEDURE — C1889: CPT

## 2025-07-09 RX ADMIN — Medication 40 MILLIGRAM(S): at 05:21

## 2025-07-09 RX ADMIN — Medication 1000 MILLIGRAM(S): at 05:20

## 2025-07-09 RX ADMIN — DEXAMETHASONE 4 MILLIGRAM(S): 0.5 TABLET ORAL at 00:10

## 2025-07-09 RX ADMIN — DEXAMETHASONE 4 MILLIGRAM(S): 0.5 TABLET ORAL at 11:34

## 2025-07-09 RX ADMIN — INSULIN LISPRO 2: 100 INJECTION, SOLUTION INTRAVENOUS; SUBCUTANEOUS at 06:55

## 2025-07-09 RX ADMIN — OXYCODONE HYDROCHLORIDE 10 MILLIGRAM(S): 30 TABLET ORAL at 07:06

## 2025-07-09 RX ADMIN — Medication 4 MILLIGRAM(S): at 11:34

## 2025-07-09 RX ADMIN — Medication 4 MILLIGRAM(S): at 05:19

## 2025-07-09 RX ADMIN — OXYCODONE HYDROCHLORIDE 10 MILLIGRAM(S): 30 TABLET ORAL at 02:29

## 2025-07-09 RX ADMIN — Medication 1000 MILLIGRAM(S): at 13:29

## 2025-07-09 RX ADMIN — Medication 4 MILLIGRAM(S): at 00:10

## 2025-07-09 RX ADMIN — OXYCODONE HYDROCHLORIDE 10 MILLIGRAM(S): 30 TABLET ORAL at 01:29

## 2025-07-09 RX ADMIN — DEXAMETHASONE 4 MILLIGRAM(S): 0.5 TABLET ORAL at 05:19

## 2025-07-09 RX ADMIN — LEVETIRACETAM 500 MILLIGRAM(S): 10 INJECTION, SOLUTION INTRAVENOUS at 05:19

## 2025-07-09 RX ADMIN — OXYCODONE HYDROCHLORIDE 10 MILLIGRAM(S): 30 TABLET ORAL at 08:06

## 2025-07-09 NOTE — PROGRESS NOTE ADULT - SUBJECTIVE AND OBJECTIVE BOX
HPI:  Taken from outpatient neurosurgery note "44 y/o right- handed female, former smoker, with no significant PMHx, found to have left frontal motor strip cystic lesion on MRI 2023 during workup for progressive right fingertip numbness for which went to [facility] and MRI showed small area of signal abnormality in L posterior frontal subcortical and deep white matter, and 3.7x1.8 cm arachnoid cyst. Numbness and weakness continued to progress to entire Right arm and Right leg occurring several times daily and an aura sensation in her Left ear. MRI on 23 showed progression leading to referral to [facility] Neurosurgery and neurology. Started on keppra BID by Dr. Doty to r/o seizure, no improvement of symptoms. MS workup negative. Serial imaging completed which showed increase in size of lesion. 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 Also with intermittent headaches that are improving.Right sided numbness sensations and weakness improved immediate post op and since returned to how it was pre- op.Incision CDI.24 patient returned for f/u and 2 month repeat MRI review which was done  and stable.She continues to report headaches and speech issues but language improving.Recommended to repeat MRI 3 months.7/3/24 pt returned for 3 month follow- up and review of MRI done 24 which was stable.Speech stable. Recommended 6 month follow- up.25 pt returned for 6 month follow- up and review of 25 MRI which was stable. Recommended 6 month follow- up, continue neurology follow- up for seizure control/ AED management. 25 MRI- Ovoid region of intrinsic T1 hyperintensity and surrounding mild enhancement along the left frontoparietal opercular resection cavity appears grossly similar in size to prior study. Mild FLAIR hyperintensity surrounding the cavity is similar in extent.There is a tiny new focus of T1 hyperintense blood products medial to the cavity, in the periventricular region, with slightly more prominent FLAIR hyperintensity.Returns TODAY for follow- up and MRI review. She is currently on keppra 500 mg BID. Neurologist: Sobia Doty--> Scarlet Alvarado NP  "         Subjective:  No acute complaints o/n.    Hospital course:  : POD0 L awake crani tumor resection, frozen: glial cell origin. epilepsy consulted. 1L NS bolus given for soft pressures.   : POD1. regional. MRI complete.   : POD2, HAI overnight    Vital Signs Last 24 Hrs  T(C): 36.6 (2025 21:12), Max: 36.6 (2025 21:12)  T(F): 97.9 (2025 21:12), Max: 97.9 (2025 21:12)  HR: 66 (2025 21:12) (47 - 73)  BP: 114/75 (2025 21:12) (91/50 - 115/65)  BP(mean): 82 (2025 17:00) (65 - 82)  RR: 18 (2025 21:12) (15 - 18)  SpO2: 96% (2025 21:12) (93% - 96%)    Parameters below as of 2025 21:12  Patient On (Oxygen Delivery Method): room air        I&O's Summary    2025 07:01  -  2025 07:00  --------------------------------------------------------  IN: 3045 mL / OUT: 2500 mL / NET: 545 mL        PHYSICAL EXAM:    General: patient seen laying supine in bed in NAD  Neuro: AAOx3, follows commands, opens eyes spontaneously, +mild expressive aphasia,  +R facial droop, +R tongue deviation,  +RUE pronator drift, strength 5/5 LUE, LLE and RLE, RUE 4/5 strength, +decreased sensation to R face V1/V2/V3, RUE and RLE  HEENT: PERRL, EOMI  Neck: supple  Cardiac: RRR, S1S2  Pulmonary: chest rise symmetric  Abdomen: soft, nontender, nondistended  Ext: perfusing well  Skin: warm, dry  Wound: L cranial incision c/d/i    T(C): 36.6 (25 @ 21:12), Max: 36.6 (25 @ 21:12)  HR: 66 (25 @ 21:12) (47 - 73)  BP: 114/75 (25 @ 21:12) (91/50 - 115/65)  RR: 18 (25 @ 21:12) (15 - 18)  SpO2: 96% (25 @ 21:12) (93% - 96%)    LABS:                        11.2   15.29 )-----------( 301      ( 2025 05:30 )             33.2     -    138  |  107  |  11  ----------------------------<  223[H]  4.3   |  20[L]  |  0.60    Ca    8.8      2025 05:30  Phos  2.4     07-08  Mg     2.1     07-08      PT/INR - ( 2025 12:54 )   PT: 10.4 sec;   INR: 0.90          PTT - ( 2025 12:54 )  PTT:26.0 sec  Urinalysis Basic - ( 2025 05:30 )    Color: x / Appearance: x / SG: x / pH: x  Gluc: 223 mg/dL / Ketone: x  / Bili: x / Urobili: x   Blood: x / Protein: x / Nitrite: x   Leuk Esterase: x / RBC: x / WBC x   Sq Epi: x / Non Sq Epi: x / Bacteria: x          CAPILLARY BLOOD GLUCOSE      POCT Blood Glucose.: 215 mg/dL (2025 22:28)  POCT Blood Glucose.: 241 mg/dL (2025 17:11)  POCT Blood Glucose.: 197 mg/dL (2025 16:11)  POCT Blood Glucose.: 186 mg/dL (2025 13:36)  POCT Blood Glucose.: 164 mg/dL (2025 11:33)  POCT Blood Glucose.: 216 mg/dL (2025 05:43)      Drug Levels: [] N/A    CSF Analysis: [] N/A      Allergies    Tylox (Other)    Intolerances      MEDICATIONS:  Antibiotics:    Neuro:  acetaminophen     Tablet .. 1000 milliGRAM(s) Oral every 8 hours  ketorolac   Injectable 15 milliGRAM(s) IV Push every 6 hours PRN  levETIRAcetam 500 milliGRAM(s) Oral two times a day  ondansetron Injectable 4 milliGRAM(s) IV Push every 6 hours  oxyCODONE    IR 10 milliGRAM(s) Oral every 4 hours PRN  oxyCODONE    IR 5 milliGRAM(s) Oral every 4 hours PRN    Anticoagulation:  enoxaparin Injectable 40 milliGRAM(s) SubCutaneous every 24 hours    OTHER:  dexAMETHasone  Injectable   IV Push   dexAMETHasone  Injectable 4 milliGRAM(s) IV Push every 6 hours  dexAMETHasone  Injectable 4 milliGRAM(s) IV Push three times a day  insulin lispro (ADMELOG) corrective regimen sliding scale   SubCutaneous Before meals and at bedtime  pantoprazole    Tablet 40 milliGRAM(s) Oral before breakfast  polyethylene glycol 3350 17 Gram(s) Oral daily  senna 2 Tablet(s) Oral at bedtime    IVF:    CULTURES:    RADIOLOGY & ADDITIONAL TESTS:    C71.9    Anesthesia of skin-R20.0    HEADACHE, UNSPECIFIED    Other symptoms and signs involving the musculoskeletal system-R29.898    Radiculopathy, cervical region-M54.12    No pertinent family history in first degree relatives    FH: aneurysm (Mother)    Family history of CVA (Mother)    Handoff    MEWS Score    Malignant neoplasm of brain    History of seizure    Brain lesion    Brain tumor    Brain tumor    Craniotomy, with neoplasm excision while awake    Brain lesion    Craniotomy, with neoplasm excision while awake    H/O  section    H/O craniotomy    Room Service Assist    Seizure    Smoking history    SysAdmin_VstLnk        ASSESSMENT:  46 yo F PMH left frontal cystic lesion discovered in 2023 during workup for progressive right fingertip numbness. She underwent a left awake craniotomy on 24, path: glial-neuronal tumor. She now reports possible worsening seizure activity vs anxiety. MRI +grossly stable surgical cavity with new focus of T1 hyperintense blood products in the periventricular region and slightly more prominent FLAIR hyperintensity and slight enlargement of enhancement of tumor. Now s/p L awake craniotomy for resection, frozen: glial cell origin (25)    Neuro:  - neuro.vitals q8h  - pain control: cranial ERAS  - seziure tx: continue home keppra 500mg bid   - epilepsy consulted, rec EEG when cleared by nsgy, deferring to outpt  - dex taper 1 week to off  - post op MRI completed     Cardiac:  - SBP goal:     Pulm:  - RA    GI:  - regular diet  - bowel regimen  - gi ppx protonix while on steroids     Renal:  - IVL  - voiding    Endo:  - ISS, A1c 5.2    Heme:  - dvt ppx: SCDs/SQL    ID:  - afebrile    DISPO: regional, full code, rec outpatient PT/OT    Discussed w Dr. D'Amico    Assessment:  Present when checked    []  GCS  E   V  M     Heart Failure: []Acute, [] acute on chronic , []chronic  Heart Failure:  [] Diastolic (HFpEF), [] Systolic (HFrEF), []Combined (HFpEF and HFrEF), [] RHF, [] Pulm HTN, [] Other    [] TEMO, [] ATN, [] AIN, [] other  [] CKD1, [] CKD2, [] CKD 3, [] CKD 4, [] CKD 5, []ESRD    Encephalopathy: [] Metabolic, [] Hepatic, [] toxic, [] Neurological, [] Other    Abnormal Nurtitional Status: [] malnurtition (see nutrition note), [ ]underweight: BMI < 19, [] morbid obesity: BMI >40, [] Cachexia    [] Sepsis  [] hypovolemic shock,[] cardiogenic shock, [] hemorrhagic shock, [] neuogenic shock  [] Acute Respiratory Failure  []Cerebral edema, [] Brain compression/ herniation,   [] Functional quadriplegia  [] Acute blood loss anemia

## 2025-07-09 NOTE — PROGRESS NOTE ADULT - REASON FOR ADMISSION
brain lesion resection

## 2025-07-09 NOTE — DISCHARGE NOTE NURSING/CASE MANAGEMENT/SOCIAL WORK - NSDCFUADDAPPT_GEN_ALL_CORE_FT
Phone call with CINTHIA Agustin on 7/15 at 11AM (she will call you)     Please follow up with Dr. D'Amico in the office on 7/23 at 9AM, call the office to confirm/make changes to appointment at [phone]    Please follow up with Dr. Bower from Epilepsy for outpatient EEG     Please follow up with your primary care doctor

## 2025-07-09 NOTE — DISCHARGE NOTE NURSING/CASE MANAGEMENT/SOCIAL WORK - PATIENT PORTAL LINK FT
You can access the FollowMyHealth Patient Portal offered by [facility] by registering at the following website: [email or web address]. By joining [facility]’s FollowMyHealth portal, you will also be able to view your health information using other applications (apps) compatible with our system.

## 2025-07-09 NOTE — DISCHARGE NOTE NURSING/CASE MANAGEMENT/SOCIAL WORK - FINANCIAL ASSISTANCE
[facility] provides services at a reduced cost to those who are determined to be eligible through [facility]’s financial assistance program. Information regarding [facility]’s financial assistance program can be found by going to [email or web address] or by calling [phone].

## 2025-07-14 ENCOUNTER — NON-APPOINTMENT (OUTPATIENT)
Age: 45
End: 2025-07-14

## 2025-07-14 NOTE — CHART NOTE - NSCHARTNOTEFT_GEN_A_CORE
Patient seen and examined at bedside. No reported seizure activity post-operatively.  Please ensure follow up outpatient. Appointments are listed below:    - Scarlet Alvarado (neurology) on 8/13 3:20pm  - Dr. Bower on 9/29 1pm
Post-Discharge Medication Review: Completed	  Patient contacted to offer medication counseling post-discharge. Medication reconciliation completed. Per patient, medications include:	  	  1.	acetaminophen 500 mg oral tablet 2 tab(s) orally every 8 hours as needed for mild to moderate pain  2.	dexAMETHasone 2 mg oral tablet 2 tab(s) orally every 6 hours TAPER:4mg (2 tabs) every 6 hours (7/8 and 7/9) 4mg (2 tabs) every 8 hours (7/10) 4mg (2 tabs) every 12 hours (7/11) 2mg (1 tab) every every 12 hours (7/12 and 7/13) 2mg (1 tab) daily (7/14)  3.	levETIRAcetam 500 mg oral tablet 1 tab(s) orally 2 times a day  4.	Narcan 4 mg/0.1 mL nasal spray 1 spray(s) intranasally once a day as needed for opioid overdose  5.	oxyCODONE 5 mg oral tablet 1 tab(s) orally every 6 hours as needed for severe pain MDD: 4 tabs  6.	pantoprazole 40 mg oral delayed release tablet 1 tab(s) orally once a day (before a meal)  7.	polyethylene glycol 3350 oral powder for reconstitution 17 gram(s) orally once a day as needed for constipation  	  Medication name, indication, administration, side effect, and monitoring reviewed for new medications during post discharge counseling visit with patient. Patient demonstrated understanding. Counseling offered for all medications.	  	  Dell Bragg, PharmD	  Clinical Pharmacy Specialist, Pharmacy Telehealth Team	  Can be reached via [employer] Teams or [phone]

## 2025-07-15 ENCOUNTER — APPOINTMENT (OUTPATIENT)
Dept: NEUROSURGERY | Facility: CLINIC | Age: 45
End: 2025-07-15
Payer: MEDICAID

## 2025-07-15 PROCEDURE — 99202 OFFICE O/P NEW SF 15 MIN: CPT | Mod: 93

## 2025-07-21 PROBLEM — Z51.89 VISIT FOR WOUND CHECK: Status: ACTIVE | Noted: 2025-07-21

## 2025-07-22 DIAGNOSIS — G40.909 EPILEPSY, UNSPECIFIED, NOT INTRACTABLE, WITHOUT STATUS EPILEPTICUS: ICD-10-CM

## 2025-07-22 DIAGNOSIS — R47.01 APHASIA: ICD-10-CM

## 2025-07-22 DIAGNOSIS — D33.2 BENIGN NEOPLASM OF BRAIN, UNSPECIFIED: ICD-10-CM

## 2025-07-22 DIAGNOSIS — I95.9 HYPOTENSION, UNSPECIFIED: ICD-10-CM

## 2025-07-22 DIAGNOSIS — G93.9 DISORDER OF BRAIN, UNSPECIFIED: ICD-10-CM

## 2025-07-22 DIAGNOSIS — E83.39 OTHER DISORDERS OF PHOSPHORUS METABOLISM: ICD-10-CM

## 2025-07-22 DIAGNOSIS — Z87.891 PERSONAL HISTORY OF NICOTINE DEPENDENCE: ICD-10-CM

## 2025-07-22 DIAGNOSIS — R20.2 PARESTHESIA OF SKIN: ICD-10-CM

## 2025-07-22 DIAGNOSIS — G93.6 CEREBRAL EDEMA: ICD-10-CM

## 2025-07-22 DIAGNOSIS — R29.898 OTHER SYMPTOMS AND SIGNS INVOLVING THE MUSCULOSKELETAL SYSTEM: ICD-10-CM

## 2025-07-23 ENCOUNTER — APPOINTMENT (OUTPATIENT)
Dept: NEUROSURGERY | Facility: CLINIC | Age: 45
End: 2025-07-23
Payer: MEDICAID

## 2025-07-23 VITALS
HEART RATE: 80 BPM | SYSTOLIC BLOOD PRESSURE: 110 MMHG | HEIGHT: 61 IN | BODY MASS INDEX: 29.83 KG/M2 | RESPIRATION RATE: 18 BRPM | WEIGHT: 158 LBS | OXYGEN SATURATION: 98 % | DIASTOLIC BLOOD PRESSURE: 73 MMHG | TEMPERATURE: 98 F

## 2025-07-23 DIAGNOSIS — M79.89 OTHER SPECIFIED SOFT TISSUE DISORDERS: ICD-10-CM

## 2025-07-23 DIAGNOSIS — Z98.890 OTHER SPECIFIED POSTPROCEDURAL STATES: ICD-10-CM

## 2025-07-23 DIAGNOSIS — Z09 ENCOUNTER FOR FOLLOW-UP EXAMINATION AFTER COMPLETED TREATMENT FOR CONDITIONS OTHER THAN MALIGNANT NEOPLASM: ICD-10-CM

## 2025-07-23 DIAGNOSIS — Z51.89 ENCOUNTER FOR OTHER SPECIFIED AFTERCARE: ICD-10-CM

## 2025-07-23 PROCEDURE — 99024 POSTOP FOLLOW-UP VISIT: CPT

## 2025-07-28 ENCOUNTER — NON-APPOINTMENT (OUTPATIENT)
Age: 45
End: 2025-07-28

## 2025-07-31 ENCOUNTER — NON-APPOINTMENT (OUTPATIENT)
Age: 45
End: 2025-07-31

## 2025-07-31 PROBLEM — M79.89 SWELLING, LIMB: Status: ACTIVE | Noted: 2025-07-31

## 2025-08-01 ENCOUNTER — APPOINTMENT (OUTPATIENT)
Dept: VASCULAR SURGERY | Facility: CLINIC | Age: 45
End: 2025-08-01
Payer: MEDICAID

## 2025-08-01 PROCEDURE — 93970 EXTREMITY STUDY: CPT

## 2025-08-04 ENCOUNTER — APPOINTMENT (OUTPATIENT)
Dept: NEUROSURGERY | Facility: CLINIC | Age: 45
End: 2025-08-04
Payer: MEDICAID

## 2025-08-04 VITALS
HEART RATE: 101 BPM | SYSTOLIC BLOOD PRESSURE: 120 MMHG | TEMPERATURE: 97.5 F | BODY MASS INDEX: 29.83 KG/M2 | OXYGEN SATURATION: 98 % | WEIGHT: 158 LBS | DIASTOLIC BLOOD PRESSURE: 81 MMHG | RESPIRATION RATE: 18 BRPM | HEIGHT: 61 IN

## 2025-08-04 PROCEDURE — 99024 POSTOP FOLLOW-UP VISIT: CPT

## 2025-08-05 ENCOUNTER — OUTPATIENT (OUTPATIENT)
Dept: OUTPATIENT SERVICES | Facility: HOSPITAL | Age: 45
LOS: 1 days | End: 2025-08-05

## 2025-08-05 ENCOUNTER — APPOINTMENT (OUTPATIENT)
Dept: MRI IMAGING | Facility: HOSPITAL | Age: 45
End: 2025-08-05
Payer: MEDICAID

## 2025-08-05 DIAGNOSIS — Z98.890 OTHER SPECIFIED POSTPROCEDURAL STATES: Chronic | ICD-10-CM

## 2025-08-05 DIAGNOSIS — Z98.891 HISTORY OF UTERINE SCAR FROM PREVIOUS SURGERY: Chronic | ICD-10-CM

## 2025-08-05 PROCEDURE — 70553 MRI BRAIN STEM W/O & W/DYE: CPT | Mod: 26

## 2025-08-06 ENCOUNTER — APPOINTMENT (OUTPATIENT)
Dept: NEUROSURGERY | Facility: CLINIC | Age: 45
End: 2025-08-06
Payer: MEDICAID

## 2025-08-06 ENCOUNTER — NON-APPOINTMENT (OUTPATIENT)
Age: 45
End: 2025-08-06

## 2025-08-06 ENCOUNTER — APPOINTMENT (OUTPATIENT)
Dept: RADIATION ONCOLOGY | Facility: CLINIC | Age: 45
End: 2025-08-06

## 2025-08-06 VITALS
HEART RATE: 64 BPM | DIASTOLIC BLOOD PRESSURE: 60 MMHG | BODY MASS INDEX: 30.96 KG/M2 | WEIGHT: 164 LBS | OXYGEN SATURATION: 98 % | RESPIRATION RATE: 18 BRPM | HEIGHT: 61 IN | TEMPERATURE: 97.4 F | SYSTOLIC BLOOD PRESSURE: 98 MMHG

## 2025-08-06 VITALS
SYSTOLIC BLOOD PRESSURE: 107 MMHG | TEMPERATURE: 98 F | HEIGHT: 61 IN | RESPIRATION RATE: 16 BRPM | HEART RATE: 89 BPM | DIASTOLIC BLOOD PRESSURE: 73 MMHG | WEIGHT: 164.06 LBS | BODY MASS INDEX: 30.98 KG/M2 | OXYGEN SATURATION: 97 %

## 2025-08-06 DIAGNOSIS — Z87.891 PERSONAL HISTORY OF NICOTINE DEPENDENCE: ICD-10-CM

## 2025-08-06 DIAGNOSIS — R56.9 UNSPECIFIED CONVULSIONS: ICD-10-CM

## 2025-08-06 DIAGNOSIS — D49.6 NEOPLASM OF UNSPECIFIED BEHAVIOR OF BRAIN: ICD-10-CM

## 2025-08-06 PROCEDURE — 99204 OFFICE O/P NEW MOD 45 MIN: CPT

## 2025-08-06 PROCEDURE — 99024 POSTOP FOLLOW-UP VISIT: CPT

## 2025-08-06 RX ORDER — IBUPROFEN 600 MG/1
600 TABLET, FILM COATED ORAL
Refills: 0 | Status: ACTIVE | COMMUNITY

## 2025-08-07 ENCOUNTER — NON-APPOINTMENT (OUTPATIENT)
Age: 45
End: 2025-08-07

## 2025-08-07 LAB
ALBUMIN SERPL ELPH-MCNC: 4.1 G/DL
ALP BLD-CCNC: 74 U/L
ALT SERPL-CCNC: 17 U/L
ANION GAP SERPL CALC-SCNC: 11 MMOL/L
APTT BLD: 28.7 SEC
AST SERPL-CCNC: 21 U/L
BASOPHILS # BLD AUTO: 0.03 K/UL
BASOPHILS NFR BLD AUTO: 0.6 %
BILIRUB SERPL-MCNC: 0.2 MG/DL
BUN SERPL-MCNC: 12 MG/DL
CALCIUM SERPL-MCNC: 10.3 MG/DL
CHLORIDE SERPL-SCNC: 107 MMOL/L
CO2 SERPL-SCNC: 22 MMOL/L
CREAT SERPL-MCNC: 0.83 MG/DL
EGFRCR SERPLBLD CKD-EPI 2021: 87 ML/MIN/1.73M2
EOSINOPHIL # BLD AUTO: 0.27 K/UL
EOSINOPHIL NFR BLD AUTO: 5.3 %
GLUCOSE SERPL-MCNC: 100 MG/DL
HCT VFR BLD CALC: 38.4 %
HCV RNA FLD QL NAA+PROBE: NORMAL
HCV RNA SPEC QL PROBE+SIG AMP: NOT DETECTED
HGB BLD-MCNC: 12.1 G/DL
IMM GRANULOCYTES NFR BLD AUTO: 0.4 %
INR PPP: 0.86 RATIO
LYMPHOCYTES # BLD AUTO: 1.66 K/UL
LYMPHOCYTES NFR BLD AUTO: 32.4 %
MAN DIFF?: NORMAL
MCHC RBC-ENTMCNC: 30.3 PG
MCHC RBC-ENTMCNC: 31.5 G/DL
MCV RBC AUTO: 96.2 FL
MONOCYTES # BLD AUTO: 0.6 K/UL
MONOCYTES NFR BLD AUTO: 11.7 %
NEUTROPHILS # BLD AUTO: 2.54 K/UL
NEUTROPHILS NFR BLD AUTO: 49.6 %
PLATELET # BLD AUTO: 393 K/UL
POTASSIUM SERPL-SCNC: 4.2 MMOL/L
PROT SERPL-MCNC: 7.3 G/DL
PT BLD: 10.2 SEC
RBC # BLD: 3.99 M/UL
RBC # FLD: 12.7 %
SODIUM SERPL-SCNC: 140 MMOL/L
WBC # FLD AUTO: 5.12 K/UL

## 2025-08-08 ENCOUNTER — NON-APPOINTMENT (OUTPATIENT)
Age: 45
End: 2025-08-08

## 2025-08-08 ENCOUNTER — APPOINTMENT (OUTPATIENT)
Dept: NEUROLOGY | Facility: CLINIC | Age: 45
End: 2025-08-08
Payer: MEDICAID

## 2025-08-08 PROCEDURE — 99205 OFFICE O/P NEW HI 60 MIN: CPT | Mod: 95

## 2025-08-08 RX ORDER — ONDANSETRON 8 MG/1
8 TABLET ORAL
Qty: 30 | Refills: 2 | Status: ACTIVE | COMMUNITY
Start: 2025-08-08 | End: 1900-01-01

## 2025-08-08 RX ORDER — TEMOZOLOMIDE 20 MG/1
20 CAPSULE ORAL
Qty: 42 | Refills: 0 | Status: ACTIVE | COMMUNITY
Start: 2025-08-08 | End: 1900-01-01

## 2025-08-08 RX ORDER — TEMOZOLOMIDE 5 MG/1
5 CAPSULE ORAL
Qty: 84 | Refills: 0 | Status: ACTIVE | COMMUNITY
Start: 2025-08-08 | End: 1900-01-01

## 2025-08-08 RX ORDER — TEMOZOLOMIDE 100 MG/1
100 CAPSULE ORAL
Qty: 42 | Refills: 0 | Status: ACTIVE | COMMUNITY
Start: 2025-08-08 | End: 1900-01-01

## 2025-08-11 LAB — HBV SURFACE AB SER QL: REACTIVE

## 2025-08-12 ENCOUNTER — NON-APPOINTMENT (OUTPATIENT)
Age: 45
End: 2025-08-12

## 2025-08-12 RX ORDER — LEVETIRACETAM 750 MG/1
750 TABLET, FILM COATED ORAL TWICE DAILY
Qty: 60 | Refills: 3 | Status: ACTIVE | COMMUNITY
Start: 2025-08-08 | End: 1900-01-01

## 2025-08-13 ENCOUNTER — APPOINTMENT (OUTPATIENT)
Dept: NEUROLOGY | Facility: CLINIC | Age: 45
End: 2025-08-13

## 2025-08-13 ENCOUNTER — NON-APPOINTMENT (OUTPATIENT)
Age: 45
End: 2025-08-13

## 2025-08-22 ENCOUNTER — NON-APPOINTMENT (OUTPATIENT)
Age: 45
End: 2025-08-22

## 2025-08-26 ENCOUNTER — NON-APPOINTMENT (OUTPATIENT)
Age: 45
End: 2025-08-26

## 2025-09-05 ENCOUNTER — NON-APPOINTMENT (OUTPATIENT)
Age: 45
End: 2025-09-05

## 2025-09-08 ENCOUNTER — APPOINTMENT (OUTPATIENT)
Dept: NEUROLOGY | Facility: CLINIC | Age: 45
End: 2025-09-08
Payer: MEDICAID

## 2025-09-08 ENCOUNTER — APPOINTMENT (OUTPATIENT)
Dept: NEUROLOGY | Facility: CLINIC | Age: 45
End: 2025-09-08

## 2025-09-08 ENCOUNTER — NON-APPOINTMENT (OUTPATIENT)
Age: 45
End: 2025-09-08

## 2025-09-08 VITALS
WEIGHT: 164 LBS | SYSTOLIC BLOOD PRESSURE: 95 MMHG | HEART RATE: 64 BPM | DIASTOLIC BLOOD PRESSURE: 68 MMHG | BODY MASS INDEX: 30.96 KG/M2 | OXYGEN SATURATION: 100 % | HEIGHT: 61 IN

## 2025-09-08 PROCEDURE — 99215 OFFICE O/P EST HI 40 MIN: CPT

## 2025-09-08 RX ORDER — LEVETIRACETAM 1000 MG/1
1000 TABLET, FILM COATED ORAL
Qty: 60 | Refills: 3 | Status: ACTIVE | COMMUNITY
Start: 2025-09-08 | End: 1900-01-01

## 2025-09-11 ENCOUNTER — NON-APPOINTMENT (OUTPATIENT)
Age: 45
End: 2025-09-11

## 2025-09-11 VITALS
TEMPERATURE: 97.6 F | BODY MASS INDEX: 30.21 KG/M2 | DIASTOLIC BLOOD PRESSURE: 74 MMHG | OXYGEN SATURATION: 98 % | SYSTOLIC BLOOD PRESSURE: 109 MMHG | RESPIRATION RATE: 18 BRPM | WEIGHT: 160 LBS | HEIGHT: 61 IN | HEART RATE: 65 BPM

## 2025-09-11 RX ORDER — OMEPRAZOLE 20 MG/1
20 CAPSULE, DELAYED RELEASE ORAL DAILY
Qty: 30 | Refills: 3 | Status: ACTIVE | COMMUNITY
Start: 2025-09-11 | End: 1900-01-01

## 2025-09-11 RX ORDER — DEXAMETHASONE 2 MG/1
2 TABLET ORAL TWICE DAILY
Qty: 60 | Refills: 0 | Status: ACTIVE | COMMUNITY
Start: 2025-09-11 | End: 1900-01-01

## 2025-09-16 ENCOUNTER — NON-APPOINTMENT (OUTPATIENT)
Age: 45
End: 2025-09-16

## 2025-09-16 VITALS
WEIGHT: 164 LBS | OXYGEN SATURATION: 98 % | HEART RATE: 76 BPM | RESPIRATION RATE: 18 BRPM | HEIGHT: 61 IN | BODY MASS INDEX: 30.96 KG/M2 | DIASTOLIC BLOOD PRESSURE: 78 MMHG | SYSTOLIC BLOOD PRESSURE: 114 MMHG | TEMPERATURE: 98 F

## 2025-09-17 ENCOUNTER — NON-APPOINTMENT (OUTPATIENT)
Age: 45
End: 2025-09-17

## (undated) DEVICE — BIPOLAR FORCEP SYMMETRY BAYONET STR 8.25" X 1.5MM (BLUE)

## (undated) DEVICE — DRSG TELFA .5 X 3

## (undated) DEVICE — SPONGE SURGICAL STRIP 1/2 X 6"

## (undated) DEVICE — TUBING SUCTION 20FT

## (undated) DEVICE — ARACHNOID CIRCULAR LONG HANDLE 8"

## (undated) DEVICE — RUBBERBAND STRL LTX FR 200/CA 3X1/8IN

## (undated) DEVICE — DRSG TEGADERM 4 X 4.75"

## (undated) DEVICE — BIPOLAR ISOCOOL TIP 2MM

## (undated) DEVICE — DRAPE INSTRUMENT POUCH 6.75" X 11"

## (undated) DEVICE — SUT NUROLON 4-0 8-18" TF (POP-OFF)

## (undated) DEVICE — STRYKER SONOPET TIP UNIVERSAL STRAIGHT

## (undated) DEVICE — MIDAS REX MR8 MATCH HEAD FLUTED LG BORE 3MM X 14CM

## (undated) DEVICE — PREP BETADINE KIT

## (undated) DEVICE — AESCULAP SCALPFIX 10 CLIPS

## (undated) DEVICE — NDL BIOPSY NAVIGATED

## (undated) DEVICE — CLIPPER BLADE GENERAL USE

## (undated) DEVICE — MARKING PEN W RULER

## (undated) DEVICE — SUT VICRYL PLUS 2-0 18" CT-1 (POP-OFF)

## (undated) DEVICE — SUT STRATAFIX SPIRAL MONOCRYL PLUS 3-0 30CM PS-1 UNDYED

## (undated) DEVICE — DRAPE MAGNETIC INSTRUMENT MEDIUM

## (undated) DEVICE — ARACHNOID CIRCULAR SUPERFICIAL HANDLE 5"

## (undated) DEVICE — ELCTR STRYKER NEPTUNE SMOKE EVACUATION PENCIL (GREEN)

## (undated) DEVICE — DRSG TEGADERM 4X4.75"

## (undated) DEVICE — STRYKER NAVIGATION SPHERES PASSIVE

## (undated) DEVICE — STRYKER INSTRUMENT BATTERY

## (undated) DEVICE — POSITIONER FOAM EGG CRATE ULNAR 2PCS (PINK)

## (undated) DEVICE — MIDAS REX MR8 TAPERED SM BORE 1.MM X 7CM

## (undated) DEVICE — PACK CRANIOTOMY LNX SURGICOUNT

## (undated) DEVICE — SPONGE SURGICAL STRIP 1/4 X 6"

## (undated) DEVICE — Device

## (undated) DEVICE — SUT VICRYL 2-0 27" CT-1

## (undated) DEVICE — NEPTUNE 4-PORT MANIFOLD STANDARD

## (undated) DEVICE — LONE STAR RETRACTOR RING 12MM BLUNT DISP

## (undated) DEVICE — NEURO SURGICAL STRIP 1" X 6"

## (undated) DEVICE — PREP BETADINE SPONGE STICKS

## (undated) DEVICE — STAPLER SKIN PROXIMATE

## (undated) DEVICE — CODMAN PERFORATOR 14MM (BLUE)

## (undated) DEVICE — PERFORATOR SOPHYSA ALPHA 11-14-S

## (undated) DEVICE — SPONGE SURGICAL STRIP 1" X 6"

## (undated) DEVICE — TUBING STRYKER SET AND EXTENDER FILTER TUBING

## (undated) DEVICE — STRYKER NAVIGATION INSTRUMENT BATTERY

## (undated) DEVICE — MIDAS REX LEGEND TAPERED SM BORE 2.3MM X 8CM

## (undated) DEVICE — DRSG GAUZE PETRO STRIP IN PEELABLE PK 1 X 36"

## (undated) DEVICE — SPHERE NAVIGATION PASSIVE 100/PK

## (undated) DEVICE — NEURO SURGICAL STRIP 1/4 X 6"

## (undated) DEVICE — NEURO SURGICAL STRIP 1/2 X 6"